# Patient Record
Sex: FEMALE | Race: BLACK OR AFRICAN AMERICAN | Employment: OTHER | URBAN - METROPOLITAN AREA
[De-identification: names, ages, dates, MRNs, and addresses within clinical notes are randomized per-mention and may not be internally consistent; named-entity substitution may affect disease eponyms.]

---

## 2017-02-13 ENCOUNTER — OFFICE VISIT (OUTPATIENT)
Dept: ORTHOPEDIC SURGERY | Age: 67
End: 2017-02-13

## 2017-02-13 VITALS
WEIGHT: 221 LBS | RESPIRATION RATE: 16 BRPM | HEART RATE: 61 BPM | DIASTOLIC BLOOD PRESSURE: 68 MMHG | BODY MASS INDEX: 31.64 KG/M2 | SYSTOLIC BLOOD PRESSURE: 122 MMHG | HEIGHT: 70 IN

## 2017-02-13 DIAGNOSIS — M77.11 LATERAL EPICONDYLITIS OF RIGHT ELBOW: Primary | ICD-10-CM

## 2017-02-13 DIAGNOSIS — M25.521 ELBOW PAIN, RIGHT: ICD-10-CM

## 2017-02-13 PROCEDURE — 99213 OFFICE O/P EST LOW 20 MIN: CPT | Performed by: NURSE PRACTITIONER

## 2017-02-13 PROCEDURE — 73080 X-RAY EXAM OF ELBOW: CPT | Performed by: NURSE PRACTITIONER

## 2017-02-13 PROCEDURE — MISCD86 TENNIS ELBOW STRAP-BREG: Performed by: NURSE PRACTITIONER

## 2017-02-13 RX ORDER — MECLIZINE HYDROCHLORIDE 25 MG/1
25 TABLET ORAL DAILY
COMMUNITY
Start: 2017-02-10 | End: 2018-07-27 | Stop reason: ALTCHOICE

## 2017-02-23 ENCOUNTER — TELEPHONE (OUTPATIENT)
Dept: ORTHOPEDIC SURGERY | Age: 67
End: 2017-02-23

## 2017-02-27 RX ORDER — MELOXICAM 7.5 MG/1
7.5 TABLET ORAL DAILY
Qty: 30 TABLET | Refills: 1 | Status: ON HOLD | OUTPATIENT
Start: 2017-02-27 | End: 2017-07-18 | Stop reason: HOSPADM

## 2017-05-11 ENCOUNTER — TELEPHONE (OUTPATIENT)
Dept: ORTHOPEDIC SURGERY | Age: 67
End: 2017-05-11

## 2017-05-18 ENCOUNTER — OFFICE VISIT (OUTPATIENT)
Dept: ORTHOPEDIC SURGERY | Age: 67
End: 2017-05-18

## 2017-05-18 VITALS — TEMPERATURE: 97 F | WEIGHT: 221 LBS | HEIGHT: 70 IN | RESPIRATION RATE: 16 BRPM | BODY MASS INDEX: 31.64 KG/M2

## 2017-05-18 DIAGNOSIS — M16.11 PRIMARY OSTEOARTHRITIS OF RIGHT HIP: ICD-10-CM

## 2017-05-18 DIAGNOSIS — M25.551 PAIN OF RIGHT HIP JOINT: Primary | ICD-10-CM

## 2017-05-18 PROCEDURE — 99214 OFFICE O/P EST MOD 30 MIN: CPT | Performed by: ORTHOPAEDIC SURGERY

## 2017-05-30 ENCOUNTER — TELEPHONE (OUTPATIENT)
Dept: ORTHOPEDIC SURGERY | Age: 67
End: 2017-05-30

## 2017-06-07 ENCOUNTER — TELEPHONE (OUTPATIENT)
Dept: ORTHOPEDIC SURGERY | Age: 67
End: 2017-06-07

## 2017-06-16 ENCOUNTER — TELEPHONE (OUTPATIENT)
Dept: ORTHOPEDIC SURGERY | Age: 67
End: 2017-06-16

## 2017-07-06 ENCOUNTER — TELEPHONE (OUTPATIENT)
Dept: ORTHOPEDIC SURGERY | Age: 67
End: 2017-07-06

## 2017-07-11 ENCOUNTER — HOSPITAL ENCOUNTER (OUTPATIENT)
Dept: PREADMISSION TESTING | Age: 67
Discharge: HOME | End: 2017-07-11
Attending: ORTHOPAEDIC SURGERY | Admitting: ORTHOPAEDIC SURGERY

## 2017-07-11 LAB
ABO/RH: NORMAL
ALBUMIN SERPL-MCNC: 4.3 G/DL (ref 3.4–5)
ANION GAP SERPL CALCULATED.3IONS-SCNC: 16 MMOL/L (ref 3–16)
ANTIBODY SCREEN: NORMAL
APTT: 26.1 SEC (ref 21–31.8)
BACTERIA: ABNORMAL /HPF
BASOPHILS ABSOLUTE: 0 K/UL (ref 0–0.2)
BASOPHILS RELATIVE PERCENT: 0.5 %
BILIRUBIN URINE: NEGATIVE
BLOOD, URINE: NEGATIVE
BUN BLDV-MCNC: 18 MG/DL (ref 7–20)
CALCIUM SERPL-MCNC: 9.6 MG/DL (ref 8.3–10.6)
CHLORIDE BLD-SCNC: 98 MMOL/L (ref 99–110)
CLARITY: CLEAR
CO2: 25 MMOL/L (ref 21–32)
COLOR: YELLOW
COMMENT UA: ABNORMAL
CREAT SERPL-MCNC: 0.9 MG/DL (ref 0.6–1.2)
EOSINOPHILS ABSOLUTE: 0.1 K/UL (ref 0–0.6)
EOSINOPHILS RELATIVE PERCENT: 1.1 %
EPITHELIAL CELLS, UA: ABNORMAL /HPF
ESTIMATED AVERAGE GLUCOSE: 191.5 MG/DL
GFR AFRICAN AMERICAN: >60
GFR NON-AFRICAN AMERICAN: >60
GLUCOSE BLD-MCNC: 64 MG/DL (ref 70–99)
GLUCOSE URINE: NEGATIVE MG/DL
HBA1C MFR BLD: 8.3 %
HCT VFR BLD CALC: 37.8 % (ref 36–48)
HEMOGLOBIN: 12.5 G/DL (ref 12–16)
INR BLD: 0.93 (ref 0.85–1.15)
KETONES, URINE: NEGATIVE MG/DL
LEUKOCYTE ESTERASE, URINE: ABNORMAL
LYMPHOCYTES ABSOLUTE: 2.1 K/UL (ref 1–5.1)
LYMPHOCYTES RELATIVE PERCENT: 27.5 %
MCH RBC QN AUTO: 29.3 PG (ref 26–34)
MCHC RBC AUTO-ENTMCNC: 33 G/DL (ref 31–36)
MCV RBC AUTO: 88.9 FL (ref 80–100)
MICROSCOPIC EXAMINATION: YES
MONOCYTES ABSOLUTE: 0.6 K/UL (ref 0–1.3)
MONOCYTES RELATIVE PERCENT: 7.2 %
NEUTROPHILS ABSOLUTE: 4.9 K/UL (ref 1.7–7.7)
NEUTROPHILS RELATIVE PERCENT: 63.7 %
NITRITE, URINE: NEGATIVE
PDW BLD-RTO: 13.1 % (ref 12.4–15.4)
PH UA: 6
PLATELET # BLD: 257 K/UL (ref 135–450)
PMV BLD AUTO: 7.9 FL (ref 5–10.5)
POTASSIUM SERPL-SCNC: 3.8 MMOL/L (ref 3.5–5.1)
PREALBUMIN: 24.3 MG/DL (ref 20–40)
PROTEIN UA: NEGATIVE MG/DL
PROTHROMBIN TIME: 10.5 SEC (ref 9.6–13)
RBC # BLD: 4.25 M/UL (ref 4–5.2)
RBC UA: ABNORMAL /HPF (ref 0–2)
SEDIMENTATION RATE, ERYTHROCYTE: 44 MM/HR (ref 0–30)
SODIUM BLD-SCNC: 139 MMOL/L (ref 136–145)
SPECIFIC GRAVITY UA: <1.005
URINE REFLEX TO CULTURE: YES
URINE TYPE: ABNORMAL
UROBILINOGEN, URINE: 0.2 E.U./DL
VITAMIN D 25-HYDROXY: 35.9 NG/ML
WBC # BLD: 7.7 K/UL (ref 4–11)
WBC UA: ABNORMAL /HPF (ref 0–5)

## 2017-07-12 ENCOUNTER — TELEPHONE (OUTPATIENT)
Dept: ORTHOPEDIC SURGERY | Age: 67
End: 2017-07-12

## 2017-07-12 LAB
EKG ATRIAL RATE: 64 BPM
EKG DIAGNOSIS: NORMAL
EKG P AXIS: 61 DEGREES
EKG P-R INTERVAL: 168 MS
EKG Q-T INTERVAL: 402 MS
EKG QRS DURATION: 84 MS
EKG QTC CALCULATION (BAZETT): 414 MS
EKG R AXIS: 39 DEGREES
EKG T AXIS: 42 DEGREES
EKG VENTRICULAR RATE: 64 BPM
MRSA SCREEN RT-PCR: NORMAL

## 2017-07-12 PROCEDURE — 93010 ELECTROCARDIOGRAM REPORT: CPT | Performed by: INTERNAL MEDICINE

## 2017-07-13 ENCOUNTER — OFFICE VISIT (OUTPATIENT)
Dept: ORTHOPEDIC SURGERY | Age: 67
End: 2017-07-13

## 2017-07-13 DIAGNOSIS — M16.11 PRIMARY OSTEOARTHRITIS OF RIGHT HIP: Primary | ICD-10-CM

## 2017-07-13 LAB
ORGANISM: ABNORMAL
URINE CULTURE, ROUTINE: ABNORMAL

## 2017-07-13 PROCEDURE — 99214 OFFICE O/P EST MOD 30 MIN: CPT | Performed by: ORTHOPAEDIC SURGERY

## 2017-07-13 RX ORDER — SULFAMETHOXAZOLE AND TRIMETHOPRIM 800; 160 MG/1; MG/1
1 TABLET ORAL 2 TIMES DAILY
Qty: 20 TABLET | Refills: 0 | Status: SHIPPED | OUTPATIENT
Start: 2017-07-13 | End: 2017-07-23

## 2017-07-14 ENCOUNTER — PAT TELEPHONE (OUTPATIENT)
Dept: PREADMISSION TESTING | Age: 67
End: 2017-07-14

## 2017-07-14 VITALS — WEIGHT: 224 LBS | HEIGHT: 70 IN | BODY MASS INDEX: 32.07 KG/M2

## 2017-07-14 RX ORDER — OXYCODONE HYDROCHLORIDE 5 MG/1
10 TABLET ORAL ONCE
Status: CANCELLED | OUTPATIENT
Start: 2017-07-18

## 2017-07-14 RX ORDER — CELECOXIB 200 MG/1
200 CAPSULE ORAL ONCE
Status: CANCELLED | OUTPATIENT
Start: 2017-07-18

## 2017-07-14 RX ORDER — ANASTROZOLE 1 MG/1
1 TABLET ORAL DAILY
COMMUNITY

## 2017-07-14 RX ORDER — SERTRALINE HYDROCHLORIDE 25 MG/1
25 TABLET, FILM COATED ORAL DAILY
COMMUNITY
Start: 2017-05-10 | End: 2018-07-27 | Stop reason: ALTCHOICE

## 2017-07-14 RX ORDER — MULTIVIT WITH MINERALS/LUTEIN
1000 TABLET ORAL DAILY
COMMUNITY

## 2017-07-17 ENCOUNTER — SURG/PROC ORDERS (OUTPATIENT)
Dept: ANESTHESIOLOGY | Age: 67
End: 2017-07-17

## 2017-07-17 ENCOUNTER — HOSPITAL ENCOUNTER (OUTPATIENT)
Dept: NON INVASIVE DIAGNOSTICS | Age: 67
Discharge: HOME | End: 2017-07-17
Attending: INTERNAL MEDICINE | Admitting: INTERNAL MEDICINE

## 2017-07-17 VITALS — WEIGHT: 224 LBS | HEIGHT: 70 IN | BODY MASS INDEX: 32.07 KG/M2

## 2017-07-17 DIAGNOSIS — R94.31 ABNORMAL ELECTROCARDIOGRAM: ICD-10-CM

## 2017-07-17 RX ORDER — ATROPINE SULFATE 0.1 MG/ML
1 INJECTION INTRAVENOUS ONCE
Status: DISCONTINUED | OUTPATIENT
Start: 2017-07-17 | End: 2017-07-17 | Stop reason: ALTCHOICE

## 2017-07-17 RX ORDER — SODIUM CHLORIDE 0.9 % (FLUSH) 0.9 %
10 SYRINGE (ML) INJECTION EVERY 12 HOURS SCHEDULED
Status: CANCELLED | OUTPATIENT
Start: 2017-07-17

## 2017-07-17 RX ORDER — SODIUM CHLORIDE 0.9 % (FLUSH) 0.9 %
10 SYRINGE (ML) INJECTION PRN
Status: CANCELLED | OUTPATIENT
Start: 2017-07-17

## 2017-07-17 RX ORDER — DOBUTAMINE HYDROCHLORIDE 200 MG/100ML
10 INJECTION INTRAVENOUS CONTINUOUS
Status: ACTIVE | OUTPATIENT
Start: 2017-07-17 | End: 2017-07-17

## 2017-07-17 RX ORDER — SODIUM CHLORIDE 9 MG/ML
INJECTION, SOLUTION INTRAVENOUS CONTINUOUS
Status: CANCELLED | OUTPATIENT
Start: 2017-07-17

## 2017-07-17 RX ADMIN — DOBUTAMINE HYDROCHLORIDE 10 MCG/KG/MIN: 200 INJECTION INTRAVENOUS at 14:42

## 2017-07-18 PROBLEM — M16.11 ARTHRITIS OF RIGHT HIP: Status: ACTIVE | Noted: 2017-07-18

## 2017-07-24 ENCOUNTER — TELEPHONE (OUTPATIENT)
Dept: ORTHOPEDIC SURGERY | Age: 67
End: 2017-07-24

## 2017-07-26 ENCOUNTER — TELEPHONE (OUTPATIENT)
Dept: ORTHOPEDIC SURGERY | Age: 67
End: 2017-07-26

## 2017-07-28 ENCOUNTER — TELEPHONE (OUTPATIENT)
Dept: ORTHOPEDIC SURGERY | Age: 67
End: 2017-07-28

## 2017-07-28 DIAGNOSIS — Z96.641 H/O TOTAL HIP ARTHROPLASTY, RIGHT: Primary | ICD-10-CM

## 2017-07-28 PROBLEM — D64.9 ANEMIA: Status: ACTIVE | Noted: 2017-07-28

## 2017-07-28 PROBLEM — I10 HTN (HYPERTENSION): Status: ACTIVE | Noted: 2017-07-28

## 2017-07-31 ENCOUNTER — TELEPHONE (OUTPATIENT)
Dept: ORTHOPEDIC SURGERY | Age: 67
End: 2017-07-31

## 2017-08-03 ENCOUNTER — OFFICE VISIT (OUTPATIENT)
Dept: ORTHOPEDIC SURGERY | Age: 67
End: 2017-08-03

## 2017-08-03 VITALS — HEIGHT: 70 IN | WEIGHT: 227 LBS | BODY MASS INDEX: 32.5 KG/M2 | RESPIRATION RATE: 16 BRPM | TEMPERATURE: 97.2 F

## 2017-08-03 DIAGNOSIS — M16.11 ARTHRITIS OF RIGHT HIP: Primary | ICD-10-CM

## 2017-08-03 PROCEDURE — 99024 POSTOP FOLLOW-UP VISIT: CPT | Performed by: ORTHOPAEDIC SURGERY

## 2017-08-10 ENCOUNTER — TELEPHONE (OUTPATIENT)
Dept: ORTHOPEDIC SURGERY | Age: 67
End: 2017-08-10

## 2017-08-31 ENCOUNTER — OFFICE VISIT (OUTPATIENT)
Dept: ORTHOPEDIC SURGERY | Age: 67
End: 2017-08-31

## 2017-08-31 VITALS — WEIGHT: 227 LBS | BODY MASS INDEX: 32.5 KG/M2 | RESPIRATION RATE: 16 BRPM | TEMPERATURE: 97.3 F | HEIGHT: 70 IN

## 2017-08-31 DIAGNOSIS — Z96.641 H/O TOTAL HIP ARTHROPLASTY, RIGHT: Primary | ICD-10-CM

## 2017-08-31 PROCEDURE — 99024 POSTOP FOLLOW-UP VISIT: CPT | Performed by: ORTHOPAEDIC SURGERY

## 2017-09-11 ENCOUNTER — OFFICE VISIT (OUTPATIENT)
Dept: CARDIOLOGY CLINIC | Age: 67
End: 2017-09-11

## 2017-09-11 VITALS
DIASTOLIC BLOOD PRESSURE: 72 MMHG | SYSTOLIC BLOOD PRESSURE: 112 MMHG | OXYGEN SATURATION: 97 % | HEIGHT: 70 IN | HEART RATE: 77 BPM | BODY MASS INDEX: 32.07 KG/M2 | WEIGHT: 224 LBS

## 2017-09-11 DIAGNOSIS — I25.10 CORONARY ARTERY DISEASE INVOLVING NATIVE CORONARY ARTERY OF NATIVE HEART WITHOUT ANGINA PECTORIS: Primary | Chronic | ICD-10-CM

## 2017-09-11 PROCEDURE — 99214 OFFICE O/P EST MOD 30 MIN: CPT | Performed by: INTERNAL MEDICINE

## 2017-09-11 PROCEDURE — 93000 ELECTROCARDIOGRAM COMPLETE: CPT | Performed by: INTERNAL MEDICINE

## 2017-10-26 ENCOUNTER — OFFICE VISIT (OUTPATIENT)
Dept: ORTHOPEDIC SURGERY | Age: 67
End: 2017-10-26

## 2017-10-26 VITALS — BODY MASS INDEX: 32.07 KG/M2 | TEMPERATURE: 97.2 F | WEIGHT: 224 LBS | HEIGHT: 70 IN

## 2017-10-26 DIAGNOSIS — Z96.641 H/O TOTAL HIP ARTHROPLASTY, RIGHT: Primary | ICD-10-CM

## 2017-10-26 PROCEDURE — 1036F TOBACCO NON-USER: CPT | Performed by: PHYSICIAN ASSISTANT

## 2017-10-26 PROCEDURE — G8417 CALC BMI ABV UP PARAM F/U: HCPCS | Performed by: PHYSICIAN ASSISTANT

## 2017-10-26 PROCEDURE — G8484 FLU IMMUNIZE NO ADMIN: HCPCS | Performed by: PHYSICIAN ASSISTANT

## 2017-10-26 PROCEDURE — G8598 ASA/ANTIPLAT THER USED: HCPCS | Performed by: PHYSICIAN ASSISTANT

## 2017-10-26 PROCEDURE — 1090F PRES/ABSN URINE INCON ASSESS: CPT | Performed by: PHYSICIAN ASSISTANT

## 2017-10-26 PROCEDURE — 3017F COLORECTAL CA SCREEN DOC REV: CPT | Performed by: PHYSICIAN ASSISTANT

## 2017-10-26 PROCEDURE — 1123F ACP DISCUSS/DSCN MKR DOCD: CPT | Performed by: PHYSICIAN ASSISTANT

## 2017-10-26 PROCEDURE — 3014F SCREEN MAMMO DOC REV: CPT | Performed by: PHYSICIAN ASSISTANT

## 2017-10-26 PROCEDURE — G8400 PT W/DXA NO RESULTS DOC: HCPCS | Performed by: PHYSICIAN ASSISTANT

## 2017-10-26 PROCEDURE — 4040F PNEUMOC VAC/ADMIN/RCVD: CPT | Performed by: PHYSICIAN ASSISTANT

## 2017-10-26 PROCEDURE — 99212 OFFICE O/P EST SF 10 MIN: CPT | Performed by: PHYSICIAN ASSISTANT

## 2017-10-26 PROCEDURE — G8427 DOCREV CUR MEDS BY ELIG CLIN: HCPCS | Performed by: PHYSICIAN ASSISTANT

## 2017-10-27 PROBLEM — Z96.641 H/O TOTAL HIP ARTHROPLASTY, RIGHT: Status: ACTIVE | Noted: 2017-10-27

## 2017-11-08 ENCOUNTER — TELEPHONE (OUTPATIENT)
Dept: ORTHOPEDIC SURGERY | Age: 67
End: 2017-11-08

## 2018-01-26 ENCOUNTER — TELEPHONE (OUTPATIENT)
Dept: ORTHOPEDIC SURGERY | Age: 68
End: 2018-01-26

## 2018-02-05 ENCOUNTER — OFFICE VISIT (OUTPATIENT)
Dept: ORTHOPEDIC SURGERY | Age: 68
End: 2018-02-05

## 2018-02-05 ENCOUNTER — TELEPHONE (OUTPATIENT)
Dept: ORTHOPEDIC SURGERY | Age: 68
End: 2018-02-05

## 2018-02-05 VITALS
HEART RATE: 74 BPM | SYSTOLIC BLOOD PRESSURE: 114 MMHG | BODY MASS INDEX: 33.21 KG/M2 | DIASTOLIC BLOOD PRESSURE: 59 MMHG | TEMPERATURE: 98.1 F | HEIGHT: 70 IN | WEIGHT: 232 LBS

## 2018-02-05 DIAGNOSIS — M17.12 ARTHRITIS OF LEFT KNEE: ICD-10-CM

## 2018-02-05 DIAGNOSIS — Z96.641 H/O TOTAL HIP ARTHROPLASTY, RIGHT: ICD-10-CM

## 2018-02-05 DIAGNOSIS — M17.11 PRIMARY OSTEOARTHRITIS OF RIGHT KNEE: Primary | ICD-10-CM

## 2018-02-16 NOTE — PROGRESS NOTES
Patient is a 57-year-old female presents today complaining of increased knee pain right much worse than left. She also is complaining of new loss of range of motion and swelling of the knee joint. Patient is status post right total hip arthroplasty performed 7/18/2017. Last injection that she received in her right knee was on 8/11/2016. The patient has no history of injury or surgery to the right knee. Patient does have diabetes and currently is not taking any oral narcotics. There is no history of tobacco use but the patient does have sarcoidosis. She's here for evaluation and treatment of increased right knee pain. Patient Active Problem List   Diagnosis    Sarcoidosis    Diabetes mellitus ([facility] [address]    Degenerative arthritis of knee    Seizure disorder ([facility] [address]    Diverticulosis    Headaches, cluster    Chest pain    Trigger finger    CAD (coronary artery disease)    Diastolic dysfunction, left ventricle    TIFFANY (obstructive sleep apnea)    Restrictive lung disease    Sinusitis    Ankle edema    Edema    Arthritis of right hip    HTN (hypertension)    Anemia    H/O total hip arthroplasty, right-7.18.2017     Prior to Visit Medications    Medication Sig Taking?  Authorizing Provider   naproxen (NAPROSYN) 375 MG tablet Take 1 tablet by mouth 2 times daily for 5 days  Spencer Brunner, PA-C   rivaroxaban (XARELTO) 10 MG TABS tablet Take 1 tablet by mouth daily  DARSHANA Schulte   insulin lispro (HUMALOG) 100 UNIT/ML injection vial Inject 18 Units into the skin 3 times daily (before meals) Will adjust based on BS  Historical Provider, MD   Ascorbic Acid (VITAMIN C) 1000 MG tablet Take by mouth  Historical Provider, MD   sertraline (ZOLOFT) 25 MG tablet Take 25 mg by mouth daily   Historical Provider, MD   anastrozole (ARIMIDEX) 1 MG tablet Take 1 mg by mouth daily  Historical Provider, MD   meclizine (ANTIVERT) 25 MG tablet Take 25 mg by mouth daily   Historical Provider, MD   metFORMIN (GLUCOPHAGE) 500 MG tablet Take 1,000 mg by mouth 2 times daily (with meals) Pt takes 2-4 tabs daily depending on BS  Historical Provider, MD   furosemide (LASIX) 40 MG tablet TAKE 1 TABLET BY MOUTH ONCE DAILY AS DIRECTED  Patient taking differently: daily   Gerardo Dobbs MD   lubiprostone (AMITIZA) 8 MCG CAPS capsule TAKE ONE (1) CAPSULE BY MOUTH TWO (2) TIMES DAILY WITH MEALS  Patient taking differently: Take 2 tabs in am and will repeat in evening if needed for IBS  Fabricio Watson MD   EASY TOUCH INSULIN SYRINGE 30G X 5/16\" 1 ML MISC   Historical Provider, MD   Blood Glucose Calibration (PRODIGY CONTROL SOLUTION) LOW SOLN   Historical Provider, MD   Blood Glucose Monitoring Suppl (PRODIGY AUTOCODE BLOOD GLUCOSE) W/DEVICE KIT   Historical Provider, MD   Blood Glucose Monitoring Suppl ROSEANNE 1 each by Does not apply route three times daily Ok to dispense whichever brand is covered by insurance  Fabricio Watson MD   Glucose Blood (BLOOD GLUCOSE TEST STRIPS) STRP Test three times daily. Ok to dispense whichever brand is covered by insurance. Fabricio Watson MD   Lancets MISC 1 each by Does not apply route 3 times daily Ok to dispense whichever brand is covered by insurance  Fabricio Watson MD   Blood Glucose Calibration (PRODIGY CONTROL SOLUTION) HIGH SOLN 1 each by In Vitro route three times daily Test before using machine. Ok to dispense which ever brand is covered by insurance. Fabricio Watson MD   Insulin Pen Needle (PEN NEEDLES 31GX5/16\") 31G X 8 MM MISC 1 each by Does not apply route daily. Gerardo Dobbs MD   insulin glargine (LANTUS) 100 UNIT/ML injection vial Inject 50 Units into the skin daily. Patient taking differently: Inject 52 Units into the skin daily Takes in AM  Gerardo Dobbs MD   rosuvastatin (CRESTOR) 10 MG tablet Take 1 tablet by mouth daily.   Gerardo Dobbs MD   albuterol (PROVENTIL) (2.5 MG/3ML) 0.083% nebulizer solution Take 2.5 mg by nebulization every 6 hours as needed for Shortness of Breath. Historical Provider, MD     Physical examination 64-year-old female oriented ×3 temperature is 98.1. Examination of her back shows good range of motion mild tenderness to palpation but no specific signs of instability or deep sepsis. Motor exam to the lower extremity shows quadriceps hamstrings hip abductors and abductors foot plantar dorsiflexors are intact 4-5 over 5 to the right lower extremity. Sensation and perfusion are intact to the right foot and ankle. Examination of the right knee shows an obvious effusion full extension to only 120° of flexion before she experiences pain. There is no signs of instability or deep sepsis noted in the right knee. Examination of the right hip shows a healed anterior wound. No signs of instability or deep sepsis noted in the right hip joint. X-rays obtained first AP pelvis and AP lateral of the right hip show status post uncemented right total hip arthroplasty. Stable overall orientation and alignment with good ingrowth noted of both femoral and acetabular components. Heterotopic ossification is noted anteriorly and anterior laterally. No signs of instability or deep sepsis are noted in the right hip joint. No other obvious fractures tumors or dislocations are noted on these x-rays. X-rays obtained AP lateral and patellofemoral view of the right knee shows advanced end-stage lateral bone-on-bone degenerative osteoarthritis. Complete loss of the lateral tibiofemoral space space is noted. Mild to moderate patellofemoral degenerative arthritis is also noted in both right and left knees with lateral subluxation noted in both knees. X-rays obtained AP lateral patellofemoral view of the left knee shows early degenerative osteoarthritis with medial narrowing an overall varus deformity of the knee joint.   Again patellofemoral degenerative joint disease is seen but no other obvious fractures tumors or dislocations are noted

## 2018-04-04 RX ORDER — CEPHALEXIN 500 MG/1
CAPSULE ORAL
Qty: 12 CAPSULE | Refills: 0 | Status: SHIPPED | OUTPATIENT
Start: 2018-04-04 | End: 2018-07-10 | Stop reason: SDUPTHER

## 2018-05-07 ENCOUNTER — OFFICE VISIT (OUTPATIENT)
Dept: ORTHOPEDIC SURGERY | Age: 68
End: 2018-05-07

## 2018-05-07 VITALS
HEART RATE: 74 BPM | BODY MASS INDEX: 33.21 KG/M2 | DIASTOLIC BLOOD PRESSURE: 60 MMHG | WEIGHT: 232 LBS | RESPIRATION RATE: 16 BRPM | SYSTOLIC BLOOD PRESSURE: 110 MMHG | HEIGHT: 70 IN | TEMPERATURE: 97.5 F

## 2018-05-07 DIAGNOSIS — Z96.641 H/O TOTAL HIP ARTHROPLASTY, RIGHT: ICD-10-CM

## 2018-05-07 DIAGNOSIS — M17.12 ARTHRITIS OF LEFT KNEE: Primary | ICD-10-CM

## 2018-05-07 PROCEDURE — 1090F PRES/ABSN URINE INCON ASSESS: CPT | Performed by: PHYSICIAN ASSISTANT

## 2018-05-07 PROCEDURE — G8417 CALC BMI ABV UP PARAM F/U: HCPCS | Performed by: PHYSICIAN ASSISTANT

## 2018-05-07 PROCEDURE — 20610 DRAIN/INJ JOINT/BURSA W/O US: CPT | Performed by: PHYSICIAN ASSISTANT

## 2018-05-07 PROCEDURE — 1036F TOBACCO NON-USER: CPT | Performed by: PHYSICIAN ASSISTANT

## 2018-05-07 PROCEDURE — 3017F COLORECTAL CA SCREEN DOC REV: CPT | Performed by: PHYSICIAN ASSISTANT

## 2018-05-07 PROCEDURE — G8599 NO ASA/ANTIPLAT THER USE RNG: HCPCS | Performed by: PHYSICIAN ASSISTANT

## 2018-05-07 PROCEDURE — 1123F ACP DISCUSS/DSCN MKR DOCD: CPT | Performed by: PHYSICIAN ASSISTANT

## 2018-05-07 PROCEDURE — 4040F PNEUMOC VAC/ADMIN/RCVD: CPT | Performed by: PHYSICIAN ASSISTANT

## 2018-05-07 PROCEDURE — 99212 OFFICE O/P EST SF 10 MIN: CPT | Performed by: PHYSICIAN ASSISTANT

## 2018-05-07 PROCEDURE — G8427 DOCREV CUR MEDS BY ELIG CLIN: HCPCS | Performed by: PHYSICIAN ASSISTANT

## 2018-05-07 PROCEDURE — G8400 PT W/DXA NO RESULTS DOC: HCPCS | Performed by: PHYSICIAN ASSISTANT

## 2018-05-10 PROBLEM — M17.12 ARTHRITIS OF LEFT KNEE: Status: ACTIVE | Noted: 2018-05-10

## 2018-05-14 ENCOUNTER — OFFICE VISIT (OUTPATIENT)
Dept: ORTHOPEDIC SURGERY | Age: 68
End: 2018-05-14

## 2018-05-14 ENCOUNTER — TELEPHONE (OUTPATIENT)
Dept: ORTHOPEDIC SURGERY | Age: 68
End: 2018-05-14

## 2018-05-14 VITALS
HEIGHT: 70 IN | SYSTOLIC BLOOD PRESSURE: 112 MMHG | DIASTOLIC BLOOD PRESSURE: 57 MMHG | BODY MASS INDEX: 33.21 KG/M2 | WEIGHT: 232 LBS | HEART RATE: 64 BPM | RESPIRATION RATE: 16 BRPM

## 2018-05-14 DIAGNOSIS — R20.0 HAND NUMBNESS: Primary | ICD-10-CM

## 2018-05-14 PROCEDURE — G8417 CALC BMI ABV UP PARAM F/U: HCPCS | Performed by: PHYSICIAN ASSISTANT

## 2018-05-14 PROCEDURE — G8400 PT W/DXA NO RESULTS DOC: HCPCS | Performed by: PHYSICIAN ASSISTANT

## 2018-05-14 PROCEDURE — G8599 NO ASA/ANTIPLAT THER USE RNG: HCPCS | Performed by: PHYSICIAN ASSISTANT

## 2018-05-14 PROCEDURE — 1036F TOBACCO NON-USER: CPT | Performed by: PHYSICIAN ASSISTANT

## 2018-05-14 PROCEDURE — 3017F COLORECTAL CA SCREEN DOC REV: CPT | Performed by: PHYSICIAN ASSISTANT

## 2018-05-14 PROCEDURE — 1090F PRES/ABSN URINE INCON ASSESS: CPT | Performed by: PHYSICIAN ASSISTANT

## 2018-05-14 PROCEDURE — 1123F ACP DISCUSS/DSCN MKR DOCD: CPT | Performed by: PHYSICIAN ASSISTANT

## 2018-05-14 PROCEDURE — 4040F PNEUMOC VAC/ADMIN/RCVD: CPT | Performed by: PHYSICIAN ASSISTANT

## 2018-05-14 PROCEDURE — L3908 WHO COCK-UP NONMOLDE PRE OTS: HCPCS | Performed by: PHYSICIAN ASSISTANT

## 2018-05-14 PROCEDURE — G8427 DOCREV CUR MEDS BY ELIG CLIN: HCPCS | Performed by: PHYSICIAN ASSISTANT

## 2018-05-14 PROCEDURE — 99214 OFFICE O/P EST MOD 30 MIN: CPT | Performed by: PHYSICIAN ASSISTANT

## 2018-06-15 ENCOUNTER — HOSPITAL ENCOUNTER (OUTPATIENT)
Dept: OTHER | Age: 68
Discharge: HOME | End: 2018-06-30
Attending: INTERNAL MEDICINE | Admitting: INTERNAL MEDICINE

## 2018-07-01 ENCOUNTER — HOSPITAL ENCOUNTER (OUTPATIENT)
Dept: OTHER | Age: 68
Discharge: HOME | End: 2018-07-31
Attending: INTERNAL MEDICINE | Admitting: INTERNAL MEDICINE

## 2018-07-09 ENCOUNTER — OFFICE VISIT (OUTPATIENT)
Dept: ORTHOPEDIC SURGERY | Age: 68
End: 2018-07-09

## 2018-07-09 VITALS
SYSTOLIC BLOOD PRESSURE: 133 MMHG | HEART RATE: 67 BPM | WEIGHT: 218.4 LBS | BODY MASS INDEX: 31.26 KG/M2 | HEIGHT: 70 IN | RESPIRATION RATE: 16 BRPM | TEMPERATURE: 97.7 F | DIASTOLIC BLOOD PRESSURE: 72 MMHG

## 2018-07-09 DIAGNOSIS — M17.11 ARTHRITIS OF RIGHT KNEE: ICD-10-CM

## 2018-07-09 DIAGNOSIS — Z96.641 H/O TOTAL HIP ARTHROPLASTY, RIGHT: Primary | ICD-10-CM

## 2018-07-09 DIAGNOSIS — M17.12 ARTHRITIS OF LEFT KNEE: ICD-10-CM

## 2018-07-09 PROCEDURE — G8400 PT W/DXA NO RESULTS DOC: HCPCS | Performed by: PHYSICIAN ASSISTANT

## 2018-07-09 PROCEDURE — 20610 DRAIN/INJ JOINT/BURSA W/O US: CPT | Performed by: PHYSICIAN ASSISTANT

## 2018-07-09 PROCEDURE — 99212 OFFICE O/P EST SF 10 MIN: CPT | Performed by: PHYSICIAN ASSISTANT

## 2018-07-09 PROCEDURE — G8427 DOCREV CUR MEDS BY ELIG CLIN: HCPCS | Performed by: PHYSICIAN ASSISTANT

## 2018-07-09 PROCEDURE — 4040F PNEUMOC VAC/ADMIN/RCVD: CPT | Performed by: PHYSICIAN ASSISTANT

## 2018-07-09 PROCEDURE — G8417 CALC BMI ABV UP PARAM F/U: HCPCS | Performed by: PHYSICIAN ASSISTANT

## 2018-07-09 PROCEDURE — 1123F ACP DISCUSS/DSCN MKR DOCD: CPT | Performed by: PHYSICIAN ASSISTANT

## 2018-07-09 PROCEDURE — 1090F PRES/ABSN URINE INCON ASSESS: CPT | Performed by: PHYSICIAN ASSISTANT

## 2018-07-09 PROCEDURE — 3017F COLORECTAL CA SCREEN DOC REV: CPT | Performed by: PHYSICIAN ASSISTANT

## 2018-07-09 PROCEDURE — 1101F PT FALLS ASSESS-DOCD LE1/YR: CPT | Performed by: PHYSICIAN ASSISTANT

## 2018-07-09 PROCEDURE — 1036F TOBACCO NON-USER: CPT | Performed by: PHYSICIAN ASSISTANT

## 2018-07-09 PROCEDURE — G8599 NO ASA/ANTIPLAT THER USE RNG: HCPCS | Performed by: PHYSICIAN ASSISTANT

## 2018-07-11 PROBLEM — M17.11 ARTHRITIS OF RIGHT KNEE: Status: ACTIVE | Noted: 2018-07-11

## 2018-07-11 RX ORDER — CEPHALEXIN 500 MG/1
CAPSULE ORAL
Qty: 12 CAPSULE | Refills: 0 | Status: SHIPPED | OUTPATIENT
Start: 2018-07-11 | End: 2019-01-01

## 2018-07-11 NOTE — PROGRESS NOTES
abdomen took 1 1/2 foot large intestine    TONSILLECTOMY AND ADENOIDECTOMY      UMBILICAL HERNIA REPAIR      WRIST GANGLION EXCISION      right wrist       Social History     Occupational History    retired       retired    disability      Social History Main Topics    Smoking status: Former Smoker     Packs/day: 0.70     Years: 2.00     Types: Cigarettes     Quit date: 5/18/1971    Smokeless tobacco: Never Used    Alcohol use Yes      Comment: rare    Drug use: No    Sexual activity: Yes     Partners: Male       Current Outpatient Prescriptions   Medication Sig Dispense Refill    cephALEXin (KEFLEX) 500 MG capsule Take 2 capsules by mouth one hour before and 2 capsules one hour after dental procedures.  This is for 3 visits 12 capsule 0    naproxen (NAPROSYN) 375 MG tablet Take 1 tablet by mouth 2 times daily for 5 days 10 tablet 0    rivaroxaban (XARELTO) 10 MG TABS tablet Take 1 tablet by mouth daily 2 tablet 0    insulin lispro (HUMALOG) 100 UNIT/ML injection vial Inject 18 Units into the skin 3 times daily (before meals) Will adjust based on BS      Ascorbic Acid (VITAMIN C) 1000 MG tablet Take by mouth      sertraline (ZOLOFT) 25 MG tablet Take 25 mg by mouth daily       anastrozole (ARIMIDEX) 1 MG tablet Take 1 mg by mouth daily      meclizine (ANTIVERT) 25 MG tablet Take 25 mg by mouth daily       metFORMIN (GLUCOPHAGE) 500 MG tablet Take 1,000 mg by mouth 2 times daily (with meals) Pt takes 2-4 tabs daily depending on BS      furosemide (LASIX) 40 MG tablet TAKE 1 TABLET BY MOUTH ONCE DAILY AS DIRECTED (Patient taking differently: daily ) 60 tablet 3    lubiprostone (AMITIZA) 8 MCG CAPS capsule TAKE ONE (1) CAPSULE BY MOUTH TWO (2) TIMES DAILY WITH MEALS (Patient taking differently: Take 2 tabs in am and will repeat in evening if needed for IBS) 60 capsule 3    EASY TOUCH INSULIN SYRINGE 30G X 5/16\" 1 ML MISC       Blood Glucose Calibration (PRODIGY CONTROL SOLUTION) LOW SOLN  Blood Glucose Monitoring Suppl (PRODIGY AUTOCODE BLOOD GLUCOSE) W/DEVICE KIT       Blood Glucose Monitoring Suppl ROSEANNE 1 each by Does not apply route three times daily Ok to dispense whichever brand is covered by insurance 1 Device 0    Glucose Blood (BLOOD GLUCOSE TEST STRIPS) STRP Test three times daily. Ok to dispense whichever brand is covered by insurance. 100 strip 3    Lancets MISC 1 each by Does not apply route 3 times daily Ok to dispense whichever brand is covered by insurance 100 each 3    Blood Glucose Calibration (PRODIGY CONTROL SOLUTION) HIGH SOLN 1 each by In Vitro route three times daily Test before using machine. Ok to dispense which ever brand is covered by insurance. 1 each 0    Insulin Pen Needle (PEN NEEDLES 31GX5/16\") 31G X 8 MM MISC 1 each by Does not apply route daily. 100 each 12    insulin glargine (LANTUS) 100 UNIT/ML injection vial Inject 50 Units into the skin daily. (Patient taking differently: Inject 52 Units into the skin daily Takes in AM) 1 vial 10    rosuvastatin (CRESTOR) 10 MG tablet Take 1 tablet by mouth daily. 30 tablet 3    albuterol (PROVENTIL) (2.5 MG/3ML) 0.083% nebulizer solution Take 2.5 mg by nebulization every 6 hours as needed for Shortness of Breath. No current facility-administered medications for this visit. Objective:   She is alert, oriented x 3, pleasant, well nourished, developed and in no acute distress. /72   Pulse 67   Temp 97.7 °F (36.5 °C) (Temporal)   Resp 16   Ht 5' 10\" (1.778 m)   Wt 218 lb 6.4 oz (99.1 kg)   LMP 05/18/1975   BMI 31.34 kg/m²        Examination of the bilateral knee shows: The alignment of the knee is mild varus. There is not erythema. There mild soft tissue swelling. There is mild effusion. ROM-  Extension 0          -   Flexion  125   There moderate pain associated with ROM testing. Medial joint line is tender to palpation. Lateral joint line is somewhat tender to palpation. Retro patellar crepitus is present. There is is not crepitus along the joint line with ROM testing. Varus Stress testing does produce pain,                                     does not show laxity. Valgus Stress testing does produce pain,                                       does not show laxity. Extensor Mechanism is  intact. Examination of the right hip shows: There is not deformity. There is not erythema. There is no pain with internal and external rotation. There is no pain with flexion and extension. There is no pain with active SLR. ROM diminished range of motion. Leg lengths: Equal  Trochanteric region is not tender to palpation. Sacral Iliac is not tender to palpation. There is no pain with weight bearing. X Rays: performed in the office today:   AP Pelvis, AP and Frog Leg Lateral  Right Hip: There is a right prosthetic total hip arthroplasty present. The alignment is satisfactory. There are no signs of failure, dislocation or loosening. Diagnosis:        ICD-10-CM ICD-9-CM    1. H/O total hip arthroplasty, right-7.18.2017 Z96.641 V43.64    2. Arthritis of left knee M17.12 716.96    3. Arthritis of right knee M17.11 716.96         Assessment/ Plan:      The natural history of the patient's diagnosis as well as the treatment options were discussed in full and questions were answered. Risks and benefits of the treatment options also reviewed in detail. Status postop right hip arthroplasty 1 year postop. Doing reasonably well. Continue home exercise program.  History of bilateral knee arthritis. Increased disability. Recommend repeat cortisone injection today. Risk and benefits of corticosteroid intra-articular injection was discussed today. All questions were answered to her satisfaction. She verbally consented to proceed with intra-articular injection today.         Cortisone Injection                                                       PROCEDURE NOTE:     Pre op Diagnosis:  bilateral knee pain     Post op Diagnosis: Same  With the patient's permission, her bilateral knee was prepped  in standard sterile fashion with  Alcohol and 2 cc of 0.25% Marcaine and 1 cc of Kenalog 40 mg was injected into the bilateral lateral compartment  without difficulty. The patient tolerated this well without difficulty. A band-aid was applied. The patient was advised to ice the knee for 15-20 minutes to relieve any injection site related pain. Weight loss, activity modification, home exercise therapy program, NSAID'S, dietary changes have been discussed as a means to help control the symptoms. Follow Up:   Call or return to clinic prn if these symptoms worsen or fail to improve as anticipated.

## 2018-07-19 ENCOUNTER — HOSPITAL ENCOUNTER (OUTPATIENT)
Dept: OTHER | Age: 68
Discharge: HOME | End: 2018-07-31
Attending: ORTHOPAEDIC SURGERY | Admitting: ORTHOPAEDIC SURGERY

## 2018-07-19 NOTE — PLAN OF CARE
The Lower Extremity Functional Scale    Patient: Abdirashid De La Torre  : 1950  MRN: [id number]  Date: 2018  Electronically Signed by: Jorge Singh     We are interested in knowing whether you are having any difficulty at all with the activities listed below because of your lower limb problem for which you are currently seeking attention. Please provide an answer for each activity.          Today would you have difficulty at all with:    Activities Extreme Difficulty or Unable to Perform Activity Quite a Bit of Difficulty Moderate Difficulty A Little Bit of Difficulty No Difficulty   1 Any of your usual work, housework, or school activities [x]0 []1 []2  []3 []4   2 Your usual hobbies, recreational, or sporting activities [x]0 []1 []2 []3 []4   3 Getting into or out of the bath [x]0 []1 []2 []3 []4   4 Walking between rooms []0 []1 [x]2 []3 []4   5 Putting on your shoes or socks [x]0 []1 []2 []3 []4   6 Squatting [x]0 []1 []2 []3 []4   7 Lifting an object, like a bag of groceries from the floor []0 [x]1 []2 []3 []4   8 Performing light activities around your home []0 [x]1 []2 []3 []4   9 Performing heavy activities around your home [x]0 []1 []2 []3 []4   10 Getting into or out of a car []0 [x]1 []2 []3 []4   11 Walking 2 blocks [x]0 []1 []2 []3 []4   12 Walking a mile [x]0 []1 []2 []3 []4   13 Going up or down 10 stairs (about 1 flight of stairs) [x]0 []1 []2 []3 []4   14 Standing for 1 hour [x]0 []1 []2 []3 []4   15 Sitting for 1 hour [x]0 []1 []2 []3 []4   16 Running on even ground  [x]0 []1 []2 []3 []4   17 Running on uneven ground  [x]0 []1 []2 []3 []4   18 Making sharp turns while running fast  [x]0 []1 []2 []3 []4   19 Hopping [x]0 []1 []2 []3 []4   20 Rolling over in bed [x]0 []1 []2 []3 []4    Column Totals:            Patient Score from Above: 5    (Patient Score / 80) X 100:  %                          Scoring Method for Lower Extremity Functional Scale    The Lower Extremity Functional
referral.      Physician Signature:________________________________Date:__________________  By signing above, therapists plan is approved by physician

## 2018-07-19 NOTE — PROGRESS NOTES
flexion L and R, rotation tolerated fair to well L and R   Special Tests: Dural tension positive at RLE for increased LE pain    Strength RLE  R Hip Flexion: 3-/5  R Hip ABduction: 3-/5  R Hip ADduction: 3-/5  R Knee Flexion: 4-/5  R Knee Extension: 4-/5  R Ankle Dorsiflexion: 4-/5  R Ankle Plantar flexion: 3-/5  Strength LLE  Comment: grossly 4/5   Strength Other  Other: abdominal strength lower 3-/5 upper        Sensation  Overall Sensation Status: WFL  Bed mobility  Rolling to Left: Modified independent  Rolling to Right: Modified independent  Supine to Sit: Independent  Sit to Supine: Independent  Scooting: Independent  Transfers  Sit to Stand: Independent  Stand to sit: Independent  Ambulation  Ambulation?: Yes  Ambulation 1  Device: Rolling Walker  Assistance: Independent  Quality of Gait: decreased stance phase , decreased step length , PF and DF, antalgic  Stairs/Curb  Stairs?: No  Balance  Posture: Fair  Sitting - Static: Good  Sitting - Dynamic: Good;-  Standing - Static: Good;-  Standing - Dynamic: Fair;+                         Assessment   Conditions Requiring Skilled Therapeutic Intervention  Body structures, Functions, Activity limitations: Decreased functional mobility ; Decreased ADL status; Decreased ROM; Decreased strength;Decreased balance  Assessment: PLOF pt has a home health aid who cleans , she bathes and dresses herself though it is becomming harder  Treatment Diagnosis: Gait and mobility dysfunction due to RLE and core weakness  Prognosis: Fair  Decision Making: Medium complexity  History: Rehab potential limited by h/o chronic pain and sedintary life style  REQUIRES PT FOLLOW UP: Yes         Plan   Plan  Times per week: continue PT 2-3 x weekly for 4-6 weeks  Current Treatment Recommendations: Strengthening, Balance Training, Gait Training, Home Exercise Program, Modalities, Manual Therapy - Soft Tissue Mobilization, Neuromuscular Re-education    G-Code  PT G-Codes  Functional Assessment Tool

## 2018-07-19 NOTE — FLOWSHEET NOTE
Hamstring Ankle DF Ankle PF   Eval                                      Exercises:  Exercise/Equipment Resistance/Repetitions Other comments   Nustep     Leg Press     incline     HR/TR               Mat ex     Bridges                         NS acitivity     seated Discussed importance and demonstrated NS seated 7/19                            HEP      PPT 10\" x 10 7/19   LAQ 15 x 7/19   Clam shells 15 x 2 7/19               Other Therapeutic Activities:    7/19/18 The LEFS test was discussed with and applied to the patient. The patient both verbalized level of function and understanding of the tests purpose. 7/19/18 Neutral Spine (NS) Instruction. The patient demonstrated good tolerance, technique  and verbalized understanding with and of NS instruction respectively. Home Exercise Program:   7/19/18 The patient demonstrated good tolerance to and understanding of the HEP. Written instructions have been issued.      Manual Treatments:      Modalities:  HP seated to LS x 10 min      Timed Code Treatment Minutes:  25    Total Treatment Minutes:  70    Treatment/Activity Tolerance:  [x] Patient tolerated treatment well [] Patient limited by fatigue  [] Patient limited by pain  [] Patient limited by other medical complications  [] Other:     Prognosis: [x] Good [x] Fair  [] Poor    Patient Requires Follow-up: [x] Yes  [] No    Plan:   [x] Continue per plan of care [] Alter current plan (see comments)  [] Plan of care initiated [] Hold pending MD visit [] Discharge     Plan for Next Session:  Review HEP, add to thera Ex , MFR to RLE    Electronically signed by:  Andres Trejo PT

## 2018-07-24 ENCOUNTER — HOSPITAL ENCOUNTER (OUTPATIENT)
Dept: PHYSICAL THERAPY | Age: 68
Discharge: HOME | End: 2018-07-25
Admitting: ORTHOPAEDIC SURGERY

## 2018-07-24 NOTE — FLOWSHEET NOTE
7/19, 7/24 pt able to describe NS and identify on PT                            HEP      PPT 10\" x 10 7/19, 7/24 good tech   LAQ 15 x 7/19, 7/24 cues for posture   Clam shells 15 x 2 7/19, 7/24 cues to correct tech. Other Therapeutic Activities:    7/19/18 The LEFS test was discussed with and applied to the patient. The patient both verbalized level of function and understanding of the tests purpose. 7/19/18 Neutral Spine (NS) Instruction. The patient demonstrated good tolerance, technique  and verbalized understanding with and of NS instruction respectively. Home Exercise Program:   7/19/18 The patient demonstrated good tolerance to and understanding of the HEP. Written instructions have been issued.      Manual Treatments:      Modalities:  HP seated to LS x 10 min      Timed Code Treatment Minutes:  30    Total Treatment Minutes:  45    Treatment/Activity Tolerance:  [x] Patient tolerated treatment well [] Patient limited by fatigue  [] Patient limited by pain  [] Patient limited by other medical complications  [] Other:     Prognosis: [x] Good [x] Fair  [] Poor    Patient Requires Follow-up: [x] Yes  [] No    Plan:   [x] Continue per plan of care [] Alter current plan (see comments)  [] Plan of care initiated [] Hold pending MD visit [] Discharge     Plan for Next Session:   add to thera Ex , MFR to RLE    Electronically signed by:  Kirstie Cobian, PT

## 2018-07-27 PROBLEM — E78.5 HYPERLIPIDEMIA: Status: ACTIVE | Noted: 2018-07-27

## 2018-07-28 PROBLEM — R20.0 HAND NUMBNESS: Status: RESOLVED | Noted: 2018-05-14 | Resolved: 2018-07-28

## 2018-07-28 PROBLEM — R42 VERTIGO: Status: ACTIVE | Noted: 2018-07-28

## 2018-07-30 ENCOUNTER — OFFICE VISIT (OUTPATIENT)
Dept: CARDIOLOGY CLINIC | Age: 68
End: 2018-07-30

## 2018-07-30 VITALS
HEART RATE: 70 BPM | WEIGHT: 215 LBS | HEIGHT: 70 IN | BODY MASS INDEX: 30.78 KG/M2 | DIASTOLIC BLOOD PRESSURE: 78 MMHG | SYSTOLIC BLOOD PRESSURE: 118 MMHG | OXYGEN SATURATION: 97 %

## 2018-07-30 DIAGNOSIS — I25.10 CORONARY ARTERY DISEASE INVOLVING NATIVE CORONARY ARTERY OF NATIVE HEART WITHOUT ANGINA PECTORIS: Chronic | ICD-10-CM

## 2018-07-30 DIAGNOSIS — R07.89 OTHER CHEST PAIN: ICD-10-CM

## 2018-07-30 DIAGNOSIS — D86.9 SARCOIDOSIS: ICD-10-CM

## 2018-07-30 DIAGNOSIS — R07.2 PRECORDIAL PAIN: Primary | ICD-10-CM

## 2018-07-30 LAB
C-REACTIVE PROTEIN: 3.1 MG/L (ref 0–5.1)
SEDIMENTATION RATE, ERYTHROCYTE: 36 MM/HR (ref 0–30)

## 2018-07-30 PROCEDURE — G8400 PT W/DXA NO RESULTS DOC: HCPCS | Performed by: INTERNAL MEDICINE

## 2018-07-30 PROCEDURE — 99214 OFFICE O/P EST MOD 30 MIN: CPT | Performed by: INTERNAL MEDICINE

## 2018-07-30 PROCEDURE — 1036F TOBACCO NON-USER: CPT | Performed by: INTERNAL MEDICINE

## 2018-07-30 PROCEDURE — 1090F PRES/ABSN URINE INCON ASSESS: CPT | Performed by: INTERNAL MEDICINE

## 2018-07-30 PROCEDURE — 4040F PNEUMOC VAC/ADMIN/RCVD: CPT | Performed by: INTERNAL MEDICINE

## 2018-07-30 PROCEDURE — 1123F ACP DISCUSS/DSCN MKR DOCD: CPT | Performed by: INTERNAL MEDICINE

## 2018-07-30 PROCEDURE — G8598 ASA/ANTIPLAT THER USED: HCPCS | Performed by: INTERNAL MEDICINE

## 2018-07-30 PROCEDURE — 93000 ELECTROCARDIOGRAM COMPLETE: CPT | Performed by: INTERNAL MEDICINE

## 2018-07-30 PROCEDURE — G8417 CALC BMI ABV UP PARAM F/U: HCPCS | Performed by: INTERNAL MEDICINE

## 2018-07-30 PROCEDURE — G8427 DOCREV CUR MEDS BY ELIG CLIN: HCPCS | Performed by: INTERNAL MEDICINE

## 2018-07-30 PROCEDURE — 3017F COLORECTAL CA SCREEN DOC REV: CPT | Performed by: INTERNAL MEDICINE

## 2018-07-30 PROCEDURE — 1101F PT FALLS ASSESS-DOCD LE1/YR: CPT | Performed by: INTERNAL MEDICINE

## 2018-07-30 NOTE — PROGRESS NOTES
Subjective:      Patient ID: Irish Murillo is a 77 y.o. female. [facility]              CARDIOLOGY FOLLOW UP     CC: \"Chest pain\"      Irish Murillo is a 75 y.o. female with a history of DM, HTN sarcoidosis comes in with chest pain. She had LHC which showed non obstructive CAD with LV diastolic dysfunction. She uses oxygen at night secondary to her history of Sarcoid. She has edema with LV diastolic dysfunction. She has mild shortness of breath with exertion. Nayeli Luna returns in over 2 years for evaluation. She reports that since we last saw her she was diagnosed with breast cancer, underwent bilateral mastectomy. She has also had a hip replacement. She has been in and out of the hospital for her Sarcoidosis and tx as necessary      Went to the ED this past Friday with constant CP and was kept over night. Does not bother her with ambulation but immediately with res, worsening with palpation. Lexiscan Myoview completed and was wnl, troponin neg x4, proBNP 403, CT head wnl. She has taken Aleve-did not help but did not take regularly. Hx of Sarcoidosis-Dr. Azul-Pulmonologist on 7/25/18 SOB, esophageal spasms, albuterol with relief. No changes made at this time.    Headaches-pounding, pulsating sensation back of her head, dizziness-not like her Vertigo in the past--workup completed      Past Medical History:   Diagnosis Date    Acid reflux     h/o    [facility] [address]     Degenerative disc disease, lumbar     Depression with anxiety     Diabetes     Diabetic neuropathy (HCC)     GERD (gastroesophageal reflux disease)     Hyperlipidemia LDL goal < 70     Keloid     pt staes keloid easily    Rheumatoid arthritis(714.0)     Sarcoidosis of lung ([facility] [address]     Dr. Diaz [facility]    Seizure disorder [facility])        Past Surgical History:   Procedure Laterality Date    APPENDECTOMY      BREAST SURGERY  02/2016    Bilateral mastectomy    CARPAL TUNNEL RELEASE Right     LAD    Lower extremity edema -chronic     Non Obstructive CAD and    LV diastolic Dysfunction    Sarcoidosis: Treatment as per Pulmonary      Hyperlipidemia: Continue risk factor modification     DM: Continue blood sugar control         Plan:       Continue current risk modifications    Reviewed all medication and continue    Continue Crestor     Will continue to monitor closely and instructed to call with worsening symptoms. On lasix and tolerating well per patient     On Xarelto-no abnormal bruising or bleeding     She will need to return in follow 6 months    Thank you for letting me participate in this patient's care and please do not hesitate to call me with any questions or concerns.    Ramya Fishman MD, MPH, RPVI          9

## 2018-08-01 ENCOUNTER — TELEPHONE (OUTPATIENT)
Dept: CARDIOLOGY CLINIC | Age: 68
End: 2018-08-01

## 2018-08-01 ENCOUNTER — HOSPITAL ENCOUNTER (OUTPATIENT)
Dept: OTHER | Age: 68
Discharge: HOME | End: 2018-08-31
Attending: ORTHOPAEDIC SURGERY | Admitting: ORTHOPAEDIC SURGERY

## 2018-08-01 ENCOUNTER — HOSPITAL ENCOUNTER (OUTPATIENT)
Dept: PHYSICAL THERAPY | Age: 68
Discharge: HOME | End: 2018-08-02
Admitting: ORTHOPAEDIC SURGERY

## 2018-08-01 ENCOUNTER — TELEPHONE (OUTPATIENT)
Dept: ORTHOPEDIC SURGERY | Age: 68
End: 2018-08-01

## 2018-08-01 DIAGNOSIS — Z96.641 H/O TOTAL HIP ARTHROPLASTY, RIGHT: Primary | ICD-10-CM

## 2018-08-01 NOTE — FLOWSHEET NOTE
Physical Therapy  Cancellation/No-show Note  Patient Name:  Jose Mendez  :  1950   Date:  2018  Cancelled visits to date: 1  No-shows to date: 0    For today's appointment patient:  [x]  Cancelled  []  Rescheduled appointment  []  No-show     Reason given by patient:  []  Patient ill  []  Conflicting appointment  []  No transportation    []  Conflict with work  []  No reason given  [x]  Other:     Comments:  18 Pt arrived in PT today but when asked how she was she described an ED visit that led to hospitalization over the weekend. She indicated she was under observation then released. \"They did not find anything wrong\". She was advised of hospital requirement to have a new MD script indicating she was OK to resume PT. She verbalized understanding.     Electronically signed by:  Kirstie Cobian PT

## 2018-08-01 NOTE — TELEPHONE ENCOUNTER
Patient was admitted to the hospital over the weekend. She is at therapy now and they need a New script if she is to resume therapy. Please order and therapy dept will access in EPIC.

## 2018-08-02 ENCOUNTER — HOSPITAL ENCOUNTER (OUTPATIENT)
Dept: OTHER | Age: 68
Discharge: HOME | End: 2018-08-02
Attending: PHYSICIAN ASSISTANT | Admitting: PHYSICIAN ASSISTANT

## 2018-08-02 DIAGNOSIS — R20.0 ANESTHESIA OF SKIN: ICD-10-CM

## 2018-08-02 PROCEDURE — 95911 NRV CNDJ TEST 9-10 STUDIES: CPT | Performed by: PSYCHIATRY & NEUROLOGY

## 2018-08-02 PROCEDURE — 95886 MUSC TEST DONE W/N TEST COMP: CPT | Performed by: PSYCHIATRY & NEUROLOGY

## 2018-08-02 RX ORDER — METHYLPREDNISOLONE 4 MG/1
TABLET ORAL
Qty: 1 KIT | Refills: 0 | Status: SHIPPED | OUTPATIENT
Start: 2018-08-02 | End: 2018-08-08

## 2018-08-02 NOTE — PROCEDURES
[facility]                      [facility], [address]                               ELECTROMYOGRAM REPORT    PATIENT NAME: Milton Berumen                  :        1950  MED REC NO:   [id number]                          ROOM:  ACCOUNT NO:   [de-identified]                          ADMIT DATE: 2018  PROVIDER:     Anika Gibbs MD    DATE OF EM2018    REASON FOR EMG:  Bilateral arm pain and numbness. SUMMARY:  Bilateral median motor and sensory nerve studies had prolonged  distal latencies. Bilateral ulnar motor nerve studies had severe slowing  of conduction velocities across the elbow. The right ulnar sensory had a  borderline distal latency. The left ulnar sensory was not recordable. The  right radial sensory was normal.  Needle EMG of several muscles in both  upper extremities showed decreased motor units in the first dorsal  interosseous muscles. EMG DIAGNOSES:  1.  Moderately severe bilateral ulnar nerve lesions at the elbow. Both  sides are equally affected. 2.  Moderately severe bilateral median nerve lesions at the wrist (carpal  tunnel syndrome). The left side is relatively more involved than the right  side.         Burton Valverde MD    D: 2018 9:46:15       T: 2018 9:47:49     [email or web address]  Job#: [id number]     Doc#: [id number]    CC:

## 2018-08-07 ENCOUNTER — TELEPHONE (OUTPATIENT)
Dept: ORTHOPEDIC SURGERY | Age: 68
End: 2018-08-07

## 2018-08-16 ENCOUNTER — TELEPHONE (OUTPATIENT)
Dept: ORTHOPEDIC SURGERY | Age: 68
End: 2018-08-16

## 2018-08-16 DIAGNOSIS — M17.12 ARTHRITIS OF LEFT KNEE: Primary | ICD-10-CM

## 2018-08-16 DIAGNOSIS — Z96.641 H/O TOTAL HIP ARTHROPLASTY, RIGHT: ICD-10-CM

## 2018-08-16 DIAGNOSIS — M17.11 ARTHRITIS OF RIGHT KNEE: ICD-10-CM

## 2018-08-21 ENCOUNTER — TELEPHONE (OUTPATIENT)
Dept: CARDIOLOGY CLINIC | Age: 68
End: 2018-08-21

## 2018-08-28 ENCOUNTER — OFFICE VISIT (OUTPATIENT)
Dept: SURGERY | Age: 68
End: 2018-08-28

## 2018-08-28 VITALS
WEIGHT: 214 LBS | HEART RATE: 71 BPM | SYSTOLIC BLOOD PRESSURE: 127 MMHG | BODY MASS INDEX: 30.71 KG/M2 | DIASTOLIC BLOOD PRESSURE: 70 MMHG

## 2018-08-28 DIAGNOSIS — K21.9 HIATAL HERNIA WITH GERD: Primary | ICD-10-CM

## 2018-08-28 DIAGNOSIS — K44.9 HIATAL HERNIA WITH GERD: Primary | ICD-10-CM

## 2018-08-28 PROCEDURE — G8427 DOCREV CUR MEDS BY ELIG CLIN: HCPCS | Performed by: SURGERY

## 2018-08-28 PROCEDURE — 1090F PRES/ABSN URINE INCON ASSESS: CPT | Performed by: SURGERY

## 2018-08-28 PROCEDURE — 1101F PT FALLS ASSESS-DOCD LE1/YR: CPT | Performed by: SURGERY

## 2018-08-28 PROCEDURE — 99213 OFFICE O/P EST LOW 20 MIN: CPT | Performed by: SURGERY

## 2018-08-28 PROCEDURE — G8417 CALC BMI ABV UP PARAM F/U: HCPCS | Performed by: SURGERY

## 2018-08-28 PROCEDURE — 3017F COLORECTAL CA SCREEN DOC REV: CPT | Performed by: SURGERY

## 2018-08-28 RX ORDER — ESOMEPRAZOLE MAGNESIUM 40 MG/1
40 CAPSULE, DELAYED RELEASE ORAL 2 TIMES DAILY
Qty: 60 CAPSULE | Refills: 3 | Status: SHIPPED | OUTPATIENT
Start: 2018-08-28 | End: 2020-01-01

## 2018-08-28 NOTE — LETTER
[facility]17 [facility]"" Vascular Surgery  [address] [facility] [address]  Phone: [phone]  Fax: [phone]    Scott Bishop MD    August 29, 2018     Marquise Harley MD  [address] [facility]. [address]    Patient: Irish Murillo  MR Number: [id number]  YOB: 1950  Date of Visit: 8/28/2018    Dear Dr. Preeti Espinoza:    Thank you for the request for consultation for Robina Baldwin to me for the evaluation of her hiatal hernia. Below are the relevant portions of my assessment and plan of care. The patient does have symptoms consistent with gastric reflux. As she is not on a regularly scheduled PPI yet, I recommended starting a PPI BID to help with her symptoms. I do not think her chest pain or abdominal pain is secondary to her hiatal hernia. I discussed with her that most likely her sarcoidosis was contributing to her chest pain, and that if indicated and we proceed with hiatal hernia repair, that the operation would not take away her pain. She is going to see a GI doctor next week, who will hopefully perform an EGD to evaluate for chronic changes of GERD. If she has esophageal changes and is still symptomatic with GERD despite taking a PPI BID, will consider laparoscopic hiatal hernia repair. She will need manometry to evaluate her esophageal contractility prior to an operation. Discussed weight loss could improve her gastric reflux symptoms. She would like to talk to the weight management center to discuss dietary changes. Consultation placed. Will have her follow up in 1 month after EGD and trial of PPI. If you have questions, please do not hesitate to call me. I look forward to following Nayeli Luna along with you.     Sincerely,      Scott Bishop MD

## 2018-08-28 NOTE — PROGRESS NOTES
[facility] and Vascular Surgery   Jaqueline Mcqueen MD    [address]  [place]  Phone: [phone]  Fax: [phone]    Femi Garcia   YOB: 1950    Date of Visit:  8/28/2018    No ref. provider found  Araceli Greene MD    HPI:     Hiatal hernia / GERD: Patient is 77 y.o. female with a history of sarcoidosis, DM, HTN seen at request of Araceli Greene MD.  Patient presents for evaluation of a hiatal hernia. Problems were first noted a few years ago. She has experienced epigastric pain that occurs intermittently, not always post prandial.  She also has reflux and a sour taste in her mouth after eating. Symptoms are worse with sitting compared to lying down. Current symptoms include upper abdominal discomfort. Patient denies belching and eructation. Symptoms are gradually worsening. She will intermittently take an acid blocker but does not take anything daily. She has a history of sarcoidosis and has seen cardiology and pulmonology, who have attributed her pain to the sarcoidosis. She has a history of colectomy and subsequent ventral hernia repair with mesh. She has had an EGD before, approximately 3 years ago per the patient.          Allergies   Allergen Reactions    Latex Rash    Dye [Iodides] Hives    Molds & Smuts     Pollen Extract     Esomeprazole Sodium Nausea And Vomiting    Ibuprofen Nausea And Vomiting    Lyrica [Pregabalin] Nausea Only and Nausea And Vomiting    Morphine Nausea And Vomiting     Outpatient Prescriptions Marked as Taking for the 8/28/18 encounter (Office Visit) with Yuniel Tate MD   Medication Sig Dispense Refill    vitamin B-12 (CYANOCOBALAMIN) 500 MCG tablet Take 500 mcg by mouth daily      vitamin D (CHOLECALCIFEROL) 1000 UNIT TABS tablet Take 1,000 Units by mouth daily      Multiple Vitamins-Minerals (THERAPEUTIC MULTIVITAMIN-MINERALS) tablet Take 1 tablet by mouth daily      cephALEXin (KEFLEX) 500 MG capsule Take 2 capsules by mouth one hour before and 2 capsules one hour after dental procedures. This is for 3 visits 12 capsule 0    insulin lispro (HUMALOG) 100 UNIT/ML injection vial Inject 18 Units into the skin 3 times daily (before meals) Will adjust based on BS      Ascorbic Acid (VITAMIN C) 1000 MG tablet Take by mouth      anastrozole (ARIMIDEX) 1 MG tablet Take 1 mg by mouth daily      metFORMIN (GLUCOPHAGE) 500 MG tablet Take 1,000 mg by mouth 2 times daily (with meals) Pt takes 2-4 tabs daily depending on BS      furosemide (LASIX) 40 MG tablet TAKE 1 TABLET BY MOUTH ONCE DAILY AS DIRECTED 60 tablet 3    lubiprostone (AMITIZA) 8 MCG CAPS capsule TAKE ONE (1) CAPSULE BY MOUTH TWO (2) TIMES DAILY WITH MEALS (Patient taking differently: Take 2 tabs in am and will repeat in evening if needed for IBS) 60 capsule 3    EASY TOUCH INSULIN SYRINGE 30G X 5/16\" 1 ML MISC       Blood Glucose Calibration (PRODIGY CONTROL SOLUTION) LOW SOLN       Blood Glucose Monitoring Suppl (PRODIGY AUTOCODE BLOOD GLUCOSE) W/DEVICE KIT       Blood Glucose Monitoring Suppl ROSEANNE 1 each by Does not apply route three times daily Ok to dispense whichever brand is covered by insurance 1 Device 0    Glucose Blood (BLOOD GLUCOSE TEST STRIPS) STRP Test three times daily. Ok to dispense whichever brand is covered by insurance. 100 strip 3    Lancets MISC 1 each by Does not apply route 3 times daily Ok to dispense whichever brand is covered by insurance 100 each 3    Blood Glucose Calibration (PRODIGY CONTROL SOLUTION) HIGH SOLN 1 each by In Vitro route three times daily Test before using machine. Ok to dispense which ever brand is covered by insurance. 1 each 0    Insulin Pen Needle (PEN NEEDLES 31GX5/16\") 31G X 8 MM MISC 1 each by Does not apply route daily.  100 each 12    insulin glargine (LANTUS) 100 UNIT/ML injection vial Inject 50 Units into the skin  Smoking status: Former Smoker     Packs/day: 0.70     Years: 2.00     Types: Cigarettes     Quit date: 5/18/1971    Smokeless tobacco: Never Used    Alcohol use No      Comment: rare    Drug use: No    Sexual activity: Yes     Partners: Male     Other Topics Concern    Not on file     Social History Narrative    No narrative on file          Vitals:    08/28/18 0950   BP: 127/70   Pulse: 71   Weight: 214 lb (97.1 kg)      Body mass index is 30.71 kg/m². Wt Readings from Last 3 Encounters:   08/28/18 214 lb (97.1 kg)   07/30/18 215 lb (97.5 kg)   07/28/18 220 lb 0.3 oz (99.8 kg)     BP Readings from Last 3 Encounters:   08/28/18 127/70   07/30/18 118/78   07/28/18 119/60          REVIEW OF SYSTEMS:   Pertinent positives are in HPI, otherwise all systems reviewed and negative    PHYSICAL EXAM:    CONSTITUTIONAL:  awake, alert, no apparent distress and mildly obese  ENT:  normocephalic, without obvious abnormality  NECK:  supple, symmetrical, trachea midline   LUNGS:  Resp easy and unlabored  CARDIOVASCULAR:  regular rate and rhythm  ABDOMEN:  Midline incision well healed, soft, non-distended, non-tender, voluntary guarding absent, and hernia absent  MUSCULOSKELETAL: No edema  NEUROLOGIC:  Mental Status Exam:  Level of Alertness:   awake  Orientation:  person, place, time        DATA:  No results for input(s): WBC, HGB, HCT, PLT, NA, K, CL, CO2, BUN, CREATININE, MG, PHOS, CALCIUM, PTT, INR, AST, ALT, BILITOT, BILIDIR, NITRU, COLORU, BACTERIA in the last 72 hours.     Invalid input(s): PT, WBCU, RBCU, LEUKOCYTESUA  CBC with Differential:    Lab Results   Component Value Date    WBC 5.6 07/28/2018    RBC 4.19 07/28/2018    HGB 12.2 07/28/2018    HCT 36.3 07/28/2018     07/28/2018    MCV 86.7 07/28/2018    MCH 29.0 07/28/2018    MCHC 33.5 07/28/2018    RDW 13.2 07/28/2018    SEGSPCT 53 03/30/2012    LYMPHOPCT 18.3 07/28/2018    MONOPCT 4.6 07/28/2018    EOSPCT 3 03/30/2012    BASOPCT 0.3 07/28/2018

## 2018-08-30 ENCOUNTER — OFFICE VISIT (OUTPATIENT)
Dept: ORTHOPEDIC SURGERY | Age: 68
End: 2018-08-30

## 2018-08-30 VITALS — WEIGHT: 214 LBS | BODY MASS INDEX: 30.64 KG/M2 | HEIGHT: 70 IN | TEMPERATURE: 97.5 F

## 2018-08-30 DIAGNOSIS — M25.552 LEFT HIP PAIN: ICD-10-CM

## 2018-08-30 DIAGNOSIS — M16.11 ARTHRITIS OF RIGHT HIP: Primary | ICD-10-CM

## 2018-08-30 DIAGNOSIS — M17.12 ARTHRITIS OF LEFT KNEE: ICD-10-CM

## 2018-08-30 DIAGNOSIS — Z96.641 H/O TOTAL HIP ARTHROPLASTY, RIGHT: ICD-10-CM

## 2018-08-30 DIAGNOSIS — M17.11 ARTHRITIS OF RIGHT KNEE: ICD-10-CM

## 2018-08-30 PROCEDURE — 20610 DRAIN/INJ JOINT/BURSA W/O US: CPT | Performed by: PHYSICIAN ASSISTANT

## 2018-08-30 PROCEDURE — 3017F COLORECTAL CA SCREEN DOC REV: CPT | Performed by: PHYSICIAN ASSISTANT

## 2018-08-30 PROCEDURE — G8400 PT W/DXA NO RESULTS DOC: HCPCS | Performed by: PHYSICIAN ASSISTANT

## 2018-08-30 PROCEDURE — G8598 ASA/ANTIPLAT THER USED: HCPCS | Performed by: PHYSICIAN ASSISTANT

## 2018-08-30 PROCEDURE — 1101F PT FALLS ASSESS-DOCD LE1/YR: CPT | Performed by: PHYSICIAN ASSISTANT

## 2018-08-30 PROCEDURE — G8417 CALC BMI ABV UP PARAM F/U: HCPCS | Performed by: PHYSICIAN ASSISTANT

## 2018-08-30 PROCEDURE — 4040F PNEUMOC VAC/ADMIN/RCVD: CPT | Performed by: PHYSICIAN ASSISTANT

## 2018-08-30 PROCEDURE — 1090F PRES/ABSN URINE INCON ASSESS: CPT | Performed by: PHYSICIAN ASSISTANT

## 2018-08-30 PROCEDURE — 99213 OFFICE O/P EST LOW 20 MIN: CPT | Performed by: PHYSICIAN ASSISTANT

## 2018-08-30 PROCEDURE — 1123F ACP DISCUSS/DSCN MKR DOCD: CPT | Performed by: PHYSICIAN ASSISTANT

## 2018-08-30 PROCEDURE — 1036F TOBACCO NON-USER: CPT | Performed by: PHYSICIAN ASSISTANT

## 2018-08-30 PROCEDURE — G8427 DOCREV CUR MEDS BY ELIG CLIN: HCPCS | Performed by: PHYSICIAN ASSISTANT

## 2018-08-31 ENCOUNTER — TELEPHONE (OUTPATIENT)
Dept: CARDIOLOGY CLINIC | Age: 68
End: 2018-08-31

## 2018-08-31 ENCOUNTER — OFFICE VISIT (OUTPATIENT)
Dept: ORTHOPEDIC SURGERY | Age: 68
End: 2018-08-31

## 2018-08-31 VITALS
SYSTOLIC BLOOD PRESSURE: 127 MMHG | HEART RATE: 70 BPM | HEIGHT: 70 IN | BODY MASS INDEX: 30.64 KG/M2 | WEIGHT: 214 LBS | DIASTOLIC BLOOD PRESSURE: 69 MMHG | RESPIRATION RATE: 16 BRPM

## 2018-08-31 DIAGNOSIS — G56.23 CUBITAL TUNNEL SYNDROME OF BOTH UPPER EXTREMITIES: ICD-10-CM

## 2018-08-31 DIAGNOSIS — G56.03 BILATERAL CARPAL TUNNEL SYNDROME: Primary | ICD-10-CM

## 2018-08-31 PROCEDURE — 1123F ACP DISCUSS/DSCN MKR DOCD: CPT | Performed by: ORTHOPAEDIC SURGERY

## 2018-08-31 PROCEDURE — G8427 DOCREV CUR MEDS BY ELIG CLIN: HCPCS | Performed by: ORTHOPAEDIC SURGERY

## 2018-08-31 PROCEDURE — 4040F PNEUMOC VAC/ADMIN/RCVD: CPT | Performed by: ORTHOPAEDIC SURGERY

## 2018-08-31 PROCEDURE — G8598 ASA/ANTIPLAT THER USED: HCPCS | Performed by: ORTHOPAEDIC SURGERY

## 2018-08-31 PROCEDURE — G8417 CALC BMI ABV UP PARAM F/U: HCPCS | Performed by: ORTHOPAEDIC SURGERY

## 2018-08-31 PROCEDURE — 1036F TOBACCO NON-USER: CPT | Performed by: ORTHOPAEDIC SURGERY

## 2018-08-31 PROCEDURE — G8400 PT W/DXA NO RESULTS DOC: HCPCS | Performed by: ORTHOPAEDIC SURGERY

## 2018-08-31 PROCEDURE — 99214 OFFICE O/P EST MOD 30 MIN: CPT | Performed by: ORTHOPAEDIC SURGERY

## 2018-08-31 PROCEDURE — 1101F PT FALLS ASSESS-DOCD LE1/YR: CPT | Performed by: ORTHOPAEDIC SURGERY

## 2018-08-31 PROCEDURE — 1090F PRES/ABSN URINE INCON ASSESS: CPT | Performed by: ORTHOPAEDIC SURGERY

## 2018-08-31 PROCEDURE — 3017F COLORECTAL CA SCREEN DOC REV: CPT | Performed by: ORTHOPAEDIC SURGERY

## 2018-08-31 NOTE — LETTER
[address] [place]  Phone: [phone]  Fax: [phone]    Helder Escobar MD        September 4, 2018     Patient: Gerson Stevenson   YOB: 1950           To Whom It May Concern: It is my medical opinion that Joseph Laboy may proceed with bilateral carpal and cubital tunnel procedures with Dr. Sweta Juardo. If you have any questions or concerns, please don't hesitate to call.     Sincerely,        Helder Escobar MD

## 2018-08-31 NOTE — TELEPHONE ENCOUNTER
Last ov with Dr. Rocco Ortega 7/30/18:      Chest pain atypical reproducible   No indication for ACS  Chest pain; FFR was 0.82 of LAD    Lower extremity edema    Non Obstructive CAD and    LV diastolic Dysfunction    Sarcoidosis: Treatment as per Pulmonary      Pt is wanting to have bilateral carpal and cubital tunnel procedures by Dr. Castro Lynn. Procedure is not scheduled yet. Please advise, thank you.

## 2018-08-31 NOTE — Clinical Note
Dear  Zari Evans MD,  Thank you very much for your referral or Ms. Amy Baker to me for evaluation and treatment of her Hand & Wrist condition. I appreciate your confidence in me and thank you for allowing me the opportunity to care for your patients. If I can be of any further assistance to you on this or any other patient, please do not hesitate to contact me. Sincerely,  Pamela Hendrix.  Gifty Pacheco MD

## 2018-09-01 ENCOUNTER — HOSPITAL ENCOUNTER (OUTPATIENT)
Dept: OTHER | Age: 68
Discharge: HOME | End: 2018-09-01
Attending: ORTHOPAEDIC SURGERY | Admitting: ORTHOPAEDIC SURGERY

## 2018-09-01 NOTE — PROGRESS NOTES
Ms. Romina Martinez returns today in follow-up of her previously treated  bilateral neurologic symptoms of the upper extremities . She was last seen in May, 2018 at which time she was treated with Conservative Measures and EMG & NCS was ordered. She experienced no relief of her initial symptoms. She  has noticed symptom recurrence over the last several months. She returns today with continued symptoms of bilateral upper extremities humaneness tingling, pain, requesting further treatment. The patient's , past medical history, medications, allergies,  family history, social history, and review of systems have been reviewed and are recorded in the chart. Physical Exam:  Vitals  BP: 127/69  Pulse: 70  Resp: 16  Height: 5' 10\" (177.8 cm)  Weight: 214 lb (97.1 kg)  Ms. Romina Martinez appears well, she is in no apparent distress, she demonstrates appropriate mood & affect. Skin: Skin color, texture, turgor normal. No rashes or lesions bilaterally  Digital range of motion is equal bilateral   Wrist range of motion is full bilaterally  Elbow range of motion is full bilaterally  There is no evidence of gross joint instability bilaterally. Sensation is subjectively numb and tingling in the Whole Hand bilaterally and objectively present in the same distribution bilaterally  Vascular examination reveals normal and good capillary refill bilaterally  Swelling is absent in the medial elbow, there is no tenderness at the medial epicondyle bilaterally  Examination for Cubital Tunnel Syndrome shows no tenderness to palpation at the medial epicondyle bilaterally. The Ulnar Nerve rests behind the medial epicondyle without subluxation upon elbow flexion bilaterally. Elbow flexion-compression test is mildly positive, and there is an active Tinnel's Sign over the Cubital Tunnel bilaterally.   The Ulnar Nerve innervated intrinsic musculature is not atrophied & weakened bilaterally    Review of Electrodiagnostic Testing:  Test performed on: 8/2/18    NERVE CONDUCTION STUDY:  RIGHT   Median Nerve: Sensory Latency: 4.1  Motor Latency: 4.2  Ulnar Nerve:  Conduction Velocity:  28    LEFT  Median Nerve: Sensory Latency: 4.5  Motor Latency: 5.2  Ulnar Nerve:  Conduction Velocity:  26    EMG:  RIGHT  Abnormal  1st Michelle    LEFT  Abnormal  1st Michelle          Impression:  Ms. Cornell Horton is showing evidence of persistent and severe bilateral Cubital Tunnel Syndrome with more mild carpal tunnel syndrome after previous treatment. She requests additional treatment at this time. Plan:    After discussion of the treatment options available for cubital tunnel syndrome and review of her past treatments, I have outlined for Ms. Cornell Horton the surgical treatment of Ulnar Nerve entrapment at the elbow and release of the Cubital Tunnel. I have discussed the details of the surgical procedure, the pre, kuldeep and postoperative concerns and the appropriate expectations after surgery. She was given the opportunity to ask questions, voiced an understanding of the procedure, and she did wish to proceed with bilateral, staged Ulnar Nerve Decompression at the Elbow. I had an extensive discussion with Ms. Cornell Horton and any family members present regarding the natural history, etiology, and long term consequences of this problem. I have outlined a treatment plan with them and, in my opinion, surgical intervention is indicated at this time. I have discussed with them the potential complications, limitations, expectations, alternatives, and risks of Cubital Tunnel Release(s). They have had full opportunity to ask their questions. I have answered them all to their satisfaction. I feel that the patient and any present family members do understand our discussion today and they have provided informed consent for bilateral, staged Ulnar Nerve Decompression at the Elbow.      After discussion of the treatment options available for continue her care at any time in the future.

## 2018-09-12 ENCOUNTER — TELEPHONE (OUTPATIENT)
Dept: BARIATRICS/WEIGHT MGMT | Age: 68
End: 2018-09-12

## 2018-09-17 ENCOUNTER — TELEPHONE (OUTPATIENT)
Dept: BARIATRICS/WEIGHT MGMT | Age: 68
End: 2018-09-17

## 2018-09-17 NOTE — TELEPHONE ENCOUNTER
Called and left patient a vm to let her know that she does not qualify for the surgical program due to her BMI being below 35. Latest BMI shows 30.71. Patient was also left information about the non-surgical program and given advice to call insurance to see if medical weight would be covered for her.

## 2018-11-07 PROBLEM — M51.26 DISPLACEMENT OF LUMBAR INTERVERTEBRAL DISC WITHOUT MYELOPATHY: Chronic | Status: ACTIVE | Noted: 2018-11-07

## 2018-11-07 PROBLEM — M51.37 DEGENERATION OF LUMBAR OR LUMBOSACRAL INTERVERTEBRAL DISC: Status: ACTIVE | Noted: 2018-11-07

## 2018-11-07 PROBLEM — M54.16 LUMBAR RADICULOPATHY: Chronic | Status: ACTIVE | Noted: 2018-11-07

## 2018-11-07 PROBLEM — M51.37 DEGENERATION OF LUMBAR OR LUMBOSACRAL INTERVERTEBRAL DISC: Chronic | Status: ACTIVE | Noted: 2018-11-07

## 2018-11-07 PROBLEM — M54.16 LUMBAR RADICULOPATHY: Status: ACTIVE | Noted: 2018-11-07

## 2018-11-07 PROBLEM — M51.26 DISPLACEMENT OF LUMBAR INTERVERTEBRAL DISC WITHOUT MYELOPATHY: Status: ACTIVE | Noted: 2018-11-07

## 2018-12-11 ENCOUNTER — TELEPHONE (OUTPATIENT)
Dept: ORTHOPEDIC SURGERY | Age: 68
End: 2018-12-11

## 2018-12-21 ENCOUNTER — HOSPITAL ENCOUNTER (OUTPATIENT)
Dept: MRI IMAGING | Age: 68
Discharge: HOME | End: 2018-12-21
Payer: MEDICARE

## 2018-12-21 DIAGNOSIS — M51.37 DEGENERATION OF LUMBAR OR LUMBOSACRAL INTERVERTEBRAL DISC: ICD-10-CM

## 2018-12-21 PROCEDURE — 72148 MRI LUMBAR SPINE W/O DYE: CPT

## 2019-01-01 ENCOUNTER — TELEPHONE (OUTPATIENT)
Dept: ORTHOPEDIC SURGERY | Age: 69
End: 2019-01-01

## 2019-01-01 ENCOUNTER — TELEPHONE (OUTPATIENT)
Dept: ORTHOPEDICS UNIT | Age: 69
End: 2019-01-01

## 2019-01-01 ENCOUNTER — HOSPITAL ENCOUNTER (OUTPATIENT)
Age: 69
Setting detail: OUTPATIENT SURGERY
Discharge: HOME | End: 2019-05-07
Attending: ORTHOPAEDIC SURGERY | Admitting: ORTHOPAEDIC SURGERY
Payer: MEDICARE

## 2019-01-01 ENCOUNTER — HOSPITAL ENCOUNTER (OUTPATIENT)
Dept: CT IMAGING | Age: 69
Discharge: HOME | End: 2019-11-04
Payer: MEDICARE

## 2019-01-01 ENCOUNTER — OFFICE VISIT (OUTPATIENT)
Dept: ORTHOPEDIC SURGERY | Age: 69
End: 2019-01-01
Payer: MEDICARE

## 2019-01-01 ENCOUNTER — OFFICE VISIT (OUTPATIENT)
Dept: CARDIOLOGY CLINIC | Age: 69
End: 2019-01-01
Payer: MEDICARE

## 2019-01-01 ENCOUNTER — OFFICE VISIT (OUTPATIENT)
Dept: ORTHOPEDIC SURGERY | Age: 69
End: 2019-01-01

## 2019-01-01 ENCOUNTER — ANESTHESIA (OUTPATIENT)
Dept: OPERATING ROOM | Age: 69
End: 2019-01-01
Payer: MEDICARE

## 2019-01-01 ENCOUNTER — PREP FOR PROCEDURE (OUTPATIENT)
Dept: ORTHOPEDIC SURGERY | Age: 69
End: 2019-01-01

## 2019-01-01 ENCOUNTER — ANESTHESIA EVENT (OUTPATIENT)
Dept: OPERATING ROOM | Age: 69
End: 2019-01-01
Payer: MEDICARE

## 2019-01-01 VITALS
BODY MASS INDEX: 31.06 KG/M2 | HEIGHT: 70 IN | OXYGEN SATURATION: 98 % | HEART RATE: 70 BPM | TEMPERATURE: 98 F | SYSTOLIC BLOOD PRESSURE: 135 MMHG | RESPIRATION RATE: 12 BRPM | WEIGHT: 216.93 LBS | DIASTOLIC BLOOD PRESSURE: 70 MMHG

## 2019-01-01 VITALS
BODY MASS INDEX: 32.14 KG/M2 | TEMPERATURE: 96.4 F | HEART RATE: 61 BPM | DIASTOLIC BLOOD PRESSURE: 62 MMHG | RESPIRATION RATE: 16 BRPM | HEIGHT: 69 IN | WEIGHT: 217 LBS | SYSTOLIC BLOOD PRESSURE: 117 MMHG

## 2019-01-01 VITALS
SYSTOLIC BLOOD PRESSURE: 123 MMHG | HEART RATE: 62 BPM | BODY MASS INDEX: 30.92 KG/M2 | WEIGHT: 216 LBS | DIASTOLIC BLOOD PRESSURE: 72 MMHG | HEIGHT: 70 IN | TEMPERATURE: 97.4 F

## 2019-01-01 VITALS
DIASTOLIC BLOOD PRESSURE: 56 MMHG | OXYGEN SATURATION: 98 % | SYSTOLIC BLOOD PRESSURE: 113 MMHG | RESPIRATION RATE: 9 BRPM

## 2019-01-01 VITALS — RESPIRATION RATE: 16 BRPM | BODY MASS INDEX: 30.92 KG/M2 | HEIGHT: 70 IN | WEIGHT: 216 LBS

## 2019-01-01 VITALS
HEIGHT: 69 IN | OXYGEN SATURATION: 98 % | BODY MASS INDEX: 32.14 KG/M2 | SYSTOLIC BLOOD PRESSURE: 108 MMHG | HEART RATE: 57 BPM | WEIGHT: 217 LBS | DIASTOLIC BLOOD PRESSURE: 74 MMHG

## 2019-01-01 VITALS — WEIGHT: 216 LBS | BODY MASS INDEX: 30.92 KG/M2 | HEIGHT: 70 IN

## 2019-01-01 VITALS
HEIGHT: 69 IN | WEIGHT: 217 LBS | SYSTOLIC BLOOD PRESSURE: 120 MMHG | DIASTOLIC BLOOD PRESSURE: 74 MMHG | BODY MASS INDEX: 32.14 KG/M2 | OXYGEN SATURATION: 99 % | HEART RATE: 68 BPM

## 2019-01-01 DIAGNOSIS — G56.03 BILATERAL CARPAL TUNNEL SYNDROME: ICD-10-CM

## 2019-01-01 DIAGNOSIS — M17.11 PRIMARY OSTEOARTHRITIS OF RIGHT KNEE: Primary | ICD-10-CM

## 2019-01-01 DIAGNOSIS — M65.331 TRIGGER MIDDLE FINGER OF RIGHT HAND: Primary | ICD-10-CM

## 2019-01-01 DIAGNOSIS — I25.10 CORONARY ARTERY DISEASE INVOLVING NATIVE CORONARY ARTERY OF NATIVE HEART WITHOUT ANGINA PECTORIS: Primary | Chronic | ICD-10-CM

## 2019-01-01 DIAGNOSIS — I10 ESSENTIAL HYPERTENSION: ICD-10-CM

## 2019-01-01 DIAGNOSIS — Z01.810 PREOPERATIVE CARDIOVASCULAR EXAMINATION: Primary | ICD-10-CM

## 2019-01-01 DIAGNOSIS — M17.11 ARTHRITIS OF RIGHT KNEE: Primary | ICD-10-CM

## 2019-01-01 DIAGNOSIS — Z96.641 H/O TOTAL HIP ARTHROPLASTY, RIGHT: ICD-10-CM

## 2019-01-01 DIAGNOSIS — M17.12 ARTHRITIS OF LEFT KNEE: Primary | ICD-10-CM

## 2019-01-01 DIAGNOSIS — G56.23 CUBITAL TUNNEL SYNDROME OF BOTH UPPER EXTREMITIES: ICD-10-CM

## 2019-01-01 DIAGNOSIS — Z01.811 PRE-OP CHEST EXAM: ICD-10-CM

## 2019-01-01 DIAGNOSIS — I25.10 CORONARY ARTERY DISEASE INVOLVING NATIVE CORONARY ARTERY OF NATIVE HEART WITHOUT ANGINA PECTORIS: Chronic | ICD-10-CM

## 2019-01-01 DIAGNOSIS — M17.11 ARTHRITIS OF RIGHT KNEE: ICD-10-CM

## 2019-01-01 LAB
GLUCOSE BLD-MCNC: 214 MG/DL (ref 70–99)
GLUCOSE BLD-MCNC: 230 MG/DL (ref 70–99)
PERFORMED ON: ABNORMAL
PERFORMED ON: ABNORMAL

## 2019-01-01 PROCEDURE — G8427 DOCREV CUR MEDS BY ELIG CLIN: HCPCS | Performed by: ORTHOPAEDIC SURGERY

## 2019-01-01 PROCEDURE — 1090F PRES/ABSN URINE INCON ASSESS: CPT | Performed by: INTERNAL MEDICINE

## 2019-01-01 PROCEDURE — 2500000003 HC RX 250 WO HCPCS: Performed by: NURSE ANESTHETIST, CERTIFIED REGISTERED

## 2019-01-01 PROCEDURE — 4040F PNEUMOC VAC/ADMIN/RCVD: CPT | Performed by: INTERNAL MEDICINE

## 2019-01-01 PROCEDURE — 93000 ELECTROCARDIOGRAM COMPLETE: CPT | Performed by: INTERNAL MEDICINE

## 2019-01-01 PROCEDURE — 99213 OFFICE O/P EST LOW 20 MIN: CPT | Performed by: ORTHOPAEDIC SURGERY

## 2019-01-01 PROCEDURE — G8417 CALC BMI ABV UP PARAM F/U: HCPCS | Performed by: INTERNAL MEDICINE

## 2019-01-01 PROCEDURE — 3017F COLORECTAL CA SCREEN DOC REV: CPT | Performed by: ORTHOPAEDIC SURGERY

## 2019-01-01 PROCEDURE — 99214 OFFICE O/P EST MOD 30 MIN: CPT | Performed by: INTERNAL MEDICINE

## 2019-01-01 PROCEDURE — 1036F TOBACCO NON-USER: CPT | Performed by: INTERNAL MEDICINE

## 2019-01-01 PROCEDURE — 4040F PNEUMOC VAC/ADMIN/RCVD: CPT | Performed by: ORTHOPAEDIC SURGERY

## 2019-01-01 PROCEDURE — G8484 FLU IMMUNIZE NO ADMIN: HCPCS | Performed by: INTERNAL MEDICINE

## 2019-01-01 PROCEDURE — G8417 CALC BMI ABV UP PARAM F/U: HCPCS | Performed by: ORTHOPAEDIC SURGERY

## 2019-01-01 PROCEDURE — 3017F COLORECTAL CA SCREEN DOC REV: CPT | Performed by: INTERNAL MEDICINE

## 2019-01-01 PROCEDURE — 3700000000 HC ANESTHESIA ATTENDED CARE: Performed by: ORTHOPAEDIC SURGERY

## 2019-01-01 PROCEDURE — G8598 ASA/ANTIPLAT THER USED: HCPCS | Performed by: INTERNAL MEDICINE

## 2019-01-01 PROCEDURE — G8599 NO ASA/ANTIPLAT THER USE RNG: HCPCS | Performed by: INTERNAL MEDICINE

## 2019-01-01 PROCEDURE — 2500000003 HC RX 250 WO HCPCS: Performed by: ANESTHESIOLOGY

## 2019-01-01 PROCEDURE — 1123F ACP DISCUSS/DSCN MKR DOCD: CPT | Performed by: ORTHOPAEDIC SURGERY

## 2019-01-01 PROCEDURE — G8400 PT W/DXA NO RESULTS DOC: HCPCS | Performed by: ORTHOPAEDIC SURGERY

## 2019-01-01 PROCEDURE — 7100000000 HC PACU RECOVERY - FIRST 15 MIN: Performed by: ORTHOPAEDIC SURGERY

## 2019-01-01 PROCEDURE — G8428 CUR MEDS NOT DOCUMENT: HCPCS | Performed by: INTERNAL MEDICINE

## 2019-01-01 PROCEDURE — G8427 DOCREV CUR MEDS BY ELIG CLIN: HCPCS | Performed by: INTERNAL MEDICINE

## 2019-01-01 PROCEDURE — 2580000003 HC RX 258: Performed by: ANESTHESIOLOGY

## 2019-01-01 PROCEDURE — 99213 OFFICE O/P EST LOW 20 MIN: CPT | Performed by: INTERNAL MEDICINE

## 2019-01-01 PROCEDURE — G8599 NO ASA/ANTIPLAT THER USE RNG: HCPCS | Performed by: ORTHOPAEDIC SURGERY

## 2019-01-01 PROCEDURE — 99024 POSTOP FOLLOW-UP VISIT: CPT | Performed by: ORTHOPAEDIC SURGERY

## 2019-01-01 PROCEDURE — 2580000003 HC RX 258: Performed by: NURSE ANESTHETIST, CERTIFIED REGISTERED

## 2019-01-01 PROCEDURE — 2500000003 HC RX 250 WO HCPCS: Performed by: ORTHOPAEDIC SURGERY

## 2019-01-01 PROCEDURE — G8598 ASA/ANTIPLAT THER USED: HCPCS | Performed by: ORTHOPAEDIC SURGERY

## 2019-01-01 PROCEDURE — 1090F PRES/ABSN URINE INCON ASSESS: CPT | Performed by: ORTHOPAEDIC SURGERY

## 2019-01-01 PROCEDURE — 7100000010 HC PHASE II RECOVERY - FIRST 15 MIN: Performed by: ORTHOPAEDIC SURGERY

## 2019-01-01 PROCEDURE — 1036F TOBACCO NON-USER: CPT | Performed by: ORTHOPAEDIC SURGERY

## 2019-01-01 PROCEDURE — 20610 DRAIN/INJ JOINT/BURSA W/O US: CPT | Performed by: ORTHOPAEDIC SURGERY

## 2019-01-01 PROCEDURE — 7100000001 HC PACU RECOVERY - ADDTL 15 MIN: Performed by: ORTHOPAEDIC SURGERY

## 2019-01-01 PROCEDURE — 1123F ACP DISCUSS/DSCN MKR DOCD: CPT | Performed by: INTERNAL MEDICINE

## 2019-01-01 PROCEDURE — 6360000002 HC RX W HCPCS: Performed by: NURSE ANESTHETIST, CERTIFIED REGISTERED

## 2019-01-01 PROCEDURE — G8400 PT W/DXA NO RESULTS DOC: HCPCS | Performed by: INTERNAL MEDICINE

## 2019-01-01 PROCEDURE — 3600000005 HC SURGERY LEVEL 5 BASE: Performed by: ORTHOPAEDIC SURGERY

## 2019-01-01 PROCEDURE — 3700000001 HC ADD 15 MINUTES (ANESTHESIA): Performed by: ORTHOPAEDIC SURGERY

## 2019-01-01 PROCEDURE — 3600000015 HC SURGERY LEVEL 5 ADDTL 15MIN: Performed by: ORTHOPAEDIC SURGERY

## 2019-01-01 PROCEDURE — 2709999900 HC NON-CHARGEABLE SUPPLY: Performed by: ORTHOPAEDIC SURGERY

## 2019-01-01 PROCEDURE — 7100000011 HC PHASE II RECOVERY - ADDTL 15 MIN: Performed by: ORTHOPAEDIC SURGERY

## 2019-01-01 PROCEDURE — 73700 CT LOWER EXTREMITY W/O DYE: CPT

## 2019-01-01 RX ORDER — IPRATROPIUM BROMIDE AND ALBUTEROL SULFATE 2.5; .5 MG/3ML; MG/3ML
1 SOLUTION RESPIRATORY (INHALATION) EVERY 4 HOURS PRN
COMMUNITY

## 2019-01-01 RX ORDER — SODIUM CHLORIDE 0.9 % (FLUSH) 0.9 %
10 SYRINGE (ML) INJECTION EVERY 12 HOURS SCHEDULED
Status: DISCONTINUED | OUTPATIENT
Start: 2019-01-01 | End: 2019-01-01 | Stop reason: HOSPADM

## 2019-01-01 RX ORDER — OXYCODONE HYDROCHLORIDE 5 MG/1
5 TABLET ORAL PRN
Status: DISCONTINUED | OUTPATIENT
Start: 2019-01-01 | End: 2019-01-01 | Stop reason: HOSPADM

## 2019-01-01 RX ORDER — FENTANYL CITRATE 50 UG/ML
50 INJECTION, SOLUTION INTRAMUSCULAR; INTRAVENOUS EVERY 5 MIN PRN
Status: DISCONTINUED | OUTPATIENT
Start: 2019-01-01 | End: 2019-01-01 | Stop reason: HOSPADM

## 2019-01-01 RX ORDER — OXYCODONE HYDROCHLORIDE 5 MG/1
10 TABLET ORAL PRN
Status: DISCONTINUED | OUTPATIENT
Start: 2019-01-01 | End: 2019-01-01 | Stop reason: HOSPADM

## 2019-01-01 RX ORDER — LIDOCAINE HYDROCHLORIDE 20 MG/ML
INJECTION, SOLUTION EPIDURAL; INFILTRATION; INTRACAUDAL; PERINEURAL PRN
Status: DISCONTINUED | OUTPATIENT
Start: 2019-01-01 | End: 2019-01-01 | Stop reason: SDUPTHER

## 2019-01-01 RX ORDER — MORPHINE SULFATE 2 MG/ML
1 INJECTION, SOLUTION INTRAMUSCULAR; INTRAVENOUS EVERY 5 MIN PRN
Status: DISCONTINUED | OUTPATIENT
Start: 2019-01-01 | End: 2019-01-01 | Stop reason: HOSPADM

## 2019-01-01 RX ORDER — ROSUVASTATIN CALCIUM 10 MG/1
10 TABLET, COATED ORAL DAILY
Qty: 30 TABLET | Refills: 3 | Status: SHIPPED | OUTPATIENT
Start: 2019-01-01

## 2019-01-01 RX ORDER — SODIUM CHLORIDE 0.9 % (FLUSH) 0.9 %
10 SYRINGE (ML) INJECTION PRN
Status: DISCONTINUED | OUTPATIENT
Start: 2019-01-01 | End: 2019-01-01 | Stop reason: HOSPADM

## 2019-01-01 RX ORDER — PROPOFOL 10 MG/ML
INJECTION, EMULSION INTRAVENOUS PRN
Status: DISCONTINUED | OUTPATIENT
Start: 2019-01-01 | End: 2019-01-01 | Stop reason: SDUPTHER

## 2019-01-01 RX ORDER — SODIUM CHLORIDE 9 MG/ML
INJECTION, SOLUTION INTRAVENOUS CONTINUOUS
Status: DISCONTINUED | OUTPATIENT
Start: 2019-01-01 | End: 2019-01-01 | Stop reason: HOSPADM

## 2019-01-01 RX ORDER — ONDANSETRON 2 MG/ML
4 INJECTION INTRAMUSCULAR; INTRAVENOUS
Status: DISCONTINUED | OUTPATIENT
Start: 2019-01-01 | End: 2019-01-01 | Stop reason: HOSPADM

## 2019-01-01 RX ORDER — FENTANYL CITRATE 50 UG/ML
INJECTION, SOLUTION INTRAMUSCULAR; INTRAVENOUS PRN
Status: DISCONTINUED | OUTPATIENT
Start: 2019-01-01 | End: 2019-01-01 | Stop reason: SDUPTHER

## 2019-01-01 RX ORDER — SERTRALINE HYDROCHLORIDE 25 MG/1
25 TABLET, FILM COATED ORAL DAILY
COMMUNITY

## 2019-01-01 RX ORDER — GLYCOPYRROLATE 0.2 MG/ML
INJECTION INTRAMUSCULAR; INTRAVENOUS PRN
Status: DISCONTINUED | OUTPATIENT
Start: 2019-01-01 | End: 2019-01-01 | Stop reason: SDUPTHER

## 2019-01-01 RX ORDER — BUPIVACAINE HYDROCHLORIDE 5 MG/ML
INJECTION, SOLUTION EPIDURAL; INTRACAUDAL
Status: COMPLETED | OUTPATIENT
Start: 2019-01-01 | End: 2019-01-01

## 2019-01-01 RX ORDER — MEPERIDINE HYDROCHLORIDE 25 MG/ML
12.5 INJECTION INTRAMUSCULAR; INTRAVENOUS; SUBCUTANEOUS EVERY 5 MIN PRN
Status: DISCONTINUED | OUTPATIENT
Start: 2019-01-01 | End: 2019-01-01 | Stop reason: HOSPADM

## 2019-01-01 RX ORDER — SODIUM CHLORIDE 9 MG/ML
INJECTION, SOLUTION INTRAVENOUS CONTINUOUS PRN
Status: DISCONTINUED | OUTPATIENT
Start: 2019-01-01 | End: 2019-01-01 | Stop reason: SDUPTHER

## 2019-01-01 RX ORDER — FENTANYL CITRATE 50 UG/ML
25 INJECTION, SOLUTION INTRAMUSCULAR; INTRAVENOUS EVERY 5 MIN PRN
Status: DISCONTINUED | OUTPATIENT
Start: 2019-01-01 | End: 2019-01-01 | Stop reason: HOSPADM

## 2019-01-01 RX ORDER — DEXAMETHASONE SODIUM PHOSPHATE 4 MG/ML
INJECTION, SOLUTION INTRA-ARTICULAR; INTRALESIONAL; INTRAMUSCULAR; INTRAVENOUS; SOFT TISSUE PRN
Status: DISCONTINUED | OUTPATIENT
Start: 2019-01-01 | End: 2019-01-01 | Stop reason: SDUPTHER

## 2019-01-01 RX ORDER — MORPHINE SULFATE 2 MG/ML
2 INJECTION, SOLUTION INTRAMUSCULAR; INTRAVENOUS EVERY 5 MIN PRN
Status: DISCONTINUED | OUTPATIENT
Start: 2019-01-01 | End: 2019-01-01 | Stop reason: HOSPADM

## 2019-01-01 RX ORDER — ONDANSETRON 2 MG/ML
INJECTION INTRAMUSCULAR; INTRAVENOUS PRN
Status: DISCONTINUED | OUTPATIENT
Start: 2019-01-01 | End: 2019-01-01 | Stop reason: SDUPTHER

## 2019-01-01 RX ADMIN — PROPOFOL 175 MG: 10 INJECTION, EMULSION INTRAVENOUS at 08:22

## 2019-01-01 RX ADMIN — DEXAMETHASONE SODIUM PHOSPHATE 6 MG: 4 INJECTION, SOLUTION INTRAMUSCULAR; INTRAVENOUS at 08:27

## 2019-01-01 RX ADMIN — FAMOTIDINE 20 MG: 10 INJECTION, SOLUTION INTRAVENOUS at 07:35

## 2019-01-01 RX ADMIN — GLYCOPYRROLATE 0.2 MG: 0.2 INJECTION, SOLUTION INTRAMUSCULAR; INTRAVENOUS at 08:35

## 2019-01-01 RX ADMIN — ONDANSETRON 4 MG: 2 INJECTION INTRAMUSCULAR; INTRAVENOUS at 08:27

## 2019-01-01 RX ADMIN — SODIUM CHLORIDE: 9 INJECTION, SOLUTION INTRAVENOUS at 08:20

## 2019-01-01 RX ADMIN — GLYCOPYRROLATE 0.1 MG: 0.2 INJECTION, SOLUTION INTRAMUSCULAR; INTRAVENOUS at 08:20

## 2019-01-01 RX ADMIN — LIDOCAINE HYDROCHLORIDE 4 ML: 20 INJECTION, SOLUTION EPIDURAL; INFILTRATION; INTRACAUDAL; PERINEURAL at 08:22

## 2019-01-01 RX ADMIN — FENTANYL CITRATE 50 MCG: 50 INJECTION INTRAMUSCULAR; INTRAVENOUS at 08:20

## 2019-01-01 RX ADMIN — FENTANYL CITRATE 50 MCG: 50 INJECTION INTRAMUSCULAR; INTRAVENOUS at 08:22

## 2019-01-01 RX ADMIN — SODIUM CHLORIDE: 9 INJECTION, SOLUTION INTRAVENOUS at 07:35

## 2019-01-01 ASSESSMENT — PULMONARY FUNCTION TESTS
PIF_VALUE: 3
PIF_VALUE: 12
PIF_VALUE: 0
PIF_VALUE: 10
PIF_VALUE: 4
PIF_VALUE: 1
PIF_VALUE: 11
PIF_VALUE: 14
PIF_VALUE: 2
PIF_VALUE: 3
PIF_VALUE: 3
PIF_VALUE: 5
PIF_VALUE: 3
PIF_VALUE: 10
PIF_VALUE: 14
PIF_VALUE: 4
PIF_VALUE: 5
PIF_VALUE: 11
PIF_VALUE: 10
PIF_VALUE: 12
PIF_VALUE: 14
PIF_VALUE: 1
PIF_VALUE: 4
PIF_VALUE: 4
PIF_VALUE: 3
PIF_VALUE: 0
PIF_VALUE: 13
PIF_VALUE: 0
PIF_VALUE: 10
PIF_VALUE: 2
PIF_VALUE: 14

## 2019-01-01 ASSESSMENT — LIFESTYLE VARIABLES: SMOKING_STATUS: 0

## 2019-01-01 ASSESSMENT — PAIN SCALES - GENERAL
PAINLEVEL_OUTOF10: 0

## 2019-01-01 ASSESSMENT — ENCOUNTER SYMPTOMS: SHORTNESS OF BREATH: 0

## 2019-01-01 ASSESSMENT — PAIN - FUNCTIONAL ASSESSMENT: PAIN_FUNCTIONAL_ASSESSMENT: 0-10

## 2019-01-10 ENCOUNTER — OFFICE VISIT (OUTPATIENT)
Dept: ORTHOPEDIC SURGERY | Age: 69
End: 2019-01-10
Payer: COMMERCIAL

## 2019-01-10 DIAGNOSIS — M17.11 PRIMARY OSTEOARTHRITIS OF RIGHT KNEE: Primary | ICD-10-CM

## 2019-01-10 PROCEDURE — G8484 FLU IMMUNIZE NO ADMIN: HCPCS | Performed by: PHYSICIAN ASSISTANT

## 2019-01-10 PROCEDURE — 1090F PRES/ABSN URINE INCON ASSESS: CPT | Performed by: PHYSICIAN ASSISTANT

## 2019-01-10 PROCEDURE — G8427 DOCREV CUR MEDS BY ELIG CLIN: HCPCS | Performed by: PHYSICIAN ASSISTANT

## 2019-01-10 PROCEDURE — 20610 DRAIN/INJ JOINT/BURSA W/O US: CPT | Performed by: PHYSICIAN ASSISTANT

## 2019-01-10 PROCEDURE — 1036F TOBACCO NON-USER: CPT | Performed by: PHYSICIAN ASSISTANT

## 2019-01-10 PROCEDURE — 99213 OFFICE O/P EST LOW 20 MIN: CPT | Performed by: PHYSICIAN ASSISTANT

## 2019-01-10 PROCEDURE — 1101F PT FALLS ASSESS-DOCD LE1/YR: CPT | Performed by: PHYSICIAN ASSISTANT

## 2019-01-10 PROCEDURE — 4040F PNEUMOC VAC/ADMIN/RCVD: CPT | Performed by: PHYSICIAN ASSISTANT

## 2019-01-10 PROCEDURE — G8417 CALC BMI ABV UP PARAM F/U: HCPCS | Performed by: PHYSICIAN ASSISTANT

## 2019-01-10 PROCEDURE — G8400 PT W/DXA NO RESULTS DOC: HCPCS | Performed by: PHYSICIAN ASSISTANT

## 2019-01-10 PROCEDURE — G8599 NO ASA/ANTIPLAT THER USE RNG: HCPCS | Performed by: PHYSICIAN ASSISTANT

## 2019-01-10 PROCEDURE — 1123F ACP DISCUSS/DSCN MKR DOCD: CPT | Performed by: PHYSICIAN ASSISTANT

## 2019-01-10 PROCEDURE — 3017F COLORECTAL CA SCREEN DOC REV: CPT | Performed by: PHYSICIAN ASSISTANT

## 2019-01-22 VITALS
SYSTOLIC BLOOD PRESSURE: 140 MMHG | RESPIRATION RATE: 16 BRPM | BODY MASS INDEX: 30.78 KG/M2 | HEART RATE: 70 BPM | HEIGHT: 70 IN | TEMPERATURE: 96.9 F | WEIGHT: 215 LBS | DIASTOLIC BLOOD PRESSURE: 83 MMHG

## 2019-02-13 ENCOUNTER — HOSPITAL ENCOUNTER (EMERGENCY)
Age: 69
Discharge: HOME | End: 2019-02-13
Payer: MEDICARE

## 2019-02-13 VITALS
HEART RATE: 68 BPM | BODY MASS INDEX: 31.48 KG/M2 | WEIGHT: 212.52 LBS | HEIGHT: 69 IN | OXYGEN SATURATION: 99 % | SYSTOLIC BLOOD PRESSURE: 148 MMHG | RESPIRATION RATE: 18 BRPM | TEMPERATURE: 98.2 F | DIASTOLIC BLOOD PRESSURE: 73 MMHG

## 2019-02-13 DIAGNOSIS — G56.03 BILATERAL CARPAL TUNNEL SYNDROME: Primary | ICD-10-CM

## 2019-02-13 PROCEDURE — 99283 EMERGENCY DEPT VISIT LOW MDM: CPT

## 2019-02-13 PROCEDURE — 6360000002 HC RX W HCPCS: Performed by: NURSE PRACTITIONER

## 2019-02-13 PROCEDURE — 96372 THER/PROPH/DIAG INJ SC/IM: CPT

## 2019-02-13 RX ORDER — KETOROLAC TROMETHAMINE 30 MG/ML
30 INJECTION, SOLUTION INTRAMUSCULAR; INTRAVENOUS ONCE
Status: COMPLETED | OUTPATIENT
Start: 2019-02-13 | End: 2019-02-13

## 2019-02-13 RX ADMIN — KETOROLAC TROMETHAMINE 30 MG: 30 INJECTION, SOLUTION INTRAMUSCULAR at 20:27

## 2019-02-13 ASSESSMENT — PAIN DESCRIPTION - PAIN TYPE
TYPE: ACUTE PAIN
TYPE: ACUTE PAIN

## 2019-02-13 ASSESSMENT — ENCOUNTER SYMPTOMS: COLOR CHANGE: 0

## 2019-02-13 ASSESSMENT — PAIN SCALES - GENERAL
PAINLEVEL_OUTOF10: 10
PAINLEVEL_OUTOF10: 10

## 2019-02-13 ASSESSMENT — PAIN DESCRIPTION - ORIENTATION
ORIENTATION: RIGHT
ORIENTATION: RIGHT

## 2019-02-13 ASSESSMENT — PAIN DESCRIPTION - LOCATION
LOCATION: HAND
LOCATION: HAND

## 2019-03-11 ENCOUNTER — TELEPHONE (OUTPATIENT)
Dept: CARDIOLOGY CLINIC | Age: 69
End: 2019-03-11

## 2019-03-11 ENCOUNTER — OFFICE VISIT (OUTPATIENT)
Dept: ORTHOPEDIC SURGERY | Age: 69
End: 2019-03-11
Payer: MEDICARE

## 2019-03-11 VITALS
HEIGHT: 69 IN | HEART RATE: 63 BPM | BODY MASS INDEX: 31.4 KG/M2 | SYSTOLIC BLOOD PRESSURE: 128 MMHG | WEIGHT: 212 LBS | RESPIRATION RATE: 16 BRPM | DIASTOLIC BLOOD PRESSURE: 67 MMHG

## 2019-03-11 DIAGNOSIS — M65.331 TRIGGER MIDDLE FINGER OF RIGHT HAND: Primary | ICD-10-CM

## 2019-03-11 PROCEDURE — 20550 NJX 1 TENDON SHEATH/LIGAMENT: CPT | Performed by: PHYSICIAN ASSISTANT

## 2019-03-11 PROCEDURE — 99214 OFFICE O/P EST MOD 30 MIN: CPT | Performed by: PHYSICIAN ASSISTANT

## 2019-04-01 ENCOUNTER — TELEPHONE (OUTPATIENT)
Dept: CARDIOLOGY CLINIC | Age: 69
End: 2019-04-01

## 2019-04-01 NOTE — TELEPHONE ENCOUNTER
Received a call from Pj Valdez and she was calling reporting reproducible left sided chest pain at her mastectomy incision site. She describes the pain as a \"sharp\" focal pain that will last 3-5 minutes and will resolve without any intervention. She will see Dr. Marta Nieto 4/9/19 and will need cardiac clearance. She denies any exertional symptoms. Instructed the pain is not typical cardiac pain. She states she does not feel she needs to go to the ED but instructed if pain worsens to proceed to the ED for further workup. She v/u.

## 2019-04-09 NOTE — PROGRESS NOTES
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    TONSILLECTOMY AND ADENOIDECTOMY      UMBILICAL HERNIA REPAIR      WRIST GANGLION EXCISION      right wrist       History     Social History    Marital Status:      Spouse Name: N/A     Number of Children: 2    Years of Education: N/A     Occupational History    retired       retired    disability      Social History Main Topics    Smoking status: Former Smoker -- 0.70 packs/day for 2 years     Types: Cigarettes     Quit date: 05/18/1971    Smokeless tobacco: Never Used    Alcohol Use: Yes      Comment: rare    Drug Use: No    Sexually Active: Yes -- Male partner(s)           Social History Narrative    None       Family History   Problem Relation Age of Onset    Heart Disease Mother     Diabetes Father     Prostate Cancer Father     Cirrhosis Mother     Cirrhosis Sister     Diabetes Sister        Allergies   Allergen Reactions    Lyrica (Pregabalin) Nausea Only       Lab Review:   Lab Results   Component Value Date     07/28/2018     Lab Results   Component Value Date    K 4.3 07/28/2018     Lab Results   Component Value Date    BUN 16 07/28/2018    CREATININE 0.9 07/28/2018     Lab Results   Component Value Date    LABA1C 8.7 07/27/2018       Lab Results   Component Value Date    TRIG 97 03/06/2015    HDL 58 03/06/2015    LDLCALC 111 03/06/2015    LABVLDL 19 03/06/2015       Review of Systems   Constitutional: Positive for fatigue. HENT: Negative. Eyes: Negative. Respiratory: positive for shortness of breath. Negative for wheezing. Cardiovascular: Negative for palpitations. Gastrointestinal: Negative. Endocrine: Negative. Genitourinary: Negative. Musculoskeletal: Negative. Positive for edema bilateral lower extremities   Skin: Negative. Neurological: Negative. Psychiatric/Behavioral: Negative.         Objective:     Vitals:    04/09/19 1221   BP: 108/74   Site: Right Upper Arm Position: Sitting   Pulse: 57   SpO2: 98%   Weight: 217 lb (98.4 kg)   Height: 5' 9\" (1.753 m)       Physical Exam   Constitutional: She is oriented to person, place, and time. She appears well-developed and well-nourished. HENT:   Head: Normocephalic and atraumatic. Right Ear: External ear normal.   Left Ear: External ear normal.   Nose: Nose normal.   Mouth/Throat: Oropharynx is clear and moist. No oropharyngeal exudate. Eyes: Conjunctivae and EOM are normal. Pupils are equal, round, and reactive to light. Right eye exhibits no discharge. Left eye exhibits no discharge. No scleral icterus. Neck: Normal range of motion. Neck supple. No JVD present. No tracheal deviation present. No thyromegaly present. Cardiovascular: Normal rate, regular rhythm, normal heart sounds, intact distal pulses and normal pulses. Exam reveals no gallop and no friction rub. No murmur heard. Pulmonary/Chest: Effort normal and breath sounds normal. No respiratory distress. She has no wheezes. She has no rales. She exhibits no tenderness. Abdominal: Soft. Bowel sounds are normal. She exhibits no distension and no mass. There is no tenderness. There is no rebound and no guarding. Musculoskeletal: Normal range of motion. She exhibits no edema and no tenderness. Neurological: She is alert and oriented to person, place, and time. She has normal reflexes. No cranial nerve deficit. Coordination normal.   Skin: Skin is warm and dry. Psychiatric: She has a normal mood and affect.  Her behavior is normal.     Current Outpatient Medications   Medication Sig Dispense Refill    esomeprazole (NEXIUM) 40 MG delayed release capsule Take 1 capsule by mouth 2 times daily 60 capsule 3    vitamin B-12 (CYANOCOBALAMIN) 500 MCG tablet Take 500 mcg by mouth daily      vitamin D (CHOLECALCIFEROL) 1000 UNIT TABS tablet Take 1,000 Units by mouth daily      Multiple Vitamins-Minerals (THERAPEUTIC MULTIVITAMIN-MINERALS) tablet Take 1 tablet by mouth daily      cephALEXin (KEFLEX) 500 MG capsule Take 2 capsules by mouth one hour before and 2 capsules one hour after dental procedures. This is for 3 visits 12 capsule 0    insulin lispro (HUMALOG) 100 UNIT/ML injection vial Inject 18 Units into the skin 3 times daily (before meals) Will adjust based on BS      Ascorbic Acid (VITAMIN C) 1000 MG tablet Take by mouth      anastrozole (ARIMIDEX) 1 MG tablet Take 1 mg by mouth daily      metFORMIN (GLUCOPHAGE) 500 MG tablet Take 1,000 mg by mouth 2 times daily (with meals) Pt takes 2-4 tabs daily depending on BS      furosemide (LASIX) 40 MG tablet TAKE 1 TABLET BY MOUTH ONCE DAILY AS DIRECTED 60 tablet 3    lubiprostone (AMITIZA) 8 MCG CAPS capsule TAKE ONE (1) CAPSULE BY MOUTH TWO (2) TIMES DAILY WITH MEALS (Patient taking differently: Take 2 tabs in am and will repeat in evening if needed for IBS) 60 capsule 3    EASY TOUCH INSULIN SYRINGE 30G X 5/16\" 1 ML MISC       Blood Glucose Calibration (PRODIGY CONTROL SOLUTION) LOW SOLN       Blood Glucose Monitoring Suppl (PRODIGY AUTOCODE BLOOD GLUCOSE) W/DEVICE KIT       Blood Glucose Monitoring Suppl ROSEANNE 1 each by Does not apply route three times daily Ok to dispense whichever brand is covered by insurance 1 Device 0    Glucose Blood (BLOOD GLUCOSE TEST STRIPS) STRP Test three times daily. Ok to dispense whichever brand is covered by insurance. 100 strip 3    Lancets MISC 1 each by Does not apply route 3 times daily Ok to dispense whichever brand is covered by insurance 100 each 3    Blood Glucose Calibration (PRODIGY CONTROL SOLUTION) HIGH SOLN 1 each by In Vitro route three times daily Test before using machine. Ok to dispense which ever brand is covered by insurance. 1 each 0    Insulin Pen Needle (PEN NEEDLES 31GX5/16\") 31G X 8 MM MISC 1 each by Does not apply route daily. 100 each 12    insulin glargine (LANTUS) 100 UNIT/ML injection vial Inject 50 Units into the skin daily.  (Patient taking differently: Inject 52 Units into the skin daily Takes in AM) 1 vial 10    rosuvastatin (CRESTOR) 10 MG tablet Take 1 tablet by mouth daily. 30 tablet 3    albuterol (PROVENTIL) (2.5 MG/3ML) 0.083% nebulizer solution Take 2.5 mg by nebulization every 6 hours as needed for Shortness of Breath. No current facility-administered medications for this visit. EKG Interpretation: 9/11/17 SR     Image Review:     ECHO:2/10/2015  Normal LV size and systolic function. Estimated ejection fraction is 65%. A bubble study was performed and fails to show evidence of shunting. Trivial Mitral and Tricuspid regurgitation. Pulmonary artery Systolic pressure is 30 mm Hg which is slightly above normal    Carotid Duplex:2/10/2015     Right Impression   The right internal carotid artery appears to have a 0-15% diameter reducing   stenosis based on velocity criteria. The right vertebral artery demonstrates normal antegrade flow. Left Impression   The left internal carotid artery appears to have a 0-15% diameter reducing   stenosis based on velocity criteria. The left vertebral artery demonstrates normal antegrade flow. CTA Pulmonary w contrast:3/19/2015  IMPRESSION:   1. No acute abnormality. Venous duplex:4/2/2015  Right Impression   No evidence of deep vein or superficial vein thrombosis involving the right   lower extremity. Left Impression   No evidence of deep vein or superficial vein thrombosis involving the left   lower extremity. Stress echo 7/17/17  Normal dobutamine stress ECHO. No ischemic ECG changes with pharmacologic stress.     Stress perfusion 7/27/18   1. Technically a satisfactory study.    2. Normal pharmacological stress portion of the study.    3. No evidence of Ischemia by Myocardial Perfusion Imaging.    4. Gated Study shows normal LV size and Systolic function; EF is 77%.           Assessment:       -Preoperative cardiac assessment  Low risk procedures     -Chest pain atypical   No indication for ACS  Previous C revealed nonobstructive CAD    Lower extremity edema -chronic     Non Obstructive CAD and    LV diastolic Dysfunction    Sarcoidosis: Treatment as per Pulmonary      Hyperlipidemia: Continue risk factor modification     DM: Continue blood sugar control         Plan:       Continue current risk modifications    Reviewed all medication and continue,     Continue Crestor. Will continue to monitor closely and instructed to call with worsening symptoms. On lasix and tolerating well per patient     She will need to return in follow 6 months    Thank you for letting me participate in this patient's care and please do not hesitate to call me with any questions or concerns. Willard Norris MD, MPH, [facility]    This note was scribed in the presence of Willard Norris MD by [employer], RN. Physician Attestation:  The scribes documentation has been prepared under my direction and personally reviewed by me in its entirety. I confirm that the note above accurately reflects all work, treatment, procedures, and medical decision making performed by me.

## 2019-05-03 NOTE — PROGRESS NOTES
Phone message left to call PAT dept at [phone]  for history review and surgery instructions on 5/3/19 @ [phone]

## 2019-05-06 NOTE — PROGRESS NOTES
body piercing's on the day of surgery. All jewelry must be removed. If you have dentures, they will be removed before going to operating room. For your convenience, we will provide you with a container. If you wear contact lenses or glasses, they will be removed, please bring a case for them. If you have a living will and a durable power of  for healthcare, please bring in a copy. As part of our patient safety program to minimize surgical site infections, we ask you to do the following:    · Please notify your surgeon if you develop any illness between         now and the  day of your surgery. · This includes a cough, cold, fever, sore throat, nausea,         or vomiting, and diarrhea, etc.  ·  Please notify your surgeon if you experience dizziness, shortness         of breath or blurred vision between now and the time of your surgery. Do not shave your operative site 96 hours prior to surgery. For face and neck surgery, men may use an electric razor 48 hours   prior to surgery. You may shower the night before surgery or the morning of   your surgery with an antibacterial soap. You will need to bring a photo ID and insurance card    [facility] has an onsite pharmacy, would you like to utilize our pharmacy     If you will be staying overnight and use a C-pap machine, please bring   your C-pap to hospital     Our goal is to provide you with excellent care, therefore, visitors will be limited to two(2) in the room at a time so that we may focus on providing this care for you. Please contact pre-admission testing if you have any further questions. [facility] phone number:  [phone] [facility] fax number:  [phone]  Please note these are generalized instructions for all surgical cases, you may be provided with more specific instructions according to your surgery.   [address]  time____________ Surgery time____________    Take the following medications with a sip of water:    Do not eat or drink anything after 12:00 midnight prior to your surgery. This includes water chewing gum, mints and ice chips. You may brush your teeth and gargle the morning of your surgery, but do not swallow the water     Please see your family doctor/pediatrician for a history and physical and/or concerning medications. Bring any test results/reports from your physicians office. If you are under the care of a heart doctor or specialist doctor, please be aware that you may be asked to them for clearance    You may be asked to stop blood thinners such as Coumadin, Plavix, Fragmin, Lovenox, etc., or any anti-inflammatories such as:  Aspirin, Ibuprofen, Advil, Naproxen prior to your surgery. We also ask that you stop any OTC medications such as fish oil, vitamin E, glucosamine, garlic, Multivitamins, COQ 10, etc.    We ask that you do not smoke 24 hours prior to surgery  We ask that you do not  drink any alcoholic beverages 24 hours prior to surgery     You must make arrangements for a responsible adult to take you home after your surgery. For your safety you will not be allowed to leave alone or drive yourself home. Your surgery will be cancelled if you do not have a ride home. Also for your safety, it is strongly suggested that someone stay with you the first 24 hours after your surgery. A parent or legal guardian must accompany a child scheduled for surgery and plan to stay at the hospital until the child is discharged. Please do not bring other children with you. For your comfort, please wear simple loose fitting clothing to the hospital.  Please do not bring valuables. Do not wear any make-up or nail polish on your fingers or toes      For your safety, please do not wear any jewelry or body piercing's on the day of surgery. All jewelry must be removed.       If you have dentures, they will be removed before going to operating room. For your convenience, we will provide you with a container. If you wear contact lenses or glasses, they will be removed, please bring a case for them. If you have a living will and a durable power of  for healthcare, please bring in a copy. As part of our patient safety program to minimize surgical site infections, we ask you to do the following:    · Please notify your surgeon if you develop any illness between         now and the  day of your surgery. · This includes a cough, cold, fever, sore throat, nausea,         or vomiting, and diarrhea, etc.  ·  Please notify your surgeon if you experience dizziness, shortness         of breath or blurred vision between now and the time of your surgery. Do not shave your operative site 96 hours prior to surgery. For face and neck surgery, men may use an electric razor 48 hours   prior to surgery. You may shower the night before surgery or the morning of   your surgery with an antibacterial soap. You will need to bring a photo ID and insurance card    [facility] has an onsite pharmacy, would you like to utilize our pharmacy     If you will be staying overnight and use a C-pap machine, please bring   your C-pap to hospital     Our goal is to provide you with excellent care, therefore, visitors will be limited to two(2) in the room at a time so that we may focus on providing this care for you. Please contact pre-admission testing if you have any further questions. [facility] phone number:  [phone] PAT fax number:  [phone]  Please note these are generalized instructions for all surgical cases, you may be provided with more specific instructions according to your surgery.

## 2019-05-06 NOTE — PROGRESS NOTES
This dictation was done with Dragon dictation and may contain mechanical errors related to translation. Blood pressure 117/62, pulse 61, temperature 96.4 °F (35.8 °C), resp. rate 16, height 5' 9\" (1.753 m), weight 217 lb (98.4 kg), last menstrual period 05/18/1975, not currently breastfeeding.       Kenalog:  NDC: [id number]  Lot Number: [id number]  Expiration Date: 4/20

## 2019-05-06 NOTE — PROGRESS NOTES
Pt states she will come here in a cab and then her brother knows to be here to  her after the procedure. She will give us her brother's  number when she checks in tomorrow for procedure. Pt states she has a service dog, that her brother Gilda Farley) will bring in after procedure. She was told to bring the papers in provided information that dog is a licensed service dog. I called Jenn to notify her about this. She confirmed pt must have necessary paper work, pt states she understands.

## 2019-05-07 NOTE — ANESTHESIA POSTPROCEDURE EVALUATION
Department of Anesthesiology  Postprocedure Note    Patient: Bonnie Benitez  MRN: [id number]  YOB: 1950  Date of evaluation: 5/7/2019  Time:  2:00 PM     Procedure Summary     Date:  05/07/19 Room / Location:  [facility] OR 03 / [facility] OR    Anesthesia Start:  0820 Anesthesia Stop:  6112    Procedure:  RIGHT ULNAR NERVE DECOMPRESSION AT ELBOW AND RIGHT CARPAL TUNNEL RELEASE, AND RIGHT MIDDLE FINGER TRIGGER FINGER RELEASE (Right ) Diagnosis:       Carpal tunnel syndrome, right      (RIGHT CUBITAL TUNNEL SYNDROME/ ILNAR NERVE ENTRAPMENT AT ELBOW, RIGHT CARPAL TUNNEL SYNDROME AND RIGHT MIDDLE FINGER TRIGGER FINGER)    Surgeon:  Anyi Kwon MD Responsible Provider:  Jose Carr MD    Anesthesia Type:  general ASA Status:  3          Anesthesia Type: general    Yusuf Phase I: Yusuf Score: 10    Yusuf Phase II: Yusuf Score: 10    Last vitals: Reviewed and per EMR flowsheets.        Anesthesia Post Evaluation    Patient location during evaluation: PACU  Patient participation: complete - patient participated  Level of consciousness: awake and alert  Pain score: 0  Airway patency: patent  Nausea & Vomiting: no nausea and no vomiting  Complications: no  Cardiovascular status: blood pressure returned to baseline  Respiratory status: acceptable  Hydration status: euvolemic

## 2019-05-07 NOTE — OP NOTE
Hand  OPERATIVE REPORT              . Patient:  John Cardona    YOB: 1950  Date of Service:  5/7/2019  Location:  [facility]      Preoperative Diagnoses:  Right  carpal tunnel syndrome & Right cubital tunnel syndrome & right Middle Finger stenosing tenosynovitis     Postoperative Diagnoses:  Same    Procedures:   Right  Carpal Tunnel Release & Right Ulnar Nerve decompression at the Cubital Tunnel & right Middle Finger trigger finger release     Surgeon:    Laura Reyna MD    Surgical Assistant:    AARON Agosto Assistant    Anesthesia:   General                                   Blood Loss:    Minimal         Complications:    None                          Tourniquet Time:   8 minutes      Indications: Ms. John Cardona is a 68y.o.  year old female with Right  carpal tunnel syndrome & Right cubital tunnel syndrome & right Middle Finger stenosing tenosynovitis . I have discussed preoperatively with her  the complications, limitations, expectations, alternatives and risk of the planned surgical care which she understood & all of her  questions were answered. Ms. John Cardona has provided written informed consent to proceed. After written consent was obtained and the proper operative sites were identified and marked, Ms. John Cardona was brought to the operating room and placed in the supine position on the operating room table with the Right arm extended upon a hand table. General anesthesia was induced & the Right upper extremity was prepped and draped in the usual sterile fashion. Procedure:   After Esmarch exsanguination, the pneuomo-tourniquet was inflated to 250 millimeters of Mercury about the arm. Attention was turned to the medial elbow. A curvilinear incision was fashioned over the posterior medial aspect of the elbow centered between the medial epicondyle and the tip of the olecranon.  Dissection was carried carefully through the subcutaneous tissue identifying and protecting the superficial neurovascular structures. The cubital tunnel was identified and cleared of overlying soft tissue. Careful incision allowed exposure of the ulnar nerve. The nerve was traced proximally and circumferential control of the nerve was obtained. It was dissected proximally to the level of the connection between the biceps and triceps muscles, approximately 3 cm proximal to the medial epicondyle. It was carefully mobilized from the medial intermuscular septum. It was traced distally, being completely freed along the course of the cubital tunnel, and was dissected distally to the level of the second motor branch as it split the heads of the FCU muscle. There was a tight fibrous band at the confluence of the heads of the FCU which was carefully divided. Digital palpation revealed that there were no further proximal or distal constriction about the nerve. The Ulnar Nerve was found to be stable in it's groove without tendency toward subluxation or snapping. Attention was turned to the palm. A 2 cm longitudinal incision was fashioned at the base of the palm, paralleling the longitudinal thenar crease. Dissection was carried carefully through the subcutaneous tissue identifying and protecting the neurovascular structures. The palmar fascia was incised longitudinally, exposing the transverse carpal ligament. The transverse carpal ligament was incised from its proximal to distal most extent, under direct visualization. The terminal 2 cm of antebrachial fascia was similarly incised under direct visualization. The contents of the carpal tunnel were inspected and found to be free of mass, lesion or other abnormality. Digital palpation revealed no further constriction about the median nerve. Attention was turned to the digits. A 1 centimeter oblique incision was fashioned over the base of the flexor tendon sheath of the Right Middle Finger. Dissection was carried carefully through the subcutaneous tissues, taking great care to identify and protect the neurovascular structures. The flexor tendon sheath was carefully identified and cleared of surrounding soft tissue. The A1 pulley was identified and incised longitudinally along its entire length under direct visualization. The flexor tendons were gently withdrawn from the sheath and inspected. They were found to be in good condition. The tendons were returned to their appropriate location and the finger was placed through a full range of motion. There was no evidence of residual stenosis or triggering. The incisions were all irrigated copiously with sterile saline for irrigation. The pneumo-tourniquet was deflated after a period of 8 minutes elevation & the fingers were immediately pink and well perfused. Hemostasis was easily obtained with direct pressure and electrocautery. The incisions were closed in layers with interrupted sutures. The wounds were dressed with Adaptic & dry sterile dressings after local anesthetic was instilled for postoperative analgesia. Ms. Morales Phillips was awakened from anesthesia having tolerated the procedure without apparent complication. She was returned to the recovery room in stable condition. At the conclusion of the procedure, all needle, instrument and sponge counts were correct. Deacon Hogan MD   5/7/2019, 8:54 AM
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
## 2019-05-07 NOTE — PROGRESS NOTES
Admit for hand surgery,, arrived with walker, steady on feet, patient very talkative through out morning. States has service dog at home.   Brother will pick her up and help her at home

## 2019-05-08 NOTE — TELEPHONE ENCOUNTER
Patient called states that she is coughing a lot post surgery. She states that she took cough medicine last night that helped. Coughing started back up this morning and she coughed up something pink.  She is wondering if this is normal?    Please call patient to discuss:  [phone]

## 2019-05-13 NOTE — Clinical Note
Dear  Deborah Corey MD,Thank you very much for your referral or Ms. Bonnie Benitez to me for evaluation and treatment of her Hand & Wrist condition. I appreciate your confidence in me and thank you for allowing me the opportunity to care for your patients. If I can be of any further assistance to you on this or any other patient, please do not hesitate to contact me. Sincerely,Vance Salguero MD

## 2019-05-13 NOTE — PROGRESS NOTES
Ms. Jaky Garber returns today in follow-up of her recent right Middle Finger trigger finger release, Ulnar Nerve Decompression (Cubital Tunnel Release) & Carpal Tunnel Release done approximately 1 week ago. She has done well noting moderate discomfort and no other reported complications. She notes pre-operative symptoms to be Improved at this time. Physical Exam:  Skin incision is healing well, no significant drainage, no significant erythema. Digital range of motion is limited by pain and stiffness with some accumulated edema. Wrist range of motion is limited by pain only mildy  Elbow range of motion remains somewhat limited due to discomfort. Sensation is improved from pre-op in the Median Innervated Digits and Ulnar Innervated Digits. Vascular examination reveals normal and good capillary refill. Swelling is mild. There is no clinical evidence of residual Median & Ulnar Nerve dysfuntion. No residual Middle Finger triggering    Impression:  Ms. Jaky Garber is doing fairly well after recent right Middle Finger trigger finger release,  Ulnar Nerve Decompression (Cubital Tunnel Release) &  Carpal Tunnel Release. Plan:  Ms. Jaky Garber is instructed in work on Active & Passive range of motion of the digits, wrist, & elbow. These modalities were specifically demonstrated to her today. We discussed the appropriateness of gradual resumption of use of the operated hand and the return to normal use as comfort allows. She is given instructions regarding management of the fresh surgical incision and progressive use of desensitization and tissue massage techniques. We discussed the appropriate expectations and timeline for symptom improvement. She is provided a written patient instruction sheet titled: Postoperative Instructions After Ulnar Nerve Decompression.     We discussed the option of pursuing formalized hand therapy and a prescription  was offered and declined by the

## 2019-05-15 NOTE — PROGRESS NOTES
Patient is 75-year-old female presents today for further evaluation treatment of right knee pain. She has known degenerative osteoarthritis of the knee and in August 2018 had x-rays which showed lateral bone-on-bone degenerative arthritis. She's been treated with cortisone injections anti-inflammatories and physical therapy. She now complains of pain rating it up to 7 out of 10 with ambulation and going up and down steps. She's had no previous history of injury or surgery to the right knee. Patient Active Problem List   Diagnosis    Sarcoidosis    Diabetes mellitus with hyperglycemia (HCC)    Degenerative arthritis of knee    Seizure disorder (HCC)    Diverticulosis    Headaches, cluster    Chest pain    Trigger finger    CAD (coronary artery disease)    Diastolic dysfunction, left ventricle    TIFFANY (obstructive sleep apnea)    Restrictive lung disease    Arthritis of right hip    HTN (hypertension)    Anemia    H/O total hip arthroplasty, right-7.18.2017    Arthritis of left knee    Arthritis of right knee    Hyperlipidemia    Vertigo    Ulnar neuropathy of both upper extremities    Anesthesia of skin    Degeneration of lumbar or lumbosacral intervertebral disc    Displacement of lumbar intervertebral disc without myelopathy    Lumbar radiculopathy     Prior to Visit Medications    Medication Sig Taking?  Authorizing Provider   sertraline (ZOLOFT) 100 MG tablet Take 100 mg by mouth daily  Historical Provider, MD   aspirin 81 MG tablet Take 81 mg by mouth daily  Historical Provider, MD   ipratropium-albuterol (DUONEB) 0.5-2.5 (3) MG/3ML SOLN nebulizer solution Inhale 1 vial into the lungs every 4 hours  Historical Provider, MD   rosuvastatin (CRESTOR) 10 MG tablet Take 1 tablet by mouth daily  Purnima Osorio MD   esomeprazole (NEXIUM) 40 MG delayed release capsule Take 1 capsule by mouth 2 times daily  Nitin More MD   vitamin B-12 (CYANOCOBALAMIN) 500 MCG tablet Take 500 mcg extension to 125° of flexion with no signs of instability noted. No new x-rays obtained today however x-rays in the past of shown degenerative osteoarthritis. Impression 76-year-old female with degenerative osteoarthritis of her right knee. The right knee is injected with 3 mL of Marcaine and 40 mg of Kenalog. Plan we had a 15 minute face-to-face discussion with this patient of which greater than 50% time was spent in consultation concerning further care and treatment of her painful right knee. She has degenerative osteoarthritis and possibly a rheumatoid component of her knee arthritic symptoms. she will eventually need to undergo knee arthroplasty to control her symptoms. We will continue to treat her conservatively at this time and see how the cortisone injections work. Follow-up in 3-4 months or p.r.n.

## 2019-05-16 NOTE — TELEPHONE ENCOUNTER
Pt is calling about her incision opening up and said the stitch is not going down. The elbow looks great but the hand is not closed.   She stated it is red and inflamed please

## 2019-05-16 NOTE — TELEPHONE ENCOUNTER
Lmom informing pt she will need to do soaks 2-3 times a day for a couple days.  Told no ointment or epsom salts

## 2019-05-16 NOTE — TELEPHONE ENCOUNTER
Patient states she tried the soaking and nothing is working. The incision is opening up    Please call to advise.

## 2019-05-21 NOTE — TELEPHONE ENCOUNTER
Spoke with patient. She is VERY unhappy that dissolvable stitches were used. She states that since she is a diabetic, they never should have been used for her. She states that she was told [facility] only uses dissolvable stitches. She would like it noted that she does not ever want dissolvable stitches used on her again. Patient is unhappy that when she called last week, she was told to perform the warm water soaks and nothing else was done. Advised patient that I will note in her chart that she requests no dissolvable stitches be used. She is scheduled for an incision/wound check on Friday. She states that she needs at least 48 hours to arrange transportation with her insurance company.

## 2019-05-21 NOTE — TELEPHONE ENCOUNTER
Patient called stating that her huston is not healing. Patient states,\"My hand is not healing, it is red and I have been doing my exercise as instructed. You can see my flesh! I am diabetic and I should have had regular stitches not dissolvable ones because those don't heal well for me. I have an appointment to see a womb specialist on June 7th. If he wants to speak with me about what I am doing he can call me directly. \"

## 2019-05-22 NOTE — TELEPHONE ENCOUNTER
Pt is calling back about her appt. She said she would be able to make it. Just wanted to let us know.   This is done

## 2019-05-31 NOTE — PROGRESS NOTES
Ms. Sandra Mathias returns today in follow-up of her recent right Middle Finger A1 Pulley (Trigger Finger) Release, Carpal Tunnel Release and Ulnar Nerve decompression at the Cubital Tunnel  done approximately 3 weeks ago. She has done well noting moderate discomfort and no other reported complications. She notes pre-operative symptoms to be Improved at this time. She feels that her hand is doing \"very bad\" at this time    Physical Exam:  Skin incision is healing well, no significant drainage, no dehiscence. Carpal Tunnel Release incision is granulating and reepithelializing well with less than 2mm width, There is no erythema, drainage, or sign of infection. All other incisions are fully healed and nontender. Digital range of motion is limited by pain, effort and stiffness in the Middle Finger, normal in all other digits. Wrist range of motion is equal bilateral.  Sensation is increased in the Median Innervated Digits. Vascular examination reveals normal and good capillary refill. Swelling is mild. Her preoperative triggering is Fully Resolved. Impression:  Ms. Sandra Mathias is doing fairly well after recent right Middle Finger Trigger Finger Release, Carpal Tunnel Release and Ulnar Nerve decompression at the Cubital Tunnel     Plan:  Ms. Sandra Mathias is instructed in work on Active & Passive range of motion of the digits, wrist, & elbow. These modalities were specifically demonstrated to her today. We discussed the appropriateness of gradual resumption of use of the operated hand and the return to normal use as comfort allows. She is given instructions regarding management of the fresh surgical incision and progressive use of desensitization and tissue massage techniques. We discussed the appropriate expectations and timeline for symptom improvement.     She is to continue her wound care modalities    I have asked Ms. Sandra Mathias to follow-up with me or contact me by telephone over the next 2-4 weeks if her symptoms have not fully resolved or if she has not regained full & painless return of function. She is also specifically instructed to return to the office or call for an appointment sooner if her symptoms are changing or worsening prior to that time.

## 2019-05-31 NOTE — PATIENT INSTRUCTIONS
Protocol for Managing Open Wounds  Dr. Pranav Mendoza. Tom            1. Please remove all dressings so that you may see the skin fully. 2. Prepare a clean sink full of warm water (bath temperature). Add one to two squirts of liquid antibacterial soap. Insert hand and soak for approximately five minutes, making sure to exercise your motion of fingers and wrists fully while soaking in warm water. 3. After finished soaking, pat wound dry. 4. Apply dressing as instructed in the office. A. Layer #1 - Adaptic gauze. B. Layer #2 - Light cotton gauze. Judeen Conn #3 - Tape or other covering. 5. I typically recommend soaking two to three times per day with a clean dressing change after each soak. Hand Range of Motion Instructions      Dr. Chrissy Berrios    Be cautions in resuming fulll activities and use of the hand for next 2 - 4 weeks. Perform the following exercises VIGOROUSLY at least four times a day. Exercises should be performed in the seated position with elbow on tabletop or other firm surface. If you cannot make these motions on your own, you may use other hand to assist in making these motions. Fully straighten fingers until hand is flat. Fully bend fingers until hand is in a full fist.   Bend wrist forward and backward (grasp hand around knuckles with other hand to do so). Rotate forearm so that your palm faces towards your face. Rotate forearm so that your palm faces away from your face (grasp hand around wrist with other hand to do so). Fully straighten elbow. Fully bend elbow. Continue light use of the hand progressing to more normal us as it feels comfortable to do so. In 2 - 4 weeks you may discontinue using the brace (if you were using one) and resume normal use of the hand and wrist if you have regained full and painless motion and function.   If you are unable to achieve  full and painless motion and function over 4 weeks, please call the office at [phone] to schedule a follow-up

## 2019-05-31 NOTE — Clinical Note
Dear  Austin Araya MD,Thank you very much for your referral or Ms. Leena Hardwick to me for evaluation and treatment of her Hand & Wrist condition. I appreciate your confidence in me and thank you for allowing me the opportunity to care for your patients. If I can be of any further assistance to you on this or any other patient, please do not hesitate to contact me. Sincerely,Vance Clay MD

## 2019-09-09 NOTE — PROGRESS NOTES
Patient is 64-year-old female we follow for bilateral degenerative osteoarthritis of her knees. She was last seen and underwent cortisone injections on 5/11/2019. Patient stated that this gave her some pretty significant relief but now is back complaining of right knee hurting more than left knee. This patient is status post right total hip arthroplasty performed over 2 years ago 7/18/2017. She now complains of loss of range of motion pain with ambulation and a mild deformity of both knees. She is here for further evaluation and follow-up of her knees. Patient Active Problem List   Diagnosis    Sarcoidosis    Diabetes mellitus with hyperglycemia (HCC)    Degenerative arthritis of knee    Seizure disorder (HCC)    Diverticulosis    Headaches, cluster    Chest pain    Trigger finger    CAD (coronary artery disease)    Diastolic dysfunction, left ventricle    TIFFANY (obstructive sleep apnea)    Restrictive lung disease    Arthritis of right hip    HTN (hypertension)    Anemia    H/O total hip arthroplasty, right-7.18.2017    Arthritis of left knee    Arthritis of right knee    Hyperlipidemia    Vertigo    Ulnar neuropathy of both upper extremities    Anesthesia of skin    Degeneration of lumbar or lumbosacral intervertebral disc    Displacement of lumbar intervertebral disc without myelopathy    Lumbar radiculopathy     Prior to Visit Medications    Medication Sig Taking?  Authorizing Provider   sertraline (ZOLOFT) 100 MG tablet Take 100 mg by mouth daily  Historical Provider, MD   aspirin 81 MG tablet Take 81 mg by mouth daily  Historical Provider, MD   ipratropium-albuterol (DUONEB) 0.5-2.5 (3) MG/3ML SOLN nebulizer solution Inhale 1 vial into the lungs every 4 hours  Historical Provider, MD   rosuvastatin (CRESTOR) 10 MG tablet Take 1 tablet by mouth daily  Godwin Hwang MD   esomeprazole (NEXIUM) 40 MG delayed release capsule Take 1 capsule by mouth 2 times daily  [employer],

## 2019-09-09 NOTE — LETTER
THIS SUPPLEMENTAL NOTICE SUPPLEMENTS AND DOES NOT REPLACE THE [facility] CONSENT, PATIENT RESPONSIBILITY AND NOTICE OF PRIVACY PRACTICE. Marlene Henry    1950        Patients Signature: ____________________________________________________         DATE: ____/____/___      Delta Timbo / Support Persons Signature (if applicable) _________________________     DATE: __/__/__    **Each person will be asked to sign this commitment before each Shared Medical Appointment. Thank You ! SURGERY SCHEDULING TIMES                                                                                                                                                      Marlene Henry                                                                                       1950                                                                           SURGERY DATE: ___/___/___                                                                      SURGERY TIME:  ___:___ AM/PM                                                                      ARRIVAL TIME:   ___:___  AM/PM                                                                                                                        **PLEASE REPORT TO THE                                                       INFORMATION DESK IN THE MAIN LOBBY                                                          OF THE HOSPITAL.         [facility]                           JET INFO     PT NAME:  Marlene Henry              Patient Phone:  [phone] (home)                                           1950    PCP:  PCP:  Mara Davenport MD                APPT DATE:  ___/___/___      DATE:  19        H&P __________

## 2020-01-01 ENCOUNTER — APPOINTMENT (OUTPATIENT)
Dept: CT IMAGING | Age: 70
End: 2020-01-01
Payer: MEDICARE

## 2020-01-01 ENCOUNTER — TELEPHONE (OUTPATIENT)
Dept: CARDIOLOGY CLINIC | Age: 70
End: 2020-01-01

## 2020-01-01 ENCOUNTER — APPOINTMENT (OUTPATIENT)
Dept: CT IMAGING | Age: 70
DRG: 870 | End: 2020-01-01
Payer: MEDICARE

## 2020-01-01 ENCOUNTER — APPOINTMENT (OUTPATIENT)
Dept: GENERAL RADIOLOGY | Age: 70
DRG: 870 | End: 2020-01-01
Payer: MEDICARE

## 2020-01-01 ENCOUNTER — APPOINTMENT (OUTPATIENT)
Dept: GENERAL RADIOLOGY | Age: 70
End: 2020-01-01
Payer: MEDICARE

## 2020-01-01 ENCOUNTER — HOSPITAL ENCOUNTER (EMERGENCY)
Age: 70
Discharge: HOME | End: 2020-03-24
Attending: EMERGENCY MEDICINE
Payer: MEDICARE

## 2020-01-01 ENCOUNTER — HOSPITAL ENCOUNTER (EMERGENCY)
Age: 70
Discharge: HOME | End: 2020-03-23
Attending: EMERGENCY MEDICINE
Payer: MEDICARE

## 2020-01-01 ENCOUNTER — HOSPITAL ENCOUNTER (INPATIENT)
Age: 70
LOS: 11 days | DRG: 870 | End: 2020-04-07
Attending: STUDENT IN AN ORGANIZED HEALTH CARE EDUCATION/TRAINING PROGRAM | Admitting: HOSPITALIST
Payer: MEDICARE

## 2020-01-01 ENCOUNTER — HOSPITAL ENCOUNTER (OUTPATIENT)
Dept: VASCULAR LAB | Age: 70
Discharge: HOME | End: 2020-03-13
Payer: MEDICARE

## 2020-01-01 ENCOUNTER — HOSPITAL ENCOUNTER (EMERGENCY)
Age: 70
Discharge: HOME | End: 2020-03-13
Attending: STUDENT IN AN ORGANIZED HEALTH CARE EDUCATION/TRAINING PROGRAM
Payer: MEDICARE

## 2020-01-01 VITALS
RESPIRATION RATE: 14 BRPM | WEIGHT: 198.41 LBS | SYSTOLIC BLOOD PRESSURE: 114 MMHG | HEART RATE: 78 BPM | TEMPERATURE: 99 F | BODY MASS INDEX: 29.3 KG/M2 | OXYGEN SATURATION: 98 % | DIASTOLIC BLOOD PRESSURE: 55 MMHG

## 2020-01-01 VITALS
HEIGHT: 69 IN | DIASTOLIC BLOOD PRESSURE: 70 MMHG | BODY MASS INDEX: 28.11 KG/M2 | SYSTOLIC BLOOD PRESSURE: 138 MMHG | TEMPERATURE: 98.8 F | RESPIRATION RATE: 17 BRPM | WEIGHT: 189.82 LBS | HEART RATE: 80 BPM | OXYGEN SATURATION: 100 %

## 2020-01-01 VITALS
HEART RATE: 74 BPM | RESPIRATION RATE: 15 BRPM | TEMPERATURE: 100.1 F | SYSTOLIC BLOOD PRESSURE: 111 MMHG | DIASTOLIC BLOOD PRESSURE: 55 MMHG | OXYGEN SATURATION: 97 %

## 2020-01-01 VITALS
SYSTOLIC BLOOD PRESSURE: 79 MMHG | HEART RATE: 76 BPM | RESPIRATION RATE: 34 BRPM | WEIGHT: 235.89 LBS | OXYGEN SATURATION: 69 % | BODY MASS INDEX: 33.77 KG/M2 | TEMPERATURE: 96.3 F | HEIGHT: 70 IN | DIASTOLIC BLOOD PRESSURE: 37 MMHG

## 2020-01-01 LAB
A/G RATIO: 0.5 (ref 1.1–2.2)
A/G RATIO: 0.5 (ref 1.1–2.2)
A/G RATIO: 0.9 (ref 1.1–2.2)
A/G RATIO: 0.9 (ref 1.1–2.2)
A/G RATIO: 1 (ref 1.1–2.2)
ABO/RH: NORMAL
ACANTHOCYTES: ABNORMAL
ACANTHOCYTES: ABNORMAL
ALBUMIN SERPL-MCNC: 1.1 G/DL (ref 3.4–5)
ALBUMIN SERPL-MCNC: 1.4 G/DL (ref 3.4–5)
ALBUMIN SERPL-MCNC: 1.4 G/DL (ref 3.4–5)
ALBUMIN SERPL-MCNC: 1.8 G/DL (ref 3.4–5)
ALBUMIN SERPL-MCNC: 2 G/DL (ref 3.4–5)
ALBUMIN SERPL-MCNC: 2.9 G/DL (ref 3.4–5)
ALBUMIN SERPL-MCNC: 3.1 G/DL (ref 3.4–5)
ALBUMIN SERPL-MCNC: 3.2 G/DL (ref 3.4–5)
ALBUMIN SERPL-MCNC: 3.4 G/DL (ref 3.4–5)
ALP BLD-CCNC: 116 U/L (ref 40–129)
ALP BLD-CCNC: 126 U/L (ref 40–129)
ALP BLD-CCNC: 155 U/L (ref 40–129)
ALP BLD-CCNC: 173 U/L (ref 40–129)
ALP BLD-CCNC: 68 U/L (ref 40–129)
ALP BLD-CCNC: 71 U/L (ref 40–129)
ALP BLD-CCNC: 82 U/L (ref 40–129)
ALP BLD-CCNC: 97 U/L (ref 40–129)
ALT SERPL-CCNC: 15 U/L (ref 10–40)
ALT SERPL-CCNC: 198 U/L (ref 10–40)
ALT SERPL-CCNC: 6 U/L (ref 10–40)
ALT SERPL-CCNC: 66 U/L (ref 10–40)
ALT SERPL-CCNC: 8 U/L (ref 10–40)
ALT SERPL-CCNC: 8 U/L (ref 10–40)
ALT SERPL-CCNC: 84 U/L (ref 10–40)
ALT SERPL-CCNC: 9 U/L (ref 10–40)
ANION GAP SERPL CALCULATED.3IONS-SCNC: 10 MMOL/L (ref 3–16)
ANION GAP SERPL CALCULATED.3IONS-SCNC: 11 MMOL/L (ref 3–16)
ANION GAP SERPL CALCULATED.3IONS-SCNC: 12 MMOL/L (ref 3–16)
ANION GAP SERPL CALCULATED.3IONS-SCNC: 12 MMOL/L (ref 3–16)
ANION GAP SERPL CALCULATED.3IONS-SCNC: 13 MMOL/L (ref 3–16)
ANION GAP SERPL CALCULATED.3IONS-SCNC: 14 MMOL/L (ref 3–16)
ANION GAP SERPL CALCULATED.3IONS-SCNC: 15 MMOL/L (ref 3–16)
ANION GAP SERPL CALCULATED.3IONS-SCNC: 15 MMOL/L (ref 3–16)
ANION GAP SERPL CALCULATED.3IONS-SCNC: 17 MMOL/L (ref 3–16)
ANION GAP SERPL CALCULATED.3IONS-SCNC: 19 MMOL/L (ref 3–16)
ANION GAP SERPL CALCULATED.3IONS-SCNC: 9 MMOL/L (ref 3–16)
ANION GAP SERPL CALCULATED.3IONS-SCNC: 9 MMOL/L (ref 3–16)
ANISOCYTOSIS: ABNORMAL
ANISOCYTOSIS: ABNORMAL
ANTIBODY SCREEN: NORMAL
AST SERPL-CCNC: 14 U/L (ref 15–37)
AST SERPL-CCNC: 21 U/L (ref 15–37)
AST SERPL-CCNC: 22 U/L (ref 15–37)
AST SERPL-CCNC: 22 U/L (ref 15–37)
AST SERPL-CCNC: 349 U/L (ref 15–37)
AST SERPL-CCNC: 39 U/L (ref 15–37)
AST SERPL-CCNC: 408 U/L (ref 15–37)
AST SERPL-CCNC: 972 U/L (ref 15–37)
BANDED NEUTROPHILS RELATIVE PERCENT: 15 % (ref 0–7)
BANDED NEUTROPHILS RELATIVE PERCENT: 3 % (ref 0–7)
BANDED NEUTROPHILS RELATIVE PERCENT: 9 % (ref 0–7)
BASE EXCESS ARTERIAL: -1.2 MMOL/L (ref -3–3)
BASE EXCESS ARTERIAL: -1.4 MMOL/L (ref -3–3)
BASE EXCESS ARTERIAL: -1.4 MMOL/L (ref -3–3)
BASE EXCESS ARTERIAL: -11.8 MMOL/L (ref -3–3)
BASE EXCESS ARTERIAL: -12.4 MMOL/L (ref -3–3)
BASE EXCESS ARTERIAL: -15.4 MMOL/L (ref -3–3)
BASE EXCESS ARTERIAL: -2.4 MMOL/L (ref -3–3)
BASE EXCESS ARTERIAL: -2.7 MMOL/L (ref -3–3)
BASE EXCESS ARTERIAL: -3.5 MMOL/L (ref -3–3)
BASE EXCESS ARTERIAL: -4.3 MMOL/L (ref -3–3)
BASE EXCESS ARTERIAL: -4.3 MMOL/L (ref -3–3)
BASE EXCESS ARTERIAL: -5.5 MMOL/L (ref -3–3)
BASE EXCESS ARTERIAL: -5.8 MMOL/L (ref -3–3)
BASE EXCESS ARTERIAL: -6.3 MMOL/L (ref -3–3)
BASE EXCESS ARTERIAL: -6.5 MMOL/L (ref -3–3)
BASE EXCESS ARTERIAL: -6.5 MMOL/L (ref -3–3)
BASE EXCESS ARTERIAL: -6.6 MMOL/L (ref -3–3)
BASE EXCESS ARTERIAL: -7 MMOL/L (ref -3–3)
BASE EXCESS ARTERIAL: -7.5 MMOL/L (ref -3–3)
BASE EXCESS ARTERIAL: -7.6 MMOL/L (ref -3–3)
BASE EXCESS ARTERIAL: -8.4 MMOL/L (ref -3–3)
BASOPHILS ABSOLUTE: 0 K/UL (ref 0–0.2)
BASOPHILS ABSOLUTE: 0.1 K/UL (ref 0–0.2)
BASOPHILS RELATIVE PERCENT: 0 %
BASOPHILS RELATIVE PERCENT: 0.1 %
BASOPHILS RELATIVE PERCENT: 0.2 %
BASOPHILS RELATIVE PERCENT: 0.3 %
BASOPHILS RELATIVE PERCENT: 0.4 %
BASOPHILS RELATIVE PERCENT: 0.5 %
BASOPHILS RELATIVE PERCENT: 0.6 %
BILIRUB SERPL-MCNC: 0.3 MG/DL (ref 0–1)
BILIRUB SERPL-MCNC: 0.4 MG/DL (ref 0–1)
BILIRUB SERPL-MCNC: 0.5 MG/DL (ref 0–1)
BILIRUB SERPL-MCNC: 0.7 MG/DL (ref 0–1)
BILIRUB SERPL-MCNC: 0.7 MG/DL (ref 0–1)
BILIRUB SERPL-MCNC: 0.9 MG/DL (ref 0–1)
BILIRUBIN DIRECT: 0.3 MG/DL (ref 0–0.3)
BILIRUBIN DIRECT: 0.7 MG/DL (ref 0–0.3)
BILIRUBIN DIRECT: <0.2 MG/DL (ref 0–0.3)
BILIRUBIN URINE: ABNORMAL
BILIRUBIN URINE: NEGATIVE
BILIRUBIN URINE: NEGATIVE
BILIRUBIN, INDIRECT: 0 MG/DL (ref 0–1)
BILIRUBIN, INDIRECT: 0.2 MG/DL (ref 0–1)
BILIRUBIN, INDIRECT: ABNORMAL MG/DL (ref 0–1)
BLOOD BANK DISPENSE STATUS: NORMAL
BLOOD BANK PRODUCT CODE: NORMAL
BLOOD CULTURE, ROUTINE: NORMAL
BLOOD CULTURE, ROUTINE: NORMAL
BLOOD, URINE: NEGATIVE
BPU ID: NORMAL
BUN BLDV-MCNC: 10 MG/DL (ref 7–20)
BUN BLDV-MCNC: 10 MG/DL (ref 7–20)
BUN BLDV-MCNC: 11 MG/DL (ref 7–20)
BUN BLDV-MCNC: 13 MG/DL (ref 7–20)
BUN BLDV-MCNC: 13 MG/DL (ref 7–20)
BUN BLDV-MCNC: 16 MG/DL (ref 7–20)
BUN BLDV-MCNC: 18 MG/DL (ref 7–20)
BUN BLDV-MCNC: 20 MG/DL (ref 7–20)
BUN BLDV-MCNC: 24 MG/DL (ref 7–20)
BUN BLDV-MCNC: 25 MG/DL (ref 7–20)
BUN BLDV-MCNC: 34 MG/DL (ref 7–20)
BUN BLDV-MCNC: 34 MG/DL (ref 7–20)
BUN BLDV-MCNC: 35 MG/DL (ref 7–20)
BUN BLDV-MCNC: 37 MG/DL (ref 7–20)
BUN BLDV-MCNC: 6 MG/DL (ref 7–20)
BUN BLDV-MCNC: 7 MG/DL (ref 7–20)
BUN BLDV-MCNC: 8 MG/DL (ref 7–20)
BUN BLDV-MCNC: 8 MG/DL (ref 7–20)
C-REACTIVE PROTEIN: 67.7 MG/L (ref 0–5.1)
CALCIUM IONIZED: 0.99 MMOL/L (ref 1.12–1.32)
CALCIUM IONIZED: 0.99 MMOL/L (ref 1.12–1.32)
CALCIUM IONIZED: 1 MMOL/L (ref 1.12–1.32)
CALCIUM IONIZED: 1.01 MMOL/L (ref 1.12–1.32)
CALCIUM IONIZED: 1.01 MMOL/L (ref 1.12–1.32)
CALCIUM IONIZED: 1.02 MMOL/L (ref 1.12–1.32)
CALCIUM IONIZED: 1.04 MMOL/L (ref 1.12–1.32)
CALCIUM IONIZED: 1.06 MMOL/L (ref 1.12–1.32)
CALCIUM IONIZED: 1.1 MMOL/L (ref 1.12–1.32)
CALCIUM IONIZED: 1.12 MMOL/L (ref 1.12–1.32)
CALCIUM SERPL-MCNC: 6.3 MG/DL (ref 8.3–10.6)
CALCIUM SERPL-MCNC: 6.3 MG/DL (ref 8.3–10.6)
CALCIUM SERPL-MCNC: 6.4 MG/DL (ref 8.3–10.6)
CALCIUM SERPL-MCNC: 6.6 MG/DL (ref 8.3–10.6)
CALCIUM SERPL-MCNC: 6.7 MG/DL (ref 8.3–10.6)
CALCIUM SERPL-MCNC: 7.2 MG/DL (ref 8.3–10.6)
CALCIUM SERPL-MCNC: 8 MG/DL (ref 8.3–10.6)
CALCIUM SERPL-MCNC: 8 MG/DL (ref 8.3–10.6)
CALCIUM SERPL-MCNC: 8.2 MG/DL (ref 8.3–10.6)
CALCIUM SERPL-MCNC: 8.2 MG/DL (ref 8.3–10.6)
CALCIUM SERPL-MCNC: 8.3 MG/DL (ref 8.3–10.6)
CALCIUM SERPL-MCNC: 8.3 MG/DL (ref 8.3–10.6)
CALCIUM SERPL-MCNC: 8.4 MG/DL (ref 8.3–10.6)
CALCIUM SERPL-MCNC: 8.5 MG/DL (ref 8.3–10.6)
CALCIUM SERPL-MCNC: 8.9 MG/DL (ref 8.3–10.6)
CALCIUM SERPL-MCNC: 9.5 MG/DL (ref 8.3–10.6)
CARBOXYHEMOGLOBIN ARTERIAL: 0.2 % (ref 0–1.5)
CARBOXYHEMOGLOBIN ARTERIAL: 0.3 % (ref 0–1.5)
CARBOXYHEMOGLOBIN ARTERIAL: 0.3 % (ref 0–1.5)
CARBOXYHEMOGLOBIN ARTERIAL: 0.4 % (ref 0–1.5)
CARBOXYHEMOGLOBIN ARTERIAL: 0.5 % (ref 0–1.5)
CARBOXYHEMOGLOBIN ARTERIAL: 0.5 % (ref 0–1.5)
CARBOXYHEMOGLOBIN ARTERIAL: 0.6 % (ref 0–1.5)
CARBOXYHEMOGLOBIN ARTERIAL: 0.7 % (ref 0–1.5)
CARBOXYHEMOGLOBIN ARTERIAL: 0.8 % (ref 0–1.5)
CARBOXYHEMOGLOBIN ARTERIAL: 0.9 % (ref 0–1.5)
CARBOXYHEMOGLOBIN ARTERIAL: 1 % (ref 0–1.5)
CHLORIDE BLD-SCNC: 100 MMOL/L (ref 99–110)
CHLORIDE BLD-SCNC: 101 MMOL/L (ref 99–110)
CHLORIDE BLD-SCNC: 101 MMOL/L (ref 99–110)
CHLORIDE BLD-SCNC: 102 MMOL/L (ref 99–110)
CHLORIDE BLD-SCNC: 105 MMOL/L (ref 99–110)
CHLORIDE BLD-SCNC: 110 MMOL/L (ref 99–110)
CHLORIDE BLD-SCNC: 92 MMOL/L (ref 99–110)
CHLORIDE BLD-SCNC: 92 MMOL/L (ref 99–110)
CHLORIDE BLD-SCNC: 95 MMOL/L (ref 99–110)
CHLORIDE BLD-SCNC: 95 MMOL/L (ref 99–110)
CHLORIDE BLD-SCNC: 97 MMOL/L (ref 99–110)
CHLORIDE BLD-SCNC: 98 MMOL/L (ref 99–110)
CHLORIDE BLD-SCNC: 99 MMOL/L (ref 99–110)
CHLORIDE BLD-SCNC: 99 MMOL/L (ref 99–110)
CLARITY: ABNORMAL
CO2: 16 MMOL/L (ref 21–32)
CO2: 17 MMOL/L (ref 21–32)
CO2: 17 MMOL/L (ref 21–32)
CO2: 19 MMOL/L (ref 21–32)
CO2: 19 MMOL/L (ref 21–32)
CO2: 20 MMOL/L (ref 21–32)
CO2: 21 MMOL/L (ref 21–32)
CO2: 22 MMOL/L (ref 21–32)
CO2: 22 MMOL/L (ref 21–32)
CO2: 23 MMOL/L (ref 21–32)
CO2: 24 MMOL/L (ref 21–32)
CO2: 24 MMOL/L (ref 21–32)
COARSE CASTS, UA: ABNORMAL /LPF (ref 0–2)
COLOR: ABNORMAL
COLOR: YELLOW
COLOR: YELLOW
COMMENT UA: ABNORMAL
CREAT SERPL-MCNC: 0.6 MG/DL (ref 0.6–1.2)
CREAT SERPL-MCNC: 0.7 MG/DL (ref 0.6–1.2)
CREAT SERPL-MCNC: 0.8 MG/DL (ref 0.6–1.2)
CREAT SERPL-MCNC: 0.9 MG/DL (ref 0.6–1.2)
CREAT SERPL-MCNC: 0.9 MG/DL (ref 0.6–1.2)
CREAT SERPL-MCNC: 1.1 MG/DL (ref 0.6–1.2)
CREAT SERPL-MCNC: 1.2 MG/DL (ref 0.6–1.2)
CREAT SERPL-MCNC: 1.3 MG/DL (ref 0.6–1.2)
CREAT SERPL-MCNC: 1.4 MG/DL (ref 0.6–1.2)
CREAT SERPL-MCNC: 1.5 MG/DL (ref 0.6–1.2)
CREAT SERPL-MCNC: 1.8 MG/DL (ref 0.6–1.2)
CREAT SERPL-MCNC: 2.3 MG/DL (ref 0.6–1.2)
CREAT SERPL-MCNC: 2.5 MG/DL (ref 0.6–1.2)
CREAT SERPL-MCNC: 2.8 MG/DL (ref 0.6–1.2)
CREAT SERPL-MCNC: 3.2 MG/DL (ref 0.6–1.2)
CREAT SERPL-MCNC: 3.8 MG/DL (ref 0.6–1.2)
CREAT SERPL-MCNC: 3.8 MG/DL (ref 0.6–1.2)
CREAT SERPL-MCNC: 4 MG/DL (ref 0.6–1.2)
CULTURE, BLOOD 2: NORMAL
CULTURE, BLOOD 2: NORMAL
CULTURE, RESPIRATORY: NORMAL
DESCRIPTION BLOOD BANK: NORMAL
EKG ATRIAL RATE: 78 BPM
EKG ATRIAL RATE: 78 BPM
EKG ATRIAL RATE: 84 BPM
EKG DIAGNOSIS: NORMAL
EKG P AXIS: 46 DEGREES
EKG P AXIS: 47 DEGREES
EKG P AXIS: 47 DEGREES
EKG P-R INTERVAL: 144 MS
EKG P-R INTERVAL: 158 MS
EKG P-R INTERVAL: 160 MS
EKG Q-T INTERVAL: 368 MS
EKG Q-T INTERVAL: 382 MS
EKG Q-T INTERVAL: 418 MS
EKG QRS DURATION: 72 MS
EKG QRS DURATION: 78 MS
EKG QRS DURATION: 80 MS
EKG QTC CALCULATION (BAZETT): 434 MS
EKG QTC CALCULATION (BAZETT): 435 MS
EKG QTC CALCULATION (BAZETT): 476 MS
EKG R AXIS: 15 DEGREES
EKG R AXIS: 17 DEGREES
EKG R AXIS: 20 DEGREES
EKG T AXIS: 30 DEGREES
EKG T AXIS: 39 DEGREES
EKG T AXIS: 49 DEGREES
EKG VENTRICULAR RATE: 78 BPM
EKG VENTRICULAR RATE: 78 BPM
EKG VENTRICULAR RATE: 84 BPM
EOSINOPHILS ABSOLUTE: 0 K/UL (ref 0–0.6)
EOSINOPHILS ABSOLUTE: 0.1 K/UL (ref 0–0.6)
EOSINOPHILS ABSOLUTE: 0.3 K/UL (ref 0–0.6)
EOSINOPHILS RELATIVE PERCENT: 0 %
EOSINOPHILS RELATIVE PERCENT: 0.2 %
EOSINOPHILS RELATIVE PERCENT: 0.4 %
EOSINOPHILS RELATIVE PERCENT: 0.5 %
EOSINOPHILS RELATIVE PERCENT: 0.6 %
EOSINOPHILS RELATIVE PERCENT: 0.7 %
EOSINOPHILS RELATIVE PERCENT: 0.9 %
EOSINOPHILS RELATIVE PERCENT: 1 %
EOSINOPHILS RELATIVE PERCENT: 1.3 %
EOSINOPHILS RELATIVE PERCENT: 3 %
EPITHELIAL CELLS, UA: 2 /HPF (ref 0–5)
EPITHELIAL CELLS, UA: 21 /HPF (ref 0–5)
EPITHELIAL CELLS, UA: 6 /HPF (ref 0–5)
ESTIMATED AVERAGE GLUCOSE: 168.6 MG/DL
FERRITIN: 1080 NG/ML (ref 15–150)
FERRITIN: 5149 NG/ML (ref 15–150)
FERRITIN: 6951 NG/ML (ref 15–150)
FERRITIN: ABNORMAL NG/ML (ref 15–150)
FERRITIN: ABNORMAL NG/ML (ref 15–150)
FINE CASTS, UA: ABNORMAL /LPF (ref 0–2)
FINE CASTS, UA: ABNORMAL /LPF (ref 0–2)
GFR AFRICAN AMERICAN: 13
GFR AFRICAN AMERICAN: 14
GFR AFRICAN AMERICAN: 14
GFR AFRICAN AMERICAN: 17
GFR AFRICAN AMERICAN: 20
GFR AFRICAN AMERICAN: 23
GFR AFRICAN AMERICAN: 25
GFR AFRICAN AMERICAN: 33
GFR AFRICAN AMERICAN: 41
GFR AFRICAN AMERICAN: 45
GFR AFRICAN AMERICAN: 49
GFR AFRICAN AMERICAN: 53
GFR AFRICAN AMERICAN: 59
GFR AFRICAN AMERICAN: >60
GFR NON-AFRICAN AMERICAN: 11
GFR NON-AFRICAN AMERICAN: 12
GFR NON-AFRICAN AMERICAN: 12
GFR NON-AFRICAN AMERICAN: 14
GFR NON-AFRICAN AMERICAN: 17
GFR NON-AFRICAN AMERICAN: 19
GFR NON-AFRICAN AMERICAN: 21
GFR NON-AFRICAN AMERICAN: 28
GFR NON-AFRICAN AMERICAN: 34
GFR NON-AFRICAN AMERICAN: 37
GFR NON-AFRICAN AMERICAN: 40
GFR NON-AFRICAN AMERICAN: 44
GFR NON-AFRICAN AMERICAN: 49
GFR NON-AFRICAN AMERICAN: >60
GLOBULIN: 2.1 G/DL
GLOBULIN: 2.8 G/DL
GLOBULIN: 3.3 G/DL
GLOBULIN: 3.3 G/DL
GLOBULIN: 3.8 G/DL
GLUCOSE BLD-MCNC: 100 MG/DL (ref 70–99)
GLUCOSE BLD-MCNC: 102 MG/DL (ref 70–99)
GLUCOSE BLD-MCNC: 103 MG/DL (ref 70–99)
GLUCOSE BLD-MCNC: 104 MG/DL (ref 70–99)
GLUCOSE BLD-MCNC: 107 MG/DL (ref 70–99)
GLUCOSE BLD-MCNC: 107 MG/DL (ref 70–99)
GLUCOSE BLD-MCNC: 108 MG/DL (ref 70–99)
GLUCOSE BLD-MCNC: 109 MG/DL (ref 70–99)
GLUCOSE BLD-MCNC: 110 MG/DL (ref 70–99)
GLUCOSE BLD-MCNC: 111 MG/DL (ref 70–99)
GLUCOSE BLD-MCNC: 114 MG/DL (ref 70–99)
GLUCOSE BLD-MCNC: 118 MG/DL (ref 70–99)
GLUCOSE BLD-MCNC: 119 MG/DL (ref 70–99)
GLUCOSE BLD-MCNC: 119 MG/DL (ref 70–99)
GLUCOSE BLD-MCNC: 121 MG/DL (ref 70–99)
GLUCOSE BLD-MCNC: 125 MG/DL (ref 70–99)
GLUCOSE BLD-MCNC: 126 MG/DL (ref 70–99)
GLUCOSE BLD-MCNC: 128 MG/DL (ref 70–99)
GLUCOSE BLD-MCNC: 132 MG/DL (ref 70–99)
GLUCOSE BLD-MCNC: 133 MG/DL (ref 70–99)
GLUCOSE BLD-MCNC: 142 MG/DL (ref 70–99)
GLUCOSE BLD-MCNC: 145 MG/DL (ref 70–99)
GLUCOSE BLD-MCNC: 146 MG/DL (ref 70–99)
GLUCOSE BLD-MCNC: 146 MG/DL (ref 70–99)
GLUCOSE BLD-MCNC: 148 MG/DL (ref 70–99)
GLUCOSE BLD-MCNC: 149 MG/DL (ref 70–99)
GLUCOSE BLD-MCNC: 159 MG/DL (ref 70–99)
GLUCOSE BLD-MCNC: 163 MG/DL (ref 70–99)
GLUCOSE BLD-MCNC: 163 MG/DL (ref 70–99)
GLUCOSE BLD-MCNC: 168 MG/DL (ref 70–99)
GLUCOSE BLD-MCNC: 170 MG/DL (ref 70–99)
GLUCOSE BLD-MCNC: 179 MG/DL (ref 70–99)
GLUCOSE BLD-MCNC: 182 MG/DL (ref 70–99)
GLUCOSE BLD-MCNC: 193 MG/DL (ref 70–99)
GLUCOSE BLD-MCNC: 194 MG/DL (ref 70–99)
GLUCOSE BLD-MCNC: 199 MG/DL (ref 70–99)
GLUCOSE BLD-MCNC: 205 MG/DL (ref 70–99)
GLUCOSE BLD-MCNC: 206 MG/DL (ref 70–99)
GLUCOSE BLD-MCNC: 206 MG/DL (ref 70–99)
GLUCOSE BLD-MCNC: 208 MG/DL (ref 70–99)
GLUCOSE BLD-MCNC: 209 MG/DL (ref 70–99)
GLUCOSE BLD-MCNC: 214 MG/DL (ref 70–99)
GLUCOSE BLD-MCNC: 223 MG/DL (ref 70–99)
GLUCOSE BLD-MCNC: 228 MG/DL (ref 70–99)
GLUCOSE BLD-MCNC: 233 MG/DL (ref 70–99)
GLUCOSE BLD-MCNC: 237 MG/DL (ref 70–99)
GLUCOSE BLD-MCNC: 253 MG/DL (ref 70–99)
GLUCOSE BLD-MCNC: 257 MG/DL (ref 70–99)
GLUCOSE BLD-MCNC: 259 MG/DL (ref 70–99)
GLUCOSE BLD-MCNC: 284 MG/DL (ref 70–99)
GLUCOSE BLD-MCNC: 296 MG/DL (ref 70–99)
GLUCOSE BLD-MCNC: 298 MG/DL (ref 70–99)
GLUCOSE BLD-MCNC: 301 MG/DL (ref 70–99)
GLUCOSE BLD-MCNC: 309 MG/DL (ref 70–99)
GLUCOSE BLD-MCNC: 316 MG/DL (ref 70–99)
GLUCOSE BLD-MCNC: 318 MG/DL (ref 70–99)
GLUCOSE BLD-MCNC: 328 MG/DL (ref 70–99)
GLUCOSE BLD-MCNC: 350 MG/DL (ref 70–99)
GLUCOSE BLD-MCNC: 353 MG/DL (ref 70–99)
GLUCOSE BLD-MCNC: 358 MG/DL (ref 70–99)
GLUCOSE BLD-MCNC: 359 MG/DL (ref 70–99)
GLUCOSE BLD-MCNC: 66 MG/DL (ref 70–99)
GLUCOSE BLD-MCNC: 67 MG/DL (ref 70–99)
GLUCOSE BLD-MCNC: 67 MG/DL (ref 70–99)
GLUCOSE BLD-MCNC: 71 MG/DL (ref 70–99)
GLUCOSE BLD-MCNC: 71 MG/DL (ref 70–99)
GLUCOSE BLD-MCNC: 72 MG/DL (ref 70–99)
GLUCOSE BLD-MCNC: 73 MG/DL (ref 70–99)
GLUCOSE BLD-MCNC: 77 MG/DL (ref 70–99)
GLUCOSE BLD-MCNC: 77 MG/DL (ref 70–99)
GLUCOSE BLD-MCNC: 81 MG/DL (ref 70–99)
GLUCOSE BLD-MCNC: 84 MG/DL (ref 70–99)
GLUCOSE BLD-MCNC: 87 MG/DL (ref 70–99)
GLUCOSE BLD-MCNC: 88 MG/DL (ref 70–99)
GLUCOSE BLD-MCNC: 90 MG/DL (ref 70–99)
GLUCOSE BLD-MCNC: 91 MG/DL (ref 70–99)
GLUCOSE BLD-MCNC: 92 MG/DL (ref 70–99)
GLUCOSE BLD-MCNC: 94 MG/DL (ref 70–99)
GLUCOSE BLD-MCNC: 95 MG/DL (ref 70–99)
GLUCOSE BLD-MCNC: 99 MG/DL (ref 70–99)
GLUCOSE URINE: 500 MG/DL
GLUCOSE URINE: NEGATIVE MG/DL
GLUCOSE URINE: NEGATIVE MG/DL
GRAM STAIN RESULT: NORMAL
HAV IGM SER IA-ACNC: NORMAL
HBA1C MFR BLD: 7.5 %
HCO3 ARTERIAL: 15.1 MMOL/L (ref 21–29)
HCO3 ARTERIAL: 17.6 MMOL/L (ref 21–29)
HCO3 ARTERIAL: 19.5 MMOL/L (ref 21–29)
HCO3 ARTERIAL: 19.6 MMOL/L (ref 21–29)
HCO3 ARTERIAL: 22.1 MMOL/L (ref 21–29)
HCO3 ARTERIAL: 22.4 MMOL/L (ref 21–29)
HCO3 ARTERIAL: 22.7 MMOL/L (ref 21–29)
HCO3 ARTERIAL: 22.8 MMOL/L (ref 21–29)
HCO3 ARTERIAL: 23.1 MMOL/L (ref 21–29)
HCO3 ARTERIAL: 23.2 MMOL/L (ref 21–29)
HCO3 ARTERIAL: 23.4 MMOL/L (ref 21–29)
HCO3 ARTERIAL: 23.4 MMOL/L (ref 21–29)
HCO3 ARTERIAL: 23.6 MMOL/L (ref 21–29)
HCO3 ARTERIAL: 23.6 MMOL/L (ref 21–29)
HCO3 ARTERIAL: 23.8 MMOL/L (ref 21–29)
HCO3 ARTERIAL: 23.9 MMOL/L (ref 21–29)
HCO3 ARTERIAL: 24.8 MMOL/L (ref 21–29)
HCO3 ARTERIAL: 25.5 MMOL/L (ref 21–29)
HCO3 ARTERIAL: 25.7 MMOL/L (ref 21–29)
HCT VFR BLD CALC: 17.3 % (ref 36–48)
HCT VFR BLD CALC: 19 % (ref 36–48)
HCT VFR BLD CALC: 20.3 % (ref 36–48)
HCT VFR BLD CALC: 24.7 % (ref 36–48)
HCT VFR BLD CALC: 24.8 % (ref 36–48)
HCT VFR BLD CALC: 26.2 % (ref 36–48)
HCT VFR BLD CALC: 27.1 % (ref 36–48)
HCT VFR BLD CALC: 28.2 % (ref 36–48)
HCT VFR BLD CALC: 29.6 % (ref 36–48)
HCT VFR BLD CALC: 29.8 % (ref 36–48)
HCT VFR BLD CALC: 31.2 % (ref 36–48)
HCT VFR BLD CALC: 31.6 % (ref 36–48)
HCT VFR BLD CALC: 32.6 % (ref 36–48)
HEMOGLOBIN, ART, EXTENDED: 10.1 G/DL (ref 12–16)
HEMOGLOBIN, ART, EXTENDED: 5.8 G/DL (ref 12–16)
HEMOGLOBIN, ART, EXTENDED: 6.5 G/DL (ref 12–16)
HEMOGLOBIN, ART, EXTENDED: 7 G/DL (ref 12–16)
HEMOGLOBIN, ART, EXTENDED: 7.2 G/DL (ref 12–16)
HEMOGLOBIN, ART, EXTENDED: 7.3 G/DL (ref 12–16)
HEMOGLOBIN, ART, EXTENDED: 7.5 G/DL (ref 12–16)
HEMOGLOBIN, ART, EXTENDED: 7.5 G/DL (ref 12–16)
HEMOGLOBIN, ART, EXTENDED: 7.6 G/DL (ref 12–16)
HEMOGLOBIN, ART, EXTENDED: 7.6 G/DL (ref 12–16)
HEMOGLOBIN, ART, EXTENDED: 7.7 G/DL (ref 12–16)
HEMOGLOBIN, ART, EXTENDED: 7.8 G/DL (ref 12–16)
HEMOGLOBIN, ART, EXTENDED: 7.8 G/DL (ref 12–16)
HEMOGLOBIN, ART, EXTENDED: 8.1 G/DL (ref 12–16)
HEMOGLOBIN, ART, EXTENDED: 8.4 G/DL (ref 12–16)
HEMOGLOBIN, ART, EXTENDED: 8.7 G/DL (ref 12–16)
HEMOGLOBIN, ART, EXTENDED: 9 G/DL (ref 12–16)
HEMOGLOBIN, ART, EXTENDED: 9.2 G/DL (ref 12–16)
HEMOGLOBIN, ART, EXTENDED: 9.3 G/DL (ref 12–16)
HEMOGLOBIN, ART, EXTENDED: 9.4 G/DL (ref 12–16)
HEMOGLOBIN, ART, EXTENDED: 9.7 G/DL (ref 12–16)
HEMOGLOBIN: 10 G/DL (ref 12–16)
HEMOGLOBIN: 10.1 G/DL (ref 12–16)
HEMOGLOBIN: 10.3 G/DL (ref 12–16)
HEMOGLOBIN: 10.6 G/DL (ref 12–16)
HEMOGLOBIN: 5.2 G/DL (ref 12–16)
HEMOGLOBIN: 6.1 G/DL (ref 12–16)
HEMOGLOBIN: 6.6 G/DL (ref 12–16)
HEMOGLOBIN: 7.8 G/DL (ref 12–16)
HEMOGLOBIN: 8.1 G/DL (ref 12–16)
HEMOGLOBIN: 8.7 G/DL (ref 12–16)
HEMOGLOBIN: 8.8 G/DL (ref 12–16)
HEMOGLOBIN: 9.3 G/DL (ref 12–16)
HEMOGLOBIN: 9.4 G/DL (ref 12–16)
HEPATITIS B CORE IGM ANTIBODY: NORMAL
HEPATITIS B SURFACE ANTIGEN INTERPRETATION: NORMAL
HEPATITIS C ANTIBODY INTERPRETATION: NORMAL
HIV AG/AB: NORMAL
HIV ANTIGEN: NORMAL
HIV-1 ANTIBODY: NORMAL
HIV-2 AB: NORMAL
HYALINE CASTS: 12 /LPF (ref 0–8)
HYALINE CASTS: ABNORMAL /LPF (ref 0–2)
HYPOCHROMIA: ABNORMAL
KETONES, URINE: ABNORMAL MG/DL
KETONES, URINE: ABNORMAL MG/DL
KETONES, URINE: NEGATIVE MG/DL
L. PNEUMOPHILA SEROGP 1 UR AG: NORMAL
LACTATE DEHYDROGENASE: 663 U/L (ref 100–190)
LACTATE DEHYDROGENASE: 721 U/L (ref 100–190)
LACTATE DEHYDROGENASE: 804 U/L (ref 100–190)
LACTIC ACID: 1.3 MMOL/L (ref 0.4–2)
LACTIC ACID: 1.4 MMOL/L (ref 0.4–2)
LACTIC ACID: 1.7 MMOL/L (ref 0.4–2)
LACTIC ACID: 1.8 MMOL/L (ref 0.4–2)
LACTIC ACID: 11.7 MMOL/L (ref 0.4–2)
LACTIC ACID: 2.5 MMOL/L (ref 0.4–2)
LACTIC ACID: 2.6 MMOL/L (ref 0.4–2)
LACTIC ACID: 2.8 MMOL/L (ref 0.4–2)
LEUKOCYTE ESTERASE, URINE: NEGATIVE
LYMPHOCYTES ABSOLUTE: 0.3 K/UL (ref 1–5.1)
LYMPHOCYTES ABSOLUTE: 0.3 K/UL (ref 1–5.1)
LYMPHOCYTES ABSOLUTE: 0.4 K/UL (ref 1–5.1)
LYMPHOCYTES ABSOLUTE: 0.5 K/UL (ref 1–5.1)
LYMPHOCYTES ABSOLUTE: 0.5 K/UL (ref 1–5.1)
LYMPHOCYTES ABSOLUTE: 0.6 K/UL (ref 1–5.1)
LYMPHOCYTES ABSOLUTE: 1.1 K/UL (ref 1–5.1)
LYMPHOCYTES ABSOLUTE: 1.3 K/UL (ref 1–5.1)
LYMPHOCYTES RELATIVE PERCENT: 10.6 %
LYMPHOCYTES RELATIVE PERCENT: 11.1 %
LYMPHOCYTES RELATIVE PERCENT: 14.7 %
LYMPHOCYTES RELATIVE PERCENT: 2 %
LYMPHOCYTES RELATIVE PERCENT: 2 %
LYMPHOCYTES RELATIVE PERCENT: 20.8 %
LYMPHOCYTES RELATIVE PERCENT: 21.6 %
LYMPHOCYTES RELATIVE PERCENT: 3.7 %
LYMPHOCYTES RELATIVE PERCENT: 4.8 %
LYMPHOCYTES RELATIVE PERCENT: 5.4 %
LYMPHOCYTES RELATIVE PERCENT: 6 %
LYMPHOCYTES RELATIVE PERCENT: 7.9 %
LYMPHOCYTES RELATIVE PERCENT: 8.9 %
MAGNESIUM: 1.9 MG/DL (ref 1.8–2.4)
MAGNESIUM: 1.9 MG/DL (ref 1.8–2.4)
MAGNESIUM: 2.2 MG/DL (ref 1.8–2.4)
MAGNESIUM: 2.3 MG/DL (ref 1.8–2.4)
MAGNESIUM: 2.4 MG/DL (ref 1.8–2.4)
MAGNESIUM: 2.5 MG/DL (ref 1.8–2.4)
MAGNESIUM: 2.9 MG/DL (ref 1.8–2.4)
MCH RBC QN AUTO: 27.2 PG (ref 26–34)
MCH RBC QN AUTO: 27.4 PG (ref 26–34)
MCH RBC QN AUTO: 27.6 PG (ref 26–34)
MCH RBC QN AUTO: 27.7 PG (ref 26–34)
MCH RBC QN AUTO: 27.7 PG (ref 26–34)
MCH RBC QN AUTO: 27.8 PG (ref 26–34)
MCH RBC QN AUTO: 27.9 PG (ref 26–34)
MCH RBC QN AUTO: 28.1 PG (ref 26–34)
MCH RBC QN AUTO: 28.9 PG (ref 26–34)
MCHC RBC AUTO-ENTMCNC: 30.3 G/DL (ref 31–36)
MCHC RBC AUTO-ENTMCNC: 31.4 G/DL (ref 31–36)
MCHC RBC AUTO-ENTMCNC: 31.6 G/DL (ref 31–36)
MCHC RBC AUTO-ENTMCNC: 32.2 G/DL (ref 31–36)
MCHC RBC AUTO-ENTMCNC: 32.5 G/DL (ref 31–36)
MCHC RBC AUTO-ENTMCNC: 32.6 G/DL (ref 31–36)
MCHC RBC AUTO-ENTMCNC: 32.8 G/DL (ref 31–36)
MCHC RBC AUTO-ENTMCNC: 33 G/DL (ref 31–36)
MCHC RBC AUTO-ENTMCNC: 33 G/DL (ref 31–36)
MCHC RBC AUTO-ENTMCNC: 33.8 G/DL (ref 31–36)
MCV RBC AUTO: 83.8 FL (ref 80–100)
MCV RBC AUTO: 84.1 FL (ref 80–100)
MCV RBC AUTO: 84.1 FL (ref 80–100)
MCV RBC AUTO: 84.4 FL (ref 80–100)
MCV RBC AUTO: 84.5 FL (ref 80–100)
MCV RBC AUTO: 85.2 FL (ref 80–100)
MCV RBC AUTO: 85.4 FL (ref 80–100)
MCV RBC AUTO: 85.4 FL (ref 80–100)
MCV RBC AUTO: 85.7 FL (ref 80–100)
MCV RBC AUTO: 86.5 FL (ref 80–100)
MCV RBC AUTO: 86.7 FL (ref 80–100)
MCV RBC AUTO: 88.4 FL (ref 80–100)
MCV RBC AUTO: 92.1 FL (ref 80–100)
METAMYELOCYTES RELATIVE PERCENT: 1 %
METAMYELOCYTES RELATIVE PERCENT: 1 %
METHEMOGLOBIN ARTERIAL: 0.3 %
METHEMOGLOBIN ARTERIAL: 0.4 %
METHEMOGLOBIN ARTERIAL: 0.7 %
METHEMOGLOBIN ARTERIAL: 0.7 %
METHEMOGLOBIN ARTERIAL: 0.8 %
METHEMOGLOBIN ARTERIAL: 0.9 %
METHEMOGLOBIN ARTERIAL: 1 %
METHEMOGLOBIN ARTERIAL: 1.1 %
METHEMOGLOBIN ARTERIAL: 1.1 %
METHEMOGLOBIN ARTERIAL: 1.2 %
METHEMOGLOBIN ARTERIAL: 1.3 %
MICROCYTES: ABNORMAL
MICROSCOPIC EXAMINATION: YES
MISCELLANEOUS LAB TEST ORDER: ABNORMAL
MONOCYTES ABSOLUTE: 0.1 K/UL (ref 0–1.3)
MONOCYTES ABSOLUTE: 0.2 K/UL (ref 0–1.3)
MONOCYTES ABSOLUTE: 0.2 K/UL (ref 0–1.3)
MONOCYTES ABSOLUTE: 0.3 K/UL (ref 0–1.3)
MONOCYTES ABSOLUTE: 0.5 K/UL (ref 0–1.3)
MONOCYTES ABSOLUTE: 0.6 K/UL (ref 0–1.3)
MONOCYTES ABSOLUTE: 0.6 K/UL (ref 0–1.3)
MONOCYTES ABSOLUTE: 0.7 K/UL (ref 0–1.3)
MONOCYTES ABSOLUTE: 0.8 K/UL (ref 0–1.3)
MONOCYTES RELATIVE PERCENT: 10.6 %
MONOCYTES RELATIVE PERCENT: 13.8 %
MONOCYTES RELATIVE PERCENT: 2 %
MONOCYTES RELATIVE PERCENT: 2 %
MONOCYTES RELATIVE PERCENT: 4.1 %
MONOCYTES RELATIVE PERCENT: 4.2 %
MONOCYTES RELATIVE PERCENT: 4.6 %
MONOCYTES RELATIVE PERCENT: 4.7 %
MONOCYTES RELATIVE PERCENT: 5 %
MONOCYTES RELATIVE PERCENT: 5.3 %
MONOCYTES RELATIVE PERCENT: 5.8 %
MONOCYTES RELATIVE PERCENT: 6.3 %
MONOCYTES RELATIVE PERCENT: 6.6 %
MRSA SCREEN RT-PCR: NORMAL
MYELOCYTE PERCENT: 1 %
MYELOCYTE PERCENT: 1 %
NEUTROPHILS ABSOLUTE: 10.7 K/UL (ref 1.7–7.7)
NEUTROPHILS ABSOLUTE: 12.6 K/UL (ref 1.7–7.7)
NEUTROPHILS ABSOLUTE: 15.9 K/UL (ref 1.7–7.7)
NEUTROPHILS ABSOLUTE: 2.8 K/UL (ref 1.7–7.7)
NEUTROPHILS ABSOLUTE: 3.1 K/UL (ref 1.7–7.7)
NEUTROPHILS ABSOLUTE: 3.3 K/UL (ref 1.7–7.7)
NEUTROPHILS ABSOLUTE: 3.9 K/UL (ref 1.7–7.7)
NEUTROPHILS ABSOLUTE: 4 K/UL (ref 1.7–7.7)
NEUTROPHILS ABSOLUTE: 4.7 K/UL (ref 1.7–7.7)
NEUTROPHILS ABSOLUTE: 6.1 K/UL (ref 1.7–7.7)
NEUTROPHILS ABSOLUTE: 6.8 K/UL (ref 1.7–7.7)
NEUTROPHILS ABSOLUTE: 8.1 K/UL (ref 1.7–7.7)
NEUTROPHILS ABSOLUTE: 8.6 K/UL (ref 1.7–7.7)
NEUTROPHILS RELATIVE PERCENT: 65 %
NEUTROPHILS RELATIVE PERCENT: 65.9 %
NEUTROPHILS RELATIVE PERCENT: 70 %
NEUTROPHILS RELATIVE PERCENT: 80.7 %
NEUTROPHILS RELATIVE PERCENT: 82.5 %
NEUTROPHILS RELATIVE PERCENT: 82.9 %
NEUTROPHILS RELATIVE PERCENT: 85.8 %
NEUTROPHILS RELATIVE PERCENT: 86 %
NEUTROPHILS RELATIVE PERCENT: 86.2 %
NEUTROPHILS RELATIVE PERCENT: 87.4 %
NEUTROPHILS RELATIVE PERCENT: 88.8 %
NEUTROPHILS RELATIVE PERCENT: 90.2 %
NEUTROPHILS RELATIVE PERCENT: 91 %
NITRITE, URINE: NEGATIVE
NUCLEATED RED BLOOD CELLS: 1 /100 WBC
NUCLEATED RED BLOOD CELLS: 1 /100 WBC
NUCLEATED RED BLOOD CELLS: 16 /100 WBC
NUCLEATED RED BLOOD CELLS: 16 /100 WBC
O2 CONTENT ARTERIAL: 10 ML/DL
O2 CONTENT ARTERIAL: 11 ML/DL
O2 CONTENT ARTERIAL: 12 ML/DL
O2 CONTENT ARTERIAL: 12 ML/DL
O2 CONTENT ARTERIAL: 13 ML/DL
O2 CONTENT ARTERIAL: 7 ML/DL
O2 CONTENT ARTERIAL: 9 ML/DL
O2 SAT, ARTERIAL: 83.8 %
O2 SAT, ARTERIAL: 87.7 %
O2 SAT, ARTERIAL: 90 %
O2 SAT, ARTERIAL: 90.2 %
O2 SAT, ARTERIAL: 90.4 %
O2 SAT, ARTERIAL: 94 %
O2 SAT, ARTERIAL: 96 %
O2 SAT, ARTERIAL: 96.6 %
O2 SAT, ARTERIAL: 96.7 %
O2 SAT, ARTERIAL: 96.8 %
O2 SAT, ARTERIAL: 96.9 %
O2 SAT, ARTERIAL: 97.1 %
O2 SAT, ARTERIAL: 97.1 %
O2 SAT, ARTERIAL: 97.6 %
O2 SAT, ARTERIAL: 97.8 %
O2 SAT, ARTERIAL: 98.5 %
O2 SAT, ARTERIAL: 98.8 %
O2 SAT, ARTERIAL: 99 %
O2 SAT, ARTERIAL: 99 %
O2 SAT, ARTERIAL: 99.2 %
O2 SAT, ARTERIAL: 99.8 %
O2 THERAPY: ABNORMAL
OVALOCYTES: ABNORMAL
OVALOCYTES: ABNORMAL
PCO2 ARTERIAL: 30.7 MMHG (ref 35–45)
PCO2 ARTERIAL: 32.8 MMHG (ref 35–45)
PCO2 ARTERIAL: 36.7 MMHG (ref 35–45)
PCO2 ARTERIAL: 39.6 MMHG (ref 35–45)
PCO2 ARTERIAL: 47.3 MMHG (ref 35–45)
PCO2 ARTERIAL: 66 MMHG (ref 35–45)
PCO2 ARTERIAL: 66.4 MMHG (ref 35–45)
PCO2 ARTERIAL: 66.9 MMHG (ref 35–45)
PCO2 ARTERIAL: 67.5 MMHG (ref 35–45)
PCO2 ARTERIAL: 67.8 MMHG (ref 35–45)
PCO2 ARTERIAL: 71.4 MMHG (ref 35–45)
PCO2 ARTERIAL: 71.8 MMHG (ref 35–45)
PCO2 ARTERIAL: 72.7 MMHG (ref 35–45)
PCO2 ARTERIAL: 72.7 MMHG (ref 35–45)
PCO2 ARTERIAL: 77.4 MMHG (ref 35–45)
PCO2 ARTERIAL: 78.8 MMHG (ref 35–45)
PCO2 ARTERIAL: 78.8 MMHG (ref 35–45)
PCO2 ARTERIAL: 80 MMHG (ref 35–45)
PCO2 ARTERIAL: 81.3 MMHG (ref 35–45)
PCO2 ARTERIAL: 86.6 MMHG (ref 35–45)
PCO2 ARTERIAL: 89.8 MMHG (ref 35–45)
PDW BLD-RTO: 13.2 % (ref 12.4–15.4)
PDW BLD-RTO: 14.2 % (ref 12.4–15.4)
PDW BLD-RTO: 14.3 % (ref 12.4–15.4)
PDW BLD-RTO: 14.4 % (ref 12.4–15.4)
PDW BLD-RTO: 14.8 % (ref 12.4–15.4)
PDW BLD-RTO: 15.3 % (ref 12.4–15.4)
PDW BLD-RTO: 15.3 % (ref 12.4–15.4)
PDW BLD-RTO: 15.5 % (ref 12.4–15.4)
PDW BLD-RTO: 15.6 % (ref 12.4–15.4)
PDW BLD-RTO: 16.3 % (ref 12.4–15.4)
PERFORMED ON: ABNORMAL
PERFORMED ON: NORMAL
PH ARTERIAL: 6.96 (ref 7.35–7.45)
PH ARTERIAL: 7.01 (ref 7.35–7.45)
PH ARTERIAL: 7.02 (ref 7.35–7.45)
PH ARTERIAL: 7.03 (ref 7.35–7.45)
PH ARTERIAL: 7.04 (ref 7.35–7.45)
PH ARTERIAL: 7.07 (ref 7.35–7.45)
PH ARTERIAL: 7.08 (ref 7.35–7.45)
PH ARTERIAL: 7.09 (ref 7.35–7.45)
PH ARTERIAL: 7.11 (ref 7.35–7.45)
PH ARTERIAL: 7.13 (ref 7.35–7.45)
PH ARTERIAL: 7.14 (ref 7.35–7.45)
PH ARTERIAL: 7.15 (ref 7.35–7.45)
PH ARTERIAL: 7.16 (ref 7.35–7.45)
PH ARTERIAL: 7.19 (ref 7.35–7.45)
PH ARTERIAL: 7.31 (ref 7.35–7.45)
PH ARTERIAL: 7.38 (ref 7.35–7.45)
PH ARTERIAL: 7.41 (ref 7.35–7.45)
PH ARTERIAL: 7.41 (ref 7.35–7.45)
PH ARTERIAL: 7.44 (ref 7.35–7.45)
PH UA: 5.5 (ref 5–8)
PH UA: 6 (ref 5–8)
PH UA: 6 (ref 5–8)
PH VENOUS: 6.94 (ref 7.35–7.45)
PH VENOUS: 6.94 (ref 7.35–7.45)
PH VENOUS: 7.01 (ref 7.35–7.45)
PH VENOUS: 7.01 (ref 7.35–7.45)
PH VENOUS: 7.04 (ref 7.35–7.45)
PH VENOUS: 7.05 (ref 7.35–7.45)
PH VENOUS: 7.06 (ref 7.35–7.45)
PH VENOUS: 7.08 (ref 7.35–7.45)
PH VENOUS: 7.13 (ref 7.35–7.45)
PH VENOUS: 7.14 (ref 7.35–7.45)
PH VENOUS: 7.14 (ref 7.35–7.45)
PH VENOUS: 7.17 (ref 7.35–7.45)
PHOSPHORUS: 3 MG/DL (ref 2.5–4.9)
PHOSPHORUS: 3.4 MG/DL (ref 2.5–4.9)
PHOSPHORUS: 3.6 MG/DL (ref 2.5–4.9)
PHOSPHORUS: 4 MG/DL (ref 2.5–4.9)
PHOSPHORUS: 4.1 MG/DL (ref 2.5–4.9)
PHOSPHORUS: 4.1 MG/DL (ref 2.5–4.9)
PHOSPHORUS: 5.6 MG/DL (ref 2.5–4.9)
PHOSPHORUS: 6.9 MG/DL (ref 2.5–4.9)
PHOSPHORUS: 8.6 MG/DL (ref 2.5–4.9)
PLATELET # BLD: 125 K/UL (ref 135–450)
PLATELET # BLD: 200 K/UL (ref 135–450)
PLATELET # BLD: 200 K/UL (ref 135–450)
PLATELET # BLD: 209 K/UL (ref 135–450)
PLATELET # BLD: 250 K/UL (ref 135–450)
PLATELET # BLD: 251 K/UL (ref 135–450)
PLATELET # BLD: 252 K/UL (ref 135–450)
PLATELET # BLD: 253 K/UL (ref 135–450)
PLATELET # BLD: 268 K/UL (ref 135–450)
PLATELET # BLD: 276 K/UL (ref 135–450)
PLATELET # BLD: 29 K/UL (ref 135–450)
PLATELET # BLD: 387 K/UL (ref 135–450)
PLATELET # BLD: 83 K/UL (ref 135–450)
PLATELET SLIDE REVIEW: ABNORMAL
PMV BLD AUTO: 6.9 FL (ref 5–10.5)
PMV BLD AUTO: 7 FL (ref 5–10.5)
PMV BLD AUTO: 7.1 FL (ref 5–10.5)
PMV BLD AUTO: 7.4 FL (ref 5–10.5)
PMV BLD AUTO: 7.6 FL (ref 5–10.5)
PMV BLD AUTO: 7.7 FL (ref 5–10.5)
PMV BLD AUTO: 8 FL (ref 5–10.5)
PMV BLD AUTO: 8 FL (ref 5–10.5)
PO2 ARTERIAL: 102 MMHG (ref 75–108)
PO2 ARTERIAL: 103 MMHG (ref 75–108)
PO2 ARTERIAL: 105 MMHG (ref 75–108)
PO2 ARTERIAL: 106 MMHG (ref 75–108)
PO2 ARTERIAL: 107 MMHG (ref 75–108)
PO2 ARTERIAL: 110 MMHG (ref 75–108)
PO2 ARTERIAL: 120 MMHG (ref 75–108)
PO2 ARTERIAL: 122 MMHG (ref 75–108)
PO2 ARTERIAL: 154 MMHG (ref 75–108)
PO2 ARTERIAL: 180 MMHG (ref 75–108)
PO2 ARTERIAL: 189 MMHG (ref 75–108)
PO2 ARTERIAL: 49.5 MMHG (ref 75–108)
PO2 ARTERIAL: 61.4 MMHG (ref 75–108)
PO2 ARTERIAL: 63.3 MMHG (ref 75–108)
PO2 ARTERIAL: 68.5 MMHG (ref 75–108)
PO2 ARTERIAL: 78.8 MMHG (ref 75–108)
PO2 ARTERIAL: 79.5 MMHG (ref 75–108)
PO2 ARTERIAL: 82.3 MMHG (ref 75–108)
PO2 ARTERIAL: 89.2 MMHG (ref 75–108)
PO2 ARTERIAL: 91.4 MMHG (ref 75–108)
PO2 ARTERIAL: 94 MMHG (ref 75–108)
POIKILOCYTES: ABNORMAL
POTASSIUM REFLEX MAGNESIUM: 4 MMOL/L (ref 3.5–5.1)
POTASSIUM REFLEX MAGNESIUM: 4 MMOL/L (ref 3.5–5.1)
POTASSIUM REFLEX MAGNESIUM: 4.2 MMOL/L (ref 3.5–5.1)
POTASSIUM REFLEX MAGNESIUM: 4.3 MMOL/L (ref 3.5–5.1)
POTASSIUM REFLEX MAGNESIUM: 4.4 MMOL/L (ref 3.5–5.1)
POTASSIUM REFLEX MAGNESIUM: 4.6 MMOL/L (ref 3.5–5.1)
POTASSIUM REFLEX MAGNESIUM: 4.6 MMOL/L (ref 3.5–5.1)
POTASSIUM REFLEX MAGNESIUM: 4.8 MMOL/L (ref 3.5–5.1)
POTASSIUM REFLEX MAGNESIUM: 5 MMOL/L (ref 3.5–5.1)
POTASSIUM REFLEX MAGNESIUM: 5.9 MMOL/L (ref 3.5–5.1)
POTASSIUM SERPL-SCNC: 3.6 MMOL/L (ref 3.5–5.1)
POTASSIUM SERPL-SCNC: 3.9 MMOL/L (ref 3.5–5.1)
POTASSIUM SERPL-SCNC: 4.2 MMOL/L (ref 3.5–5.1)
POTASSIUM SERPL-SCNC: 4.4 MMOL/L (ref 3.5–5.1)
POTASSIUM SERPL-SCNC: 4.5 MMOL/L (ref 3.5–5.1)
POTASSIUM SERPL-SCNC: 4.6 MMOL/L (ref 3.5–5.1)
POTASSIUM SERPL-SCNC: 4.7 MMOL/L (ref 3.5–5.1)
POTASSIUM SERPL-SCNC: 4.8 MMOL/L (ref 3.5–5.1)
POTASSIUM SERPL-SCNC: 4.8 MMOL/L (ref 3.5–5.1)
POTASSIUM SERPL-SCNC: 4.9 MMOL/L (ref 3.5–5.1)
POTASSIUM SERPL-SCNC: 5 MMOL/L (ref 3.5–5.1)
POTASSIUM SERPL-SCNC: 6.4 MMOL/L (ref 3.5–5.1)
PROCALCITONIN: 0.5 NG/ML (ref 0–0.15)
PROCALCITONIN: 0.95 NG/ML (ref 0–0.15)
PROCALCITONIN: 1.13 NG/ML (ref 0–0.15)
PROCALCITONIN: 1.97 NG/ML (ref 0–0.15)
PROCALCITONIN: 2.07 NG/ML (ref 0–0.15)
PROCALCITONIN: 2.85 NG/ML (ref 0–0.15)
PROCALCITONIN: 3.3 NG/ML (ref 0–0.15)
PROCALCITONIN: 6.91 NG/ML (ref 0–0.15)
PROCALCITONIN: 9.9 NG/ML (ref 0–0.15)
PROTEIN UA: 100 MG/DL
PROTEIN UA: 30 MG/DL
PROTEIN UA: 30 MG/DL
RAPID INFLUENZA  B AGN: NEGATIVE
RAPID INFLUENZA  B AGN: NEGATIVE
RAPID INFLUENZA A AGN: NEGATIVE
RAPID INFLUENZA A AGN: NEGATIVE
RBC # BLD: 1.88 M/UL (ref 4–5.2)
RBC # BLD: 2.23 M/UL (ref 4–5.2)
RBC # BLD: 2.35 M/UL (ref 4–5.2)
RBC # BLD: 2.86 M/UL (ref 4–5.2)
RBC # BLD: 2.94 M/UL (ref 4–5.2)
RBC # BLD: 3.12 M/UL (ref 4–5.2)
RBC # BLD: 3.21 M/UL (ref 4–5.2)
RBC # BLD: 3.37 M/UL (ref 4–5.2)
RBC # BLD: 3.37 M/UL (ref 4–5.2)
RBC # BLD: 3.46 M/UL (ref 4–5.2)
RBC # BLD: 3.7 M/UL (ref 4–5.2)
RBC # BLD: 3.7 M/UL (ref 4–5.2)
RBC # BLD: 3.8 M/UL (ref 4–5.2)
RBC UA: 2 /HPF (ref 0–4)
RBC UA: 2 /HPF (ref 0–4)
RBC UA: 6 /HPF (ref 0–4)
REPORT: ABNORMAL
S PYO AG THROAT QL: NEGATIVE
S PYO THROAT QL CULT: NORMAL
SARS-COV-2: DETECTED
SEDIMENTATION RATE, ERYTHROCYTE: 15 MM/HR (ref 0–30)
SEDIMENTATION RATE, ERYTHROCYTE: 48 MM/HR (ref 0–30)
SEDIMENTATION RATE, ERYTHROCYTE: 49 MM/HR (ref 0–30)
SLIDE REVIEW: ABNORMAL
SODIUM BLD-SCNC: 126 MMOL/L (ref 136–145)
SODIUM BLD-SCNC: 128 MMOL/L (ref 136–145)
SODIUM BLD-SCNC: 129 MMOL/L (ref 136–145)
SODIUM BLD-SCNC: 132 MMOL/L (ref 136–145)
SODIUM BLD-SCNC: 132 MMOL/L (ref 136–145)
SODIUM BLD-SCNC: 133 MMOL/L (ref 136–145)
SODIUM BLD-SCNC: 133 MMOL/L (ref 136–145)
SODIUM BLD-SCNC: 134 MMOL/L (ref 136–145)
SODIUM BLD-SCNC: 135 MMOL/L (ref 136–145)
SODIUM BLD-SCNC: 135 MMOL/L (ref 136–145)
SODIUM BLD-SCNC: 137 MMOL/L (ref 136–145)
SODIUM BLD-SCNC: 138 MMOL/L (ref 136–145)
SPECIFIC GRAVITY UA: 1.01 (ref 1–1.03)
SPECIFIC GRAVITY UA: 1.02 (ref 1–1.03)
SPECIFIC GRAVITY UA: >1.03 (ref 1–1.03)
STREP PNEUMONIAE ANTIGEN, URINE: NORMAL
TCO2 ARTERIAL: 17.1 MMOL/L
TCO2 ARTERIAL: 19.7 MMOL/L
TCO2 ARTERIAL: 20.6 MMOL/L
TCO2 ARTERIAL: 21.9 MMOL/L
TCO2 ARTERIAL: 23.4 MMOL/L
TCO2 ARTERIAL: 24.3 MMOL/L
TCO2 ARTERIAL: 24.4 MMOL/L
TCO2 ARTERIAL: 24.8 MMOL/L
TCO2 ARTERIAL: 24.8 MMOL/L
TCO2 ARTERIAL: 25 MMOL/L
TCO2 ARTERIAL: 25.2 MMOL/L
TCO2 ARTERIAL: 25.3 MMOL/L
TCO2 ARTERIAL: 25.6 MMOL/L
TCO2 ARTERIAL: 25.8 MMOL/L
TCO2 ARTERIAL: 25.8 MMOL/L
TCO2 ARTERIAL: 25.9 MMOL/L
TCO2 ARTERIAL: 26.3 MMOL/L
TCO2 ARTERIAL: 26.4 MMOL/L
TCO2 ARTERIAL: 27.1 MMOL/L
TCO2 ARTERIAL: 27.7 MMOL/L
TCO2 ARTERIAL: 27.8 MMOL/L
THIS TEST SENT TO: ABNORMAL
TOTAL PROTEIN: 3.2 G/DL (ref 6.4–8.2)
TOTAL PROTEIN: 4.2 G/DL (ref 6.4–8.2)
TOTAL PROTEIN: 4.2 G/DL (ref 6.4–8.2)
TOTAL PROTEIN: 5.9 G/DL (ref 6.4–8.2)
TOTAL PROTEIN: 6.1 G/DL (ref 6.4–8.2)
TOTAL PROTEIN: 6.4 G/DL (ref 6.4–8.2)
TOTAL PROTEIN: 6.5 G/DL (ref 6.4–8.2)
TOTAL PROTEIN: 7.2 G/DL (ref 6.4–8.2)
TOXIC GRANULATION: PRESENT
TRIGL SERPL-MCNC: 105 MG/DL (ref 0–150)
TRIGL SERPL-MCNC: 115 MG/DL (ref 0–150)
TRIGL SERPL-MCNC: 133 MG/DL (ref 0–150)
TRIGL SERPL-MCNC: 157 MG/DL (ref 0–150)
TRIGL SERPL-MCNC: 43 MG/DL (ref 0–150)
TRIGL SERPL-MCNC: 51 MG/DL (ref 0–150)
TROPONIN: <0.01 NG/ML
URINE CULTURE, ROUTINE: NORMAL
URINE CULTURE, ROUTINE: NORMAL
URINE REFLEX TO CULTURE: ABNORMAL
URINE REFLEX TO CULTURE: YES
URINE REFLEX TO CULTURE: YES
URINE TYPE: ABNORMAL
UROBILINOGEN, URINE: 0.2 E.U./DL
UROBILINOGEN, URINE: 1 E.U./DL
UROBILINOGEN, URINE: 1 E.U./DL
VACUOLATED NEUTROPHILS: PRESENT
VANCOMYCIN RANDOM: 4.6 UG/ML
WBC # BLD: 12.1 K/UL (ref 4–11)
WBC # BLD: 13.1 K/UL (ref 4–11)
WBC # BLD: 16.6 K/UL (ref 4–11)
WBC # BLD: 3.5 K/UL (ref 4–11)
WBC # BLD: 3.7 K/UL (ref 4–11)
WBC # BLD: 4.6 K/UL (ref 4–11)
WBC # BLD: 5.1 K/UL (ref 4–11)
WBC # BLD: 5.6 K/UL (ref 4–11)
WBC # BLD: 5.9 K/UL (ref 4–11)
WBC # BLD: 7 K/UL (ref 4–11)
WBC # BLD: 7.5 K/UL (ref 4–11)
WBC # BLD: 9.4 K/UL (ref 4–11)
WBC # BLD: 9.8 K/UL (ref 4–11)
WBC UA: 18 /HPF (ref 0–5)
WBC UA: 19 /HPF (ref 0–5)
WBC UA: 3 /HPF (ref 0–5)

## 2020-01-01 PROCEDURE — 2000000000 HC ICU R&B

## 2020-01-01 PROCEDURE — 83735 ASSAY OF MAGNESIUM: CPT

## 2020-01-01 PROCEDURE — 2700000000 HC OXYGEN THERAPY PER DAY

## 2020-01-01 PROCEDURE — 84484 ASSAY OF TROPONIN QUANT: CPT

## 2020-01-01 PROCEDURE — 2500000003 HC RX 250 WO HCPCS: Performed by: FAMILY MEDICINE

## 2020-01-01 PROCEDURE — 86850 RBC ANTIBODY SCREEN: CPT

## 2020-01-01 PROCEDURE — 80202 ASSAY OF VANCOMYCIN: CPT

## 2020-01-01 PROCEDURE — 83605 ASSAY OF LACTIC ACID: CPT

## 2020-01-01 PROCEDURE — 71045 X-RAY EXAM CHEST 1 VIEW: CPT

## 2020-01-01 PROCEDURE — 82803 BLOOD GASES ANY COMBINATION: CPT

## 2020-01-01 PROCEDURE — 85025 COMPLETE CBC W/AUTO DIFF WBC: CPT

## 2020-01-01 PROCEDURE — 6370000000 HC RX 637 (ALT 250 FOR IP): Performed by: INTERNAL MEDICINE

## 2020-01-01 PROCEDURE — 99291 CRITICAL CARE FIRST HOUR: CPT | Performed by: INTERNAL MEDICINE

## 2020-01-01 PROCEDURE — 80048 BASIC METABOLIC PNL TOTAL CA: CPT

## 2020-01-01 PROCEDURE — 93005 ELECTROCARDIOGRAM TRACING: CPT | Performed by: STUDENT IN AN ORGANIZED HEALTH CARE EDUCATION/TRAINING PROGRAM

## 2020-01-01 PROCEDURE — 2580000003 HC RX 258: Performed by: NURSE PRACTITIONER

## 2020-01-01 PROCEDURE — 6360000002 HC RX W HCPCS: Performed by: NURSE PRACTITIONER

## 2020-01-01 PROCEDURE — 96374 THER/PROPH/DIAG INJ IV PUSH: CPT

## 2020-01-01 PROCEDURE — 87804 INFLUENZA ASSAY W/OPTIC: CPT

## 2020-01-01 PROCEDURE — 6360000002 HC RX W HCPCS: Performed by: INTERNAL MEDICINE

## 2020-01-01 PROCEDURE — 70450 CT HEAD/BRAIN W/O DYE: CPT

## 2020-01-01 PROCEDURE — 73560 X-RAY EXAM OF KNEE 1 OR 2: CPT

## 2020-01-01 PROCEDURE — 2060000000 HC ICU INTERMEDIATE R&B

## 2020-01-01 PROCEDURE — 93010 ELECTROCARDIOGRAM REPORT: CPT | Performed by: INTERNAL MEDICINE

## 2020-01-01 PROCEDURE — 87880 STREP A ASSAY W/OPTIC: CPT

## 2020-01-01 PROCEDURE — 90945 DIALYSIS ONE EVALUATION: CPT

## 2020-01-01 PROCEDURE — 6370000000 HC RX 637 (ALT 250 FOR IP): Performed by: NURSE PRACTITIONER

## 2020-01-01 PROCEDURE — 36556 INSERT NON-TUNNEL CV CATH: CPT | Performed by: NURSE PRACTITIONER

## 2020-01-01 PROCEDURE — APPNB15 APP NON BILLABLE TIME 0-15 MINS: Performed by: NURSE PRACTITIONER

## 2020-01-01 PROCEDURE — 2580000003 HC RX 258: Performed by: STUDENT IN AN ORGANIZED HEALTH CARE EDUCATION/TRAINING PROGRAM

## 2020-01-01 PROCEDURE — 82330 ASSAY OF CALCIUM: CPT

## 2020-01-01 PROCEDURE — 99233 SBSQ HOSP IP/OBS HIGH 50: CPT | Performed by: INTERNAL MEDICINE

## 2020-01-01 PROCEDURE — 84100 ASSAY OF PHOSPHORUS: CPT

## 2020-01-01 PROCEDURE — 96367 TX/PROPH/DG ADDL SEQ IV INF: CPT

## 2020-01-01 PROCEDURE — 6370000000 HC RX 637 (ALT 250 FOR IP): Performed by: HOSPITALIST

## 2020-01-01 PROCEDURE — 36415 COLL VENOUS BLD VENIPUNCTURE: CPT

## 2020-01-01 PROCEDURE — 80074 ACUTE HEPATITIS PANEL: CPT

## 2020-01-01 PROCEDURE — 2580000003 HC RX 258: Performed by: INTERNAL MEDICINE

## 2020-01-01 PROCEDURE — 94750 HC PULMONARY COMPLIANCE STUDY: CPT

## 2020-01-01 PROCEDURE — 94761 N-INVAS EAR/PLS OXIMETRY MLT: CPT

## 2020-01-01 PROCEDURE — 6370000000 HC RX 637 (ALT 250 FOR IP): Performed by: PHYSICIAN ASSISTANT

## 2020-01-01 PROCEDURE — 6370000000 HC RX 637 (ALT 250 FOR IP): Performed by: FAMILY MEDICINE

## 2020-01-01 PROCEDURE — 93005 ELECTROCARDIOGRAM TRACING: CPT | Performed by: EMERGENCY MEDICINE

## 2020-01-01 PROCEDURE — 6360000002 HC RX W HCPCS: Performed by: FAMILY MEDICINE

## 2020-01-01 PROCEDURE — 87081 CULTURE SCREEN ONLY: CPT

## 2020-01-01 PROCEDURE — 37799 UNLISTED PX VASCULAR SURGERY: CPT

## 2020-01-01 PROCEDURE — 6360000002 HC RX W HCPCS

## 2020-01-01 PROCEDURE — 99223 1ST HOSP IP/OBS HIGH 75: CPT | Performed by: INTERNAL MEDICINE

## 2020-01-01 PROCEDURE — 2580000003 HC RX 258: Performed by: HOSPITALIST

## 2020-01-01 PROCEDURE — 85652 RBC SED RATE AUTOMATED: CPT

## 2020-01-01 PROCEDURE — 6360000002 HC RX W HCPCS: Performed by: HOSPITALIST

## 2020-01-01 PROCEDURE — 80076 HEPATIC FUNCTION PANEL: CPT

## 2020-01-01 PROCEDURE — 94003 VENT MGMT INPAT SUBQ DAY: CPT

## 2020-01-01 PROCEDURE — 80053 COMPREHEN METABOLIC PANEL: CPT

## 2020-01-01 PROCEDURE — 93925 LOWER EXTREMITY STUDY: CPT

## 2020-01-01 PROCEDURE — 2500000003 HC RX 250 WO HCPCS: Performed by: INTERNAL MEDICINE

## 2020-01-01 PROCEDURE — 81001 URINALYSIS AUTO W/SCOPE: CPT

## 2020-01-01 PROCEDURE — 96375 TX/PRO/DX INJ NEW DRUG ADDON: CPT

## 2020-01-01 PROCEDURE — 0BH17EZ INSERTION OF ENDOTRACHEAL AIRWAY INTO TRACHEA, VIA NATURAL OR ARTIFICIAL OPENING: ICD-10-PCS | Performed by: INTERNAL MEDICINE

## 2020-01-01 PROCEDURE — 87205 SMEAR GRAM STAIN: CPT

## 2020-01-01 PROCEDURE — 86701 HIV-1ANTIBODY: CPT

## 2020-01-01 PROCEDURE — 84145 PROCALCITONIN (PCT): CPT

## 2020-01-01 PROCEDURE — 99283 EMERGENCY DEPT VISIT LOW MDM: CPT

## 2020-01-01 PROCEDURE — 83520 IMMUNOASSAY QUANT NOS NONAB: CPT

## 2020-01-01 PROCEDURE — 87641 MR-STAPH DNA AMP PROBE: CPT

## 2020-01-01 PROCEDURE — 6360000002 HC RX W HCPCS: Performed by: STUDENT IN AN ORGANIZED HEALTH CARE EDUCATION/TRAINING PROGRAM

## 2020-01-01 PROCEDURE — U0002 COVID-19 LAB TEST NON-CDC: HCPCS

## 2020-01-01 PROCEDURE — 87040 BLOOD CULTURE FOR BACTERIA: CPT

## 2020-01-01 PROCEDURE — 84478 ASSAY OF TRIGLYCERIDES: CPT

## 2020-01-01 PROCEDURE — 36556 INSERT NON-TUNNEL CV CATH: CPT

## 2020-01-01 PROCEDURE — 87449 NOS EACH ORGANISM AG IA: CPT

## 2020-01-01 PROCEDURE — 82728 ASSAY OF FERRITIN: CPT

## 2020-01-01 PROCEDURE — 80069 RENAL FUNCTION PANEL: CPT

## 2020-01-01 PROCEDURE — 2500000003 HC RX 250 WO HCPCS: Performed by: NURSE PRACTITIONER

## 2020-01-01 PROCEDURE — 70498 CT ANGIOGRAPHY NECK: CPT

## 2020-01-01 PROCEDURE — 36592 COLLECT BLOOD FROM PICC: CPT

## 2020-01-01 PROCEDURE — 71250 CT THORAX DX C-: CPT

## 2020-01-01 PROCEDURE — 51702 INSERT TEMP BLADDER CATH: CPT

## 2020-01-01 PROCEDURE — 2580000003 HC RX 258

## 2020-01-01 PROCEDURE — 87086 URINE CULTURE/COLONY COUNT: CPT

## 2020-01-01 PROCEDURE — 36620 INSERTION CATHETER ARTERY: CPT

## 2020-01-01 PROCEDURE — 6360000002 HC RX W HCPCS: Performed by: PHYSICIAN ASSISTANT

## 2020-01-01 PROCEDURE — 2580000003 HC RX 258: Performed by: PHYSICIAN ASSISTANT

## 2020-01-01 PROCEDURE — 36556 INSERT NON-TUNNEL CV CATH: CPT | Performed by: INTERNAL MEDICINE

## 2020-01-01 PROCEDURE — 36600 WITHDRAWAL OF ARTERIAL BLOOD: CPT

## 2020-01-01 PROCEDURE — 86140 C-REACTIVE PROTEIN: CPT

## 2020-01-01 PROCEDURE — 83615 LACTATE (LD) (LDH) ENZYME: CPT

## 2020-01-01 PROCEDURE — 99285 EMERGENCY DEPT VISIT HI MDM: CPT

## 2020-01-01 PROCEDURE — 76937 US GUIDE VASCULAR ACCESS: CPT | Performed by: INTERNAL MEDICINE

## 2020-01-01 PROCEDURE — 02HV33Z INSERTION OF INFUSION DEVICE INTO SUPERIOR VENA CAVA, PERCUTANEOUS APPROACH: ICD-10-PCS | Performed by: INTERNAL MEDICINE

## 2020-01-01 PROCEDURE — 87390 HIV-1 AG IA: CPT

## 2020-01-01 PROCEDURE — 83036 HEMOGLOBIN GLYCOSYLATED A1C: CPT

## 2020-01-01 PROCEDURE — 2500000003 HC RX 250 WO HCPCS

## 2020-01-01 PROCEDURE — 71260 CT THORAX DX C+: CPT

## 2020-01-01 PROCEDURE — 86702 HIV-2 ANTIBODY: CPT

## 2020-01-01 PROCEDURE — 86901 BLOOD TYPING SEROLOGIC RH(D): CPT

## 2020-01-01 PROCEDURE — 94760 N-INVAS EAR/PLS OXIMETRY 1: CPT

## 2020-01-01 PROCEDURE — 36430 TRANSFUSION BLD/BLD COMPNT: CPT

## 2020-01-01 PROCEDURE — P9016 RBC LEUKOCYTES REDUCED: HCPCS

## 2020-01-01 PROCEDURE — 36620 INSERTION CATHETER ARTERY: CPT | Performed by: NURSE PRACTITIONER

## 2020-01-01 PROCEDURE — 94640 AIRWAY INHALATION TREATMENT: CPT

## 2020-01-01 PROCEDURE — 5A1955Z RESPIRATORY VENTILATION, GREATER THAN 96 CONSECUTIVE HOURS: ICD-10-PCS | Performed by: INTERNAL MEDICINE

## 2020-01-01 PROCEDURE — 5A1D90Z PERFORMANCE OF URINARY FILTRATION, CONTINUOUS, GREATER THAN 18 HOURS PER DAY: ICD-10-PCS | Performed by: INTERNAL MEDICINE

## 2020-01-01 PROCEDURE — 31500 INSERT EMERGENCY AIRWAY: CPT | Performed by: NURSE PRACTITIONER

## 2020-01-01 PROCEDURE — 94002 VENT MGMT INPAT INIT DAY: CPT

## 2020-01-01 PROCEDURE — 93005 ELECTROCARDIOGRAM TRACING: CPT | Performed by: NURSE PRACTITIONER

## 2020-01-01 PROCEDURE — 96365 THER/PROPH/DIAG IV INF INIT: CPT

## 2020-01-01 PROCEDURE — 86900 BLOOD TYPING SEROLOGIC ABO: CPT

## 2020-01-01 PROCEDURE — 87070 CULTURE OTHR SPECIMN AEROBIC: CPT

## 2020-01-01 PROCEDURE — 86923 COMPATIBILITY TEST ELECTRIC: CPT

## 2020-01-01 PROCEDURE — 6360000004 HC RX CONTRAST MEDICATION: Performed by: NURSE PRACTITIONER

## 2020-01-01 RX ORDER — IBUPROFEN 400 MG/1
800 TABLET ORAL ONCE
Status: COMPLETED | OUTPATIENT
Start: 2020-01-01 | End: 2020-01-01

## 2020-01-01 RX ORDER — CALCIUM GLUCONATE 20 MG/ML
1 INJECTION, SOLUTION INTRAVENOUS
Status: COMPLETED | OUTPATIENT
Start: 2020-01-01 | End: 2020-01-01

## 2020-01-01 RX ORDER — CHOLECALCIFEROL (VITAMIN D3) 50 MCG
2000 TABLET ORAL DAILY
COMMUNITY
Start: 2020-01-01

## 2020-01-01 RX ORDER — SODIUM CHLORIDE, SODIUM LACTATE, POTASSIUM CHLORIDE, CALCIUM CHLORIDE 600; 310; 30; 20 MG/100ML; MG/100ML; MG/100ML; MG/100ML
1000 INJECTION, SOLUTION INTRAVENOUS ONCE
Status: COMPLETED | OUTPATIENT
Start: 2020-01-01 | End: 2020-01-01

## 2020-01-01 RX ORDER — ACETAMINOPHEN 500 MG
1000 TABLET ORAL ONCE
Status: COMPLETED | OUTPATIENT
Start: 2020-01-01 | End: 2020-01-01

## 2020-01-01 RX ORDER — EZETIMIBE 10 MG/1
10 TABLET ORAL DAILY
COMMUNITY
Start: 2020-01-01

## 2020-01-01 RX ORDER — TRAMADOL HYDROCHLORIDE 50 MG/1
50 TABLET ORAL 4 TIMES DAILY PRN
COMMUNITY
Start: 2020-01-01

## 2020-01-01 RX ORDER — POLYETHYLENE GLYCOL 3350 17 G/17G
17 POWDER, FOR SOLUTION ORAL DAILY
Status: DISCONTINUED | OUTPATIENT
Start: 2020-01-01 | End: 2020-01-01 | Stop reason: HOSPADM

## 2020-01-01 RX ORDER — LACTOBACILLUS RHAMNOSUS GG 10B CELL
1 CAPSULE ORAL 2 TIMES DAILY WITH MEALS
Status: DISCONTINUED | OUTPATIENT
Start: 2020-01-01 | End: 2020-01-01

## 2020-01-01 RX ORDER — 0.9 % SODIUM CHLORIDE 0.9 %
1000 INTRAVENOUS SOLUTION INTRAVENOUS ONCE
Status: COMPLETED | OUTPATIENT
Start: 2020-01-01 | End: 2020-01-01

## 2020-01-01 RX ORDER — SERTRALINE HYDROCHLORIDE 25 MG/1
25 TABLET, FILM COATED ORAL DAILY
Status: DISCONTINUED | OUTPATIENT
Start: 2020-01-01 | End: 2020-01-01 | Stop reason: HOSPADM

## 2020-01-01 RX ORDER — MECLIZINE HYDROCHLORIDE 25 MG/1
25 TABLET ORAL 3 TIMES DAILY PRN
COMMUNITY

## 2020-01-01 RX ORDER — PROMETHAZINE HYDROCHLORIDE 25 MG/1
12.5 TABLET ORAL EVERY 6 HOURS PRN
Status: DISCONTINUED | OUTPATIENT
Start: 2020-01-01 | End: 2020-01-01 | Stop reason: HOSPADM

## 2020-01-01 RX ORDER — ALBUTEROL SULFATE 90 UG/1
2 AEROSOL, METERED RESPIRATORY (INHALATION) EVERY 4 HOURS PRN
Status: DISCONTINUED | OUTPATIENT
Start: 2020-01-01 | End: 2020-01-01

## 2020-01-01 RX ORDER — FUROSEMIDE 10 MG/ML
100 INJECTION INTRAMUSCULAR; INTRAVENOUS ONCE
Status: COMPLETED | OUTPATIENT
Start: 2020-01-01 | End: 2020-01-01

## 2020-01-01 RX ORDER — DOCUSATE SODIUM 100 MG
100 CAPSULE ORAL 2 TIMES DAILY PRN
COMMUNITY
Start: 2020-01-01

## 2020-01-01 RX ORDER — SODIUM CHLORIDE 9 MG/ML
INJECTION, SOLUTION INTRAVENOUS CONTINUOUS
Status: DISCONTINUED | OUTPATIENT
Start: 2020-01-01 | End: 2020-01-01

## 2020-01-01 RX ORDER — CALCIUM GLUCONATE 20 MG/ML
1 INJECTION, SOLUTION INTRAVENOUS PRN
Status: DISCONTINUED | OUTPATIENT
Start: 2020-01-01 | End: 2020-01-01 | Stop reason: HOSPADM

## 2020-01-01 RX ORDER — FUROSEMIDE 10 MG/ML
20 INJECTION INTRAMUSCULAR; INTRAVENOUS 2 TIMES DAILY
Status: DISCONTINUED | OUTPATIENT
Start: 2020-01-01 | End: 2020-01-01

## 2020-01-01 RX ORDER — NICOTINE POLACRILEX 4 MG
15 LOZENGE BUCCAL PRN
Status: DISCONTINUED | OUTPATIENT
Start: 2020-01-01 | End: 2020-01-01 | Stop reason: HOSPADM

## 2020-01-01 RX ORDER — ONDANSETRON 2 MG/ML
4 INJECTION INTRAMUSCULAR; INTRAVENOUS ONCE
Status: COMPLETED | OUTPATIENT
Start: 2020-01-01 | End: 2020-01-01

## 2020-01-01 RX ORDER — POTASSIUM CHLORIDE 29.8 MG/ML
20 INJECTION INTRAVENOUS ONCE
Status: COMPLETED | OUTPATIENT
Start: 2020-01-01 | End: 2020-01-01

## 2020-01-01 RX ORDER — DEXTROSE MONOHYDRATE 25 G/50ML
25 INJECTION, SOLUTION INTRAVENOUS ONCE
Status: COMPLETED | OUTPATIENT
Start: 2020-01-01 | End: 2020-01-01

## 2020-01-01 RX ORDER — ACETAMINOPHEN 325 MG/1
650 TABLET ORAL ONCE
Status: DISCONTINUED | OUTPATIENT
Start: 2020-01-01 | End: 2020-01-01

## 2020-01-01 RX ORDER — PROPOFOL 10 MG/ML
10 INJECTION, EMULSION INTRAVENOUS CONTINUOUS
Status: DISCONTINUED | OUTPATIENT
Start: 2020-01-01 | End: 2020-01-01

## 2020-01-01 RX ORDER — PROPOFOL 10 MG/ML
10 INJECTION, EMULSION INTRAVENOUS CONTINUOUS
Status: DISCONTINUED | OUTPATIENT
Start: 2020-01-01 | End: 2020-01-01 | Stop reason: HOSPADM

## 2020-01-01 RX ORDER — FENTANYL CITRATE 50 UG/ML
25 INJECTION, SOLUTION INTRAMUSCULAR; INTRAVENOUS
Status: DISCONTINUED | OUTPATIENT
Start: 2020-01-01 | End: 2020-01-01 | Stop reason: HOSPADM

## 2020-01-01 RX ORDER — MAGNESIUM SULFATE IN WATER 40 MG/ML
2 INJECTION, SOLUTION INTRAVENOUS ONCE
Status: COMPLETED | OUTPATIENT
Start: 2020-01-01 | End: 2020-01-01

## 2020-01-01 RX ORDER — DEXTROSE MONOHYDRATE 25 G/50ML
25 INJECTION, SOLUTION INTRAVENOUS PRN
Status: DISCONTINUED | OUTPATIENT
Start: 2020-01-01 | End: 2020-01-01 | Stop reason: HOSPADM

## 2020-01-01 RX ORDER — METHYLPREDNISOLONE SODIUM SUCCINATE 125 MG/2ML
125 INJECTION, POWDER, LYOPHILIZED, FOR SOLUTION INTRAMUSCULAR; INTRAVENOUS ONCE
Status: COMPLETED | OUTPATIENT
Start: 2020-01-01 | End: 2020-01-01

## 2020-01-01 RX ORDER — DEXTROSE MONOHYDRATE 50 MG/ML
100 INJECTION, SOLUTION INTRAVENOUS PRN
Status: DISCONTINUED | OUTPATIENT
Start: 2020-01-01 | End: 2020-01-01 | Stop reason: HOSPADM

## 2020-01-01 RX ORDER — PROPOFOL 10 MG/ML
INJECTION, EMULSION INTRAVENOUS
Status: DISCONTINUED
Start: 2020-01-01 | End: 2020-01-01

## 2020-01-01 RX ORDER — 0.9 % SODIUM CHLORIDE 0.9 %
1000 INTRAVENOUS SOLUTION INTRAVENOUS ONCE
Status: DISCONTINUED | OUTPATIENT
Start: 2020-01-01 | End: 2020-01-01

## 2020-01-01 RX ORDER — ALBUTEROL SULFATE 90 UG/1
6 AEROSOL, METERED RESPIRATORY (INHALATION) EVERY 4 HOURS PRN
Status: DISCONTINUED | OUTPATIENT
Start: 2020-01-01 | End: 2020-01-01 | Stop reason: HOSPADM

## 2020-01-01 RX ORDER — SODIUM CHLORIDE 0.9 % (FLUSH) 0.9 %
10 SYRINGE (ML) INJECTION PRN
Status: DISCONTINUED | OUTPATIENT
Start: 2020-01-01 | End: 2020-01-01 | Stop reason: HOSPADM

## 2020-01-01 RX ORDER — ACETAMINOPHEN 650 MG/1
650 SUPPOSITORY RECTAL EVERY 6 HOURS PRN
Status: DISCONTINUED | OUTPATIENT
Start: 2020-01-01 | End: 2020-01-01 | Stop reason: HOSPADM

## 2020-01-01 RX ORDER — MAGNESIUM SULFATE 1 G/100ML
1 INJECTION INTRAVENOUS PRN
Status: DISCONTINUED | OUTPATIENT
Start: 2020-01-01 | End: 2020-01-01 | Stop reason: HOSPADM

## 2020-01-01 RX ORDER — MINERAL OIL AND WHITE PETROLATUM 150; 830 MG/G; MG/G
OINTMENT OPHTHALMIC EVERY 4 HOURS
Status: DISCONTINUED | OUTPATIENT
Start: 2020-01-01 | End: 2020-01-01 | Stop reason: HOSPADM

## 2020-01-01 RX ORDER — ROSUVASTATIN CALCIUM 10 MG/1
10 TABLET, COATED ORAL DAILY
Status: DISCONTINUED | OUTPATIENT
Start: 2020-01-01 | End: 2020-01-01

## 2020-01-01 RX ORDER — DIPHENHYDRAMINE HYDROCHLORIDE 50 MG/ML
12.5 INJECTION INTRAMUSCULAR; INTRAVENOUS ONCE
Status: COMPLETED | OUTPATIENT
Start: 2020-01-01 | End: 2020-01-01

## 2020-01-01 RX ORDER — DULAGLUTIDE 0.75 MG/.5ML
0.75 INJECTION, SOLUTION SUBCUTANEOUS WEEKLY
COMMUNITY

## 2020-01-01 RX ORDER — DOCUSATE SODIUM 100 MG/1
100 CAPSULE, LIQUID FILLED ORAL 2 TIMES DAILY PRN
Status: DISCONTINUED | OUTPATIENT
Start: 2020-01-01 | End: 2020-01-01

## 2020-01-01 RX ORDER — HYDROXYCHLOROQUINE SULFATE 200 MG/1
200 TABLET, FILM COATED ORAL EVERY 12 HOURS
Status: DISCONTINUED | OUTPATIENT
Start: 2020-01-01 | End: 2020-01-01

## 2020-01-01 RX ORDER — SODIUM CHLORIDE 0.9 % (FLUSH) 0.9 %
10 SYRINGE (ML) INJECTION EVERY 12 HOURS SCHEDULED
Status: DISCONTINUED | OUTPATIENT
Start: 2020-01-01 | End: 2020-01-01 | Stop reason: HOSPADM

## 2020-01-01 RX ORDER — INSULIN GLARGINE 100 [IU]/ML
30 INJECTION, SOLUTION SUBCUTANEOUS NIGHTLY
Status: DISCONTINUED | OUTPATIENT
Start: 2020-01-01 | End: 2020-01-01

## 2020-01-01 RX ORDER — PANTOPRAZOLE SODIUM 40 MG/1
40 TABLET, DELAYED RELEASE ORAL DAILY
COMMUNITY

## 2020-01-01 RX ORDER — ONDANSETRON 2 MG/ML
4 INJECTION INTRAMUSCULAR; INTRAVENOUS EVERY 6 HOURS PRN
Status: DISCONTINUED | OUTPATIENT
Start: 2020-01-01 | End: 2020-01-01 | Stop reason: HOSPADM

## 2020-01-01 RX ORDER — SENNA LEAF EXTRACT 176MG/5ML
10 SYRUP ORAL NIGHTLY
Status: DISCONTINUED | OUTPATIENT
Start: 2020-01-01 | End: 2020-01-01 | Stop reason: HOSPADM

## 2020-01-01 RX ORDER — ACETAMINOPHEN 325 MG/1
650 TABLET ORAL ONCE
Status: COMPLETED | OUTPATIENT
Start: 2020-01-01 | End: 2020-01-01

## 2020-01-01 RX ORDER — SODIUM POLYSTYRENE SULFONATE 15 G/60ML
30 SUSPENSION ORAL; RECTAL ONCE
Status: COMPLETED | OUTPATIENT
Start: 2020-01-01 | End: 2020-01-01

## 2020-01-01 RX ORDER — SULFAMETHOXAZOLE AND TRIMETHOPRIM 800; 160 MG/1; MG/1
1 TABLET ORAL 2 TIMES DAILY
Qty: 14 TABLET | Refills: 0 | Status: SHIPPED | OUTPATIENT
Start: 2020-01-01 | End: 2020-01-01

## 2020-01-01 RX ORDER — ACETAMINOPHEN 325 MG/1
650 TABLET ORAL EVERY 6 HOURS PRN
Status: DISCONTINUED | OUTPATIENT
Start: 2020-01-01 | End: 2020-01-01 | Stop reason: HOSPADM

## 2020-01-01 RX ORDER — DEXTROSE MONOHYDRATE 25 G/50ML
12.5 INJECTION, SOLUTION INTRAVENOUS PRN
Status: DISCONTINUED | OUTPATIENT
Start: 2020-01-01 | End: 2020-01-01 | Stop reason: HOSPADM

## 2020-01-01 RX ORDER — CHLORHEXIDINE GLUCONATE 0.12 MG/ML
15 RINSE ORAL 2 TIMES DAILY
Status: DISCONTINUED | OUTPATIENT
Start: 2020-01-01 | End: 2020-01-01 | Stop reason: HOSPADM

## 2020-01-01 RX ORDER — CASTOR OIL AND BALSAM, PERU 788; 87 MG/G; MG/G
OINTMENT TOPICAL 2 TIMES DAILY
Status: DISCONTINUED | OUTPATIENT
Start: 2020-01-01 | End: 2020-01-01 | Stop reason: HOSPADM

## 2020-01-01 RX ORDER — HEPARIN SODIUM 1000 [USP'U]/ML
1000 INJECTION, SOLUTION INTRAVENOUS; SUBCUTANEOUS
Status: COMPLETED | OUTPATIENT
Start: 2020-01-01 | End: 2020-01-01

## 2020-01-01 RX ORDER — 0.9 % SODIUM CHLORIDE 0.9 %
20 INTRAVENOUS SOLUTION INTRAVENOUS ONCE
Status: DISCONTINUED | OUTPATIENT
Start: 2020-01-01 | End: 2020-01-01

## 2020-01-01 RX ORDER — INSULIN GLARGINE 100 [IU]/ML
60 INJECTION, SOLUTION SUBCUTANEOUS DAILY
Status: DISCONTINUED | OUTPATIENT
Start: 2020-01-01 | End: 2020-01-01

## 2020-01-01 RX ORDER — CEPHALEXIN 500 MG/1
500 CAPSULE ORAL 4 TIMES DAILY
Qty: 28 CAPSULE | Refills: 0 | Status: SHIPPED | OUTPATIENT
Start: 2020-01-01 | End: 2020-01-01

## 2020-01-01 RX ORDER — PANTOPRAZOLE SODIUM 40 MG/1
40 TABLET, DELAYED RELEASE ORAL DAILY
Status: DISCONTINUED | OUTPATIENT
Start: 2020-01-01 | End: 2020-01-01

## 2020-01-01 RX ORDER — HYDROXYCHLOROQUINE SULFATE 200 MG/1
400 TABLET, FILM COATED ORAL EVERY 12 HOURS
Status: COMPLETED | OUTPATIENT
Start: 2020-01-01 | End: 2020-01-01

## 2020-01-01 RX ORDER — POLYETHYLENE GLYCOL 3350 17 G/17G
17 POWDER, FOR SOLUTION ORAL DAILY PRN
Status: DISCONTINUED | OUTPATIENT
Start: 2020-01-01 | End: 2020-01-01 | Stop reason: HOSPADM

## 2020-01-01 RX ORDER — LINACLOTIDE 290 UG/1
290 CAPSULE, GELATIN COATED ORAL DAILY PRN
COMMUNITY
Start: 2020-01-01

## 2020-01-01 RX ORDER — LACTOBACILLUS RHAMNOSUS GG 10B CELL
2 CAPSULE ORAL 2 TIMES DAILY WITH MEALS
Status: DISCONTINUED | OUTPATIENT
Start: 2020-01-01 | End: 2020-01-01 | Stop reason: HOSPADM

## 2020-01-01 RX ORDER — ALBUTEROL SULFATE 90 UG/1
6 AEROSOL, METERED RESPIRATORY (INHALATION) ONCE
Status: COMPLETED | OUTPATIENT
Start: 2020-01-01 | End: 2020-01-01

## 2020-01-01 RX ORDER — LEVOFLOXACIN 5 MG/ML
750 INJECTION, SOLUTION INTRAVENOUS ONCE
Status: COMPLETED | OUTPATIENT
Start: 2020-01-01 | End: 2020-01-01

## 2020-01-01 RX ORDER — PROMETHAZINE HYDROCHLORIDE 25 MG/ML
12.5 INJECTION, SOLUTION INTRAMUSCULAR; INTRAVENOUS ONCE
Status: COMPLETED | OUTPATIENT
Start: 2020-01-01 | End: 2020-01-01

## 2020-01-01 RX ORDER — INSULIN GLARGINE 100 [IU]/ML
50 INJECTION, SOLUTION SUBCUTANEOUS DAILY
Status: DISCONTINUED | OUTPATIENT
Start: 2020-01-01 | End: 2020-01-01

## 2020-01-01 RX ORDER — MECLIZINE HCL 12.5 MG/1
25 TABLET ORAL ONCE
Status: COMPLETED | OUTPATIENT
Start: 2020-01-01 | End: 2020-01-01

## 2020-01-01 RX ORDER — SODIUM CHLORIDE, SODIUM LACTATE, POTASSIUM CHLORIDE, CALCIUM CHLORIDE 600; 310; 30; 20 MG/100ML; MG/100ML; MG/100ML; MG/100ML
INJECTION, SOLUTION INTRAVENOUS CONTINUOUS
Status: DISCONTINUED | OUTPATIENT
Start: 2020-01-01 | End: 2020-01-01

## 2020-01-01 RX ORDER — HEPARIN SODIUM 5000 [USP'U]/ML
5000 INJECTION, SOLUTION INTRAVENOUS; SUBCUTANEOUS EVERY 8 HOURS SCHEDULED
Status: DISCONTINUED | OUTPATIENT
Start: 2020-01-01 | End: 2020-01-01 | Stop reason: HOSPADM

## 2020-01-01 RX ORDER — ASPIRIN 81 MG/1
81 TABLET ORAL DAILY
Status: DISCONTINUED | OUTPATIENT
Start: 2020-01-01 | End: 2020-01-01 | Stop reason: HOSPADM

## 2020-01-01 RX ORDER — POTASSIUM CHLORIDE 29.8 MG/ML
20 INJECTION INTRAVENOUS PRN
Status: DISCONTINUED | OUTPATIENT
Start: 2020-01-01 | End: 2020-01-01 | Stop reason: HOSPADM

## 2020-01-01 RX ADMIN — VASOPRESSIN 0.04 UNITS/MIN: 20 INJECTION INTRAVENOUS at 15:15

## 2020-01-01 RX ADMIN — CHLORHEXIDINE GLUCONATE 15 ML: 1.2 RINSE ORAL at 19:38

## 2020-01-01 RX ADMIN — Medication 2000 ML/HR: at 12:11

## 2020-01-01 RX ADMIN — WHITE PETROLATUM 57.7 %-MINERAL OIL 31.9 % EYE OINTMENT: at 03:45

## 2020-01-01 RX ADMIN — Medication 1000 ML/HR: at 03:06

## 2020-01-01 RX ADMIN — INSULIN LISPRO 2 UNITS: 100 INJECTION, SOLUTION INTRAVENOUS; SUBCUTANEOUS at 08:22

## 2020-01-01 RX ADMIN — INSULIN LISPRO 8 UNITS: 100 INJECTION, SOLUTION INTRAVENOUS; SUBCUTANEOUS at 20:45

## 2020-01-01 RX ADMIN — INSULIN LISPRO 2 UNITS: 100 INJECTION, SOLUTION INTRAVENOUS; SUBCUTANEOUS at 08:13

## 2020-01-01 RX ADMIN — INSULIN LISPRO 3 UNITS: 100 INJECTION, SOLUTION INTRAVENOUS; SUBCUTANEOUS at 10:05

## 2020-01-01 RX ADMIN — DEXTROSE MONOHYDRATE 100 MG: 50 INJECTION, SOLUTION INTRAVENOUS at 11:08

## 2020-01-01 RX ADMIN — Medication 200 MCG/HR: at 03:01

## 2020-01-01 RX ADMIN — PROMETHAZINE HYDROCHLORIDE 12.5 MG: 25 INJECTION INTRAMUSCULAR; INTRAVENOUS at 11:04

## 2020-01-01 RX ADMIN — FAMOTIDINE 20 MG: 10 INJECTION, SOLUTION INTRAVENOUS at 08:22

## 2020-01-01 RX ADMIN — Medication 200 MCG/HR: at 06:55

## 2020-01-01 RX ADMIN — INSULIN LISPRO 6 UNITS: 100 INJECTION, SOLUTION INTRAVENOUS; SUBCUTANEOUS at 04:27

## 2020-01-01 RX ADMIN — COBICISTAT 150 MG: 150 TABLET, FILM COATED ORAL at 15:49

## 2020-01-01 RX ADMIN — MEROPENEM 500 MG: 500 INJECTION, POWDER, FOR SOLUTION INTRAVENOUS at 21:10

## 2020-01-01 RX ADMIN — INSULIN LISPRO 2 UNITS: 100 INJECTION, SOLUTION INTRAVENOUS; SUBCUTANEOUS at 20:14

## 2020-01-01 RX ADMIN — DARUNAVIR 800 MG: 800 TABLET, FILM COATED ORAL at 07:43

## 2020-01-01 RX ADMIN — Medication 1500 ML/HR: at 22:00

## 2020-01-01 RX ADMIN — ENOXAPARIN SODIUM 40 MG: 40 INJECTION SUBCUTANEOUS at 09:43

## 2020-01-01 RX ADMIN — DOCUSATE SODIUM 100 MG: 50 LIQUID ORAL at 08:24

## 2020-01-01 RX ADMIN — Medication 200 MG: at 21:55

## 2020-01-01 RX ADMIN — Medication 1000 ML/HR: at 22:42

## 2020-01-01 RX ADMIN — Medication 2 CAPSULE: at 08:24

## 2020-01-01 RX ADMIN — INSULIN LISPRO 1 UNITS: 100 INJECTION, SOLUTION INTRAVENOUS; SUBCUTANEOUS at 23:55

## 2020-01-01 RX ADMIN — IBUPROFEN 800 MG: 400 TABLET, FILM COATED ORAL at 14:56

## 2020-01-01 RX ADMIN — Medication 2 CAPSULE: at 18:05

## 2020-01-01 RX ADMIN — AZITHROMYCIN DIHYDRATE 250 MG: 500 INJECTION, POWDER, LYOPHILIZED, FOR SOLUTION INTRAVENOUS at 14:12

## 2020-01-01 RX ADMIN — Medication 1000 ML/HR: at 08:08

## 2020-01-01 RX ADMIN — ASPIRIN 81 MG: 81 TABLET, COATED ORAL at 09:41

## 2020-01-01 RX ADMIN — MEROPENEM 500 MG: 500 INJECTION, POWDER, FOR SOLUTION INTRAVENOUS at 16:00

## 2020-01-01 RX ADMIN — FAMOTIDINE 20 MG: 10 INJECTION, SOLUTION INTRAVENOUS at 21:01

## 2020-01-01 RX ADMIN — INSULIN LISPRO 4 UNITS: 100 INJECTION, SOLUTION INTRAVENOUS; SUBCUTANEOUS at 01:28

## 2020-01-01 RX ADMIN — WHITE PETROLATUM 57.7 %-MINERAL OIL 31.9 % EYE OINTMENT: at 21:39

## 2020-01-01 RX ADMIN — Medication 2 CAPSULE: at 16:37

## 2020-01-01 RX ADMIN — Medication 200 MCG/HR: at 14:45

## 2020-01-01 RX ADMIN — Medication 2000 ML/HR: at 15:51

## 2020-01-01 RX ADMIN — INSULIN LISPRO 4 UNITS: 100 INJECTION, SOLUTION INTRAVENOUS; SUBCUTANEOUS at 00:25

## 2020-01-01 RX ADMIN — SODIUM CHLORIDE, PRESERVATIVE FREE 10 ML: 5 INJECTION INTRAVENOUS at 20:30

## 2020-01-01 RX ADMIN — Medication 1500 ML/HR: at 06:50

## 2020-01-01 RX ADMIN — FUROSEMIDE 100 MG: 10 INJECTION, SOLUTION INTRAMUSCULAR; INTRAVENOUS at 01:54

## 2020-01-01 RX ADMIN — HEPARIN SODIUM 25 ML/HR: 5000 INJECTION INTRAVENOUS; SUBCUTANEOUS at 00:25

## 2020-01-01 RX ADMIN — EPINEPHRINE 19 MCG/MIN: 1 INJECTION PARENTERAL at 18:55

## 2020-01-01 RX ADMIN — VANCOMYCIN HYDROCHLORIDE 1500 MG: 10 INJECTION, POWDER, LYOPHILIZED, FOR SOLUTION INTRAVENOUS at 00:50

## 2020-01-01 RX ADMIN — Medication 2 CAPSULE: at 17:16

## 2020-01-01 RX ADMIN — ACETAMINOPHEN 650 MG: 325 TABLET ORAL at 10:00

## 2020-01-01 RX ADMIN — Medication 35 MCG/MIN: at 22:50

## 2020-01-01 RX ADMIN — INSULIN LISPRO 1 UNITS: 100 INJECTION, SOLUTION INTRAVENOUS; SUBCUTANEOUS at 17:45

## 2020-01-01 RX ADMIN — FUROSEMIDE 20 MG: 10 INJECTION, SOLUTION INTRAMUSCULAR; INTRAVENOUS at 18:25

## 2020-01-01 RX ADMIN — Medication 200 MCG/HR: at 04:26

## 2020-01-01 RX ADMIN — COBICISTAT 150 MG: 150 TABLET, FILM COATED ORAL at 08:01

## 2020-01-01 RX ADMIN — WHITE PETROLATUM 57.7 %-MINERAL OIL 31.9 % EYE OINTMENT: at 20:51

## 2020-01-01 RX ADMIN — SODIUM CHLORIDE, PRESERVATIVE FREE 10 ML: 5 INJECTION INTRAVENOUS at 09:33

## 2020-01-01 RX ADMIN — ACETAMINOPHEN 650 MG: 325 TABLET ORAL at 04:28

## 2020-01-01 RX ADMIN — EPINEPHRINE 23 MCG/MIN: 1 INJECTION PARENTERAL at 17:33

## 2020-01-01 RX ADMIN — Medication 1 CAPSULE: at 16:31

## 2020-01-01 RX ADMIN — SODIUM CHLORIDE: 9 INJECTION, SOLUTION INTRAVENOUS at 16:31

## 2020-01-01 RX ADMIN — Medication 2 CAPSULE: at 09:43

## 2020-01-01 RX ADMIN — PROPOFOL 30 MCG/KG/MIN: 10 INJECTION, EMULSION INTRAVENOUS at 08:28

## 2020-01-01 RX ADMIN — WHITE PETROLATUM 57.7 %-MINERAL OIL 31.9 % EYE OINTMENT: at 13:08

## 2020-01-01 RX ADMIN — ASPIRIN 81 MG: 81 TABLET, COATED ORAL at 08:01

## 2020-01-01 RX ADMIN — MUPIROCIN: 20 OINTMENT TOPICAL at 20:44

## 2020-01-01 RX ADMIN — PROPOFOL 20 MCG/KG/MIN: 10 INJECTION, EMULSION INTRAVENOUS at 21:36

## 2020-01-01 RX ADMIN — Medication 40 MCG/MIN: at 03:06

## 2020-01-01 RX ADMIN — WHITE PETROLATUM 57.7 %-MINERAL OIL 31.9 % EYE OINTMENT: at 20:20

## 2020-01-01 RX ADMIN — Medication 100 MCG/HR: at 00:41

## 2020-01-01 RX ADMIN — Medication 1250 MG: at 11:00

## 2020-01-01 RX ADMIN — WHITE PETROLATUM 57.7 %-MINERAL OIL 31.9 % EYE OINTMENT: at 12:00

## 2020-01-01 RX ADMIN — SODIUM CHLORIDE, PRESERVATIVE FREE 10 ML: 5 INJECTION INTRAVENOUS at 21:55

## 2020-01-01 RX ADMIN — HEPARIN SODIUM: 1000 INJECTION INTRAVENOUS; SUBCUTANEOUS at 22:47

## 2020-01-01 RX ADMIN — Medication 5 MCG/MIN: at 17:12

## 2020-01-01 RX ADMIN — FAMOTIDINE 20 MG: 10 INJECTION, SOLUTION INTRAVENOUS at 08:23

## 2020-01-01 RX ADMIN — HEPARIN SODIUM 25 ML/HR: 5000 INJECTION INTRAVENOUS; SUBCUTANEOUS at 20:15

## 2020-01-01 RX ADMIN — Medication 200 MCG/HR: at 14:34

## 2020-01-01 RX ADMIN — Medication 200 MCG/HR: at 11:45

## 2020-01-01 RX ADMIN — FAMOTIDINE 20 MG: 10 INJECTION, SOLUTION INTRAVENOUS at 20:43

## 2020-01-01 RX ADMIN — VANCOMYCIN HYDROCHLORIDE 1500 MG: 10 INJECTION, POWDER, LYOPHILIZED, FOR SOLUTION INTRAVENOUS at 18:55

## 2020-01-01 RX ADMIN — Medication 175 MCG/HR: at 11:29

## 2020-01-01 RX ADMIN — DIPHENHYDRAMINE HYDROCHLORIDE 12.5 MG: 50 INJECTION, SOLUTION INTRAMUSCULAR; INTRAVENOUS at 11:39

## 2020-01-01 RX ADMIN — FAMOTIDINE 20 MG: 10 INJECTION, SOLUTION INTRAVENOUS at 08:25

## 2020-01-01 RX ADMIN — ACETAMINOPHEN 1000 MG: 500 TABLET, FILM COATED ORAL at 12:46

## 2020-01-01 RX ADMIN — VANCOMYCIN HYDROCHLORIDE 1500 MG: 5 INJECTION, POWDER, LYOPHILIZED, FOR SOLUTION INTRAVENOUS at 22:53

## 2020-01-01 RX ADMIN — HYDROXYCHLOROQUINE SULFATE 400 MG: 200 TABLET ORAL at 09:41

## 2020-01-01 RX ADMIN — Medication 200 MG: at 20:01

## 2020-01-01 RX ADMIN — CEFEPIME 2 G: 2 INJECTION, POWDER, FOR SOLUTION INTRAVENOUS at 04:27

## 2020-01-01 RX ADMIN — PROPOFOL 10 MCG/KG/MIN: 10 INJECTION, EMULSION INTRAVENOUS at 01:18

## 2020-01-01 RX ADMIN — INSULIN LISPRO 2 UNITS: 100 INJECTION, SOLUTION INTRAVENOUS; SUBCUTANEOUS at 05:30

## 2020-01-01 RX ADMIN — Medication 2 CAPSULE: at 09:09

## 2020-01-01 RX ADMIN — WHITE PETROLATUM 57.7 %-MINERAL OIL 31.9 % EYE OINTMENT: at 19:55

## 2020-01-01 RX ADMIN — COBICISTAT 150 MG: 150 TABLET, FILM COATED ORAL at 08:24

## 2020-01-01 RX ADMIN — SODIUM CHLORIDE, PRESERVATIVE FREE 10 ML: 5 INJECTION INTRAVENOUS at 10:00

## 2020-01-01 RX ADMIN — IOPAMIDOL 75 ML: 755 INJECTION, SOLUTION INTRAVENOUS at 12:06

## 2020-01-01 RX ADMIN — MUPIROCIN: 20 OINTMENT TOPICAL at 08:25

## 2020-01-01 RX ADMIN — CEFEPIME 2 G: 2 INJECTION, POWDER, FOR SOLUTION INTRAVENOUS at 16:11

## 2020-01-01 RX ADMIN — CHLORHEXIDINE GLUCONATE 15 ML: 1.2 RINSE ORAL at 20:59

## 2020-01-01 RX ADMIN — COBICISTAT 150 MG: 150 TABLET, FILM COATED ORAL at 08:20

## 2020-01-01 RX ADMIN — AZITHROMYCIN MONOHYDRATE 500 MG: 500 INJECTION, POWDER, LYOPHILIZED, FOR SOLUTION INTRAVENOUS at 13:00

## 2020-01-01 RX ADMIN — Medication 2 CAPSULE: at 15:49

## 2020-01-01 RX ADMIN — SODIUM CHLORIDE, PRESERVATIVE FREE 10 ML: 5 INJECTION INTRAVENOUS at 20:52

## 2020-01-01 RX ADMIN — HEPARIN SODIUM 5000 UNITS: 5000 INJECTION INTRAVENOUS; SUBCUTANEOUS at 22:29

## 2020-01-01 RX ADMIN — VASOPRESSIN 0.04 UNITS/MIN: 20 INJECTION INTRAVENOUS at 20:49

## 2020-01-01 RX ADMIN — INSULIN GLARGINE 60 UNITS: 100 INJECTION, SOLUTION SUBCUTANEOUS at 09:40

## 2020-01-01 RX ADMIN — HEPARIN SODIUM 5000 UNITS: 5000 INJECTION INTRAVENOUS; SUBCUTANEOUS at 20:57

## 2020-01-01 RX ADMIN — Medication 50 MCG/HR: at 10:30

## 2020-01-01 RX ADMIN — MEROPENEM 500 MG: 500 INJECTION, POWDER, FOR SOLUTION INTRAVENOUS at 21:36

## 2020-01-01 RX ADMIN — SODIUM CHLORIDE, PRESERVATIVE FREE 10 ML: 5 INJECTION INTRAVENOUS at 08:33

## 2020-01-01 RX ADMIN — COBICISTAT 150 MG: 150 TABLET, FILM COATED ORAL at 07:43

## 2020-01-01 RX ADMIN — AZITHROMYCIN DIHYDRATE 250 MG: 500 INJECTION, POWDER, LYOPHILIZED, FOR SOLUTION INTRAVENOUS at 16:42

## 2020-01-01 RX ADMIN — ACETAMINOPHEN 650 MG: 325 TABLET ORAL at 20:03

## 2020-01-01 RX ADMIN — TOCILIZUMAB 400 MG: 20 INJECTION, SOLUTION, CONCENTRATE INTRAVENOUS at 16:37

## 2020-01-01 RX ADMIN — Medication 2000 ML/HR: at 01:30

## 2020-01-01 RX ADMIN — MEROPENEM 500 MG: 500 INJECTION, POWDER, FOR SOLUTION INTRAVENOUS at 16:38

## 2020-01-01 RX ADMIN — SODIUM CHLORIDE, PRESERVATIVE FREE 10 ML: 5 INJECTION INTRAVENOUS at 22:54

## 2020-01-01 RX ADMIN — HEPARIN SODIUM 5000 UNITS: 5000 INJECTION INTRAVENOUS; SUBCUTANEOUS at 14:12

## 2020-01-01 RX ADMIN — CHLORHEXIDINE GLUCONATE 15 ML: 1.2 RINSE ORAL at 08:24

## 2020-01-01 RX ADMIN — PANTOPRAZOLE SODIUM 40 MG: 40 TABLET, DELAYED RELEASE ORAL at 09:41

## 2020-01-01 RX ADMIN — MUPIROCIN: 20 OINTMENT TOPICAL at 09:47

## 2020-01-01 RX ADMIN — Medication 35 MCG/MIN: at 14:00

## 2020-01-01 RX ADMIN — SODIUM CHLORIDE, PRESERVATIVE FREE 10 ML: 5 INJECTION INTRAVENOUS at 21:36

## 2020-01-01 RX ADMIN — MEROPENEM 500 MG: 500 INJECTION, POWDER, FOR SOLUTION INTRAVENOUS at 22:00

## 2020-01-01 RX ADMIN — CHLORHEXIDINE GLUCONATE 15 ML: 1.2 RINSE ORAL at 22:03

## 2020-01-01 RX ADMIN — MEROPENEM 500 MG: 500 INJECTION, POWDER, FOR SOLUTION INTRAVENOUS at 04:50

## 2020-01-01 RX ADMIN — Medication 200 MCG/HR: at 01:50

## 2020-01-01 RX ADMIN — ENOXAPARIN SODIUM 40 MG: 40 INJECTION SUBCUTANEOUS at 08:24

## 2020-01-01 RX ADMIN — MECLIZINE 25 MG: 12.5 TABLET ORAL at 11:04

## 2020-01-01 RX ADMIN — DOCUSATE SODIUM 100 MG: 50 LIQUID ORAL at 08:25

## 2020-01-01 RX ADMIN — HEPARIN SODIUM 5000 UNITS: 5000 INJECTION INTRAVENOUS; SUBCUTANEOUS at 04:49

## 2020-01-01 RX ADMIN — MUPIROCIN: 20 OINTMENT TOPICAL at 08:00

## 2020-01-01 RX ADMIN — Medication 20 MEQ: at 10:48

## 2020-01-01 RX ADMIN — MUPIROCIN: 20 OINTMENT TOPICAL at 21:05

## 2020-01-01 RX ADMIN — WHITE PETROLATUM 57.7 %-MINERAL OIL 31.9 % EYE OINTMENT: at 04:16

## 2020-01-01 RX ADMIN — Medication 100 MCG/HR: at 06:29

## 2020-01-01 RX ADMIN — CALCIUM GLUCONATE 1 G: 20 INJECTION, SOLUTION INTRAVENOUS at 12:11

## 2020-01-01 RX ADMIN — CHLORHEXIDINE GLUCONATE 15 ML: 1.2 RINSE ORAL at 07:35

## 2020-01-01 RX ADMIN — INSULIN GLARGINE 50 UNITS: 100 INJECTION, SOLUTION SUBCUTANEOUS at 07:32

## 2020-01-01 RX ADMIN — CHLORHEXIDINE GLUCONATE 15 ML: 1.2 RINSE ORAL at 20:34

## 2020-01-01 RX ADMIN — Medication 100 MCG/HR: at 20:30

## 2020-01-01 RX ADMIN — Medication 2 CAPSULE: at 16:52

## 2020-01-01 RX ADMIN — EPINEPHRINE 28 MCG/MIN: 1 INJECTION PARENTERAL at 06:14

## 2020-01-01 RX ADMIN — EPINEPHRINE 16 MCG/MIN: 1 INJECTION PARENTERAL at 21:51

## 2020-01-01 RX ADMIN — MUPIROCIN: 20 OINTMENT TOPICAL at 12:17

## 2020-01-01 RX ADMIN — ROSUVASTATIN CALCIUM 10 MG: 10 TABLET, FILM COATED ORAL at 09:41

## 2020-01-01 RX ADMIN — VASOPRESSIN 0.04 UNITS/MIN: 20 INJECTION INTRAVENOUS at 11:47

## 2020-01-01 RX ADMIN — MEROPENEM 500 MG: 500 INJECTION, POWDER, FOR SOLUTION INTRAVENOUS at 05:00

## 2020-01-01 RX ADMIN — MEROPENEM 500 MG: 500 INJECTION, POWDER, FOR SOLUTION INTRAVENOUS at 18:32

## 2020-01-01 RX ADMIN — DEXTROSE MONOHYDRATE 100 MG: 50 INJECTION, SOLUTION INTRAVENOUS at 10:20

## 2020-01-01 RX ADMIN — Medication 2 CAPSULE: at 18:12

## 2020-01-01 RX ADMIN — EPINEPHRINE 26 MCG/MIN: 1 INJECTION PARENTERAL at 02:47

## 2020-01-01 RX ADMIN — CALCIUM GLUCONATE 1 G: 20 INJECTION, SOLUTION INTRAVENOUS at 03:07

## 2020-01-01 RX ADMIN — MUPIROCIN: 20 OINTMENT TOPICAL at 21:38

## 2020-01-01 RX ADMIN — Medication 30 MCG/MIN: at 05:22

## 2020-01-01 RX ADMIN — SODIUM CHLORIDE, POTASSIUM CHLORIDE, SODIUM LACTATE AND CALCIUM CHLORIDE 1000 ML: 600; 310; 30; 20 INJECTION, SOLUTION INTRAVENOUS at 13:13

## 2020-01-01 RX ADMIN — Medication 25 G: at 02:05

## 2020-01-01 RX ADMIN — HEPARIN SODIUM 5000 UNITS: 5000 INJECTION INTRAVENOUS; SUBCUTANEOUS at 05:21

## 2020-01-01 RX ADMIN — CHLORHEXIDINE GLUCONATE 15 ML: 1.2 RINSE ORAL at 20:50

## 2020-01-01 RX ADMIN — SERTRALINE HYDROCHLORIDE 25 MG: 25 TABLET ORAL at 08:20

## 2020-01-01 RX ADMIN — Medication 2000 ML/HR: at 08:08

## 2020-01-01 RX ADMIN — PROPOFOL 30 MCG/KG/MIN: 10 INJECTION, EMULSION INTRAVENOUS at 14:56

## 2020-01-01 RX ADMIN — Medication 2000 ML/HR: at 15:11

## 2020-01-01 RX ADMIN — ACETAMINOPHEN 650 MG: 325 TABLET ORAL at 09:09

## 2020-01-01 RX ADMIN — Medication 2 CAPSULE: at 08:00

## 2020-01-01 RX ADMIN — INSULIN LISPRO 10 UNITS: 100 INJECTION, SOLUTION INTRAVENOUS; SUBCUTANEOUS at 13:41

## 2020-01-01 RX ADMIN — VASOPRESSIN 0.04 UNITS/MIN: 20 INJECTION INTRAVENOUS at 21:04

## 2020-01-01 RX ADMIN — HEPARIN SODIUM 1000 UNITS: 1000 INJECTION INTRAVENOUS; SUBCUTANEOUS at 01:54

## 2020-01-01 RX ADMIN — WHITE PETROLATUM 57.7 %-MINERAL OIL 31.9 % EYE OINTMENT: at 16:00

## 2020-01-01 RX ADMIN — Medication 200 MG: at 09:59

## 2020-01-01 RX ADMIN — INSULIN LISPRO 2 UNITS: 100 INJECTION, SOLUTION INTRAVENOUS; SUBCUTANEOUS at 05:39

## 2020-01-01 RX ADMIN — MEROPENEM 500 MG: 500 INJECTION, POWDER, FOR SOLUTION INTRAVENOUS at 05:12

## 2020-01-01 RX ADMIN — Medication 1000 ML/HR: at 12:43

## 2020-01-01 RX ADMIN — FAMOTIDINE 20 MG: 10 INJECTION, SOLUTION INTRAVENOUS at 11:39

## 2020-01-01 RX ADMIN — Medication 1000 ML/HR: at 23:59

## 2020-01-01 RX ADMIN — INSULIN GLARGINE 30 UNITS: 100 INJECTION, SOLUTION SUBCUTANEOUS at 20:14

## 2020-01-01 RX ADMIN — ALTEPLASE 1 MG: 2.2 INJECTION, POWDER, LYOPHILIZED, FOR SOLUTION INTRAVENOUS at 00:20

## 2020-01-01 RX ADMIN — PROPOFOL 10 MCG/KG/MIN: 10 INJECTION, EMULSION INTRAVENOUS at 23:49

## 2020-01-01 RX ADMIN — Medication 100 MCG/HR: at 14:52

## 2020-01-01 RX ADMIN — MUPIROCIN: 20 OINTMENT TOPICAL at 20:01

## 2020-01-01 RX ADMIN — Medication 200 MCG/HR: at 18:13

## 2020-01-01 RX ADMIN — CASTOR OIL AND BALSAM, PERU: 788; 87 OINTMENT TOPICAL at 14:02

## 2020-01-01 RX ADMIN — DARUNAVIR 800 MG: 800 TABLET, FILM COATED ORAL at 09:50

## 2020-01-01 RX ADMIN — MUPIROCIN: 20 OINTMENT TOPICAL at 08:26

## 2020-01-01 RX ADMIN — CEFEPIME 2 G: 2 INJECTION, POWDER, FOR SOLUTION INTRAVENOUS at 03:49

## 2020-01-01 RX ADMIN — HYDROCODONE BITARTRATE AND ACETAMINOPHEN 10 ML: 176/5 SOLUTION ORAL at 20:53

## 2020-01-01 RX ADMIN — HYDROCODONE BITARTRATE AND ACETAMINOPHEN 10 ML: 176/5 SOLUTION ORAL at 20:01

## 2020-01-01 RX ADMIN — Medication 1000 ML/HR: at 02:55

## 2020-01-01 RX ADMIN — SODIUM POLYSTYRENE SULFONATE 30 G: 15 SUSPENSION ORAL; RECTAL at 06:02

## 2020-01-01 RX ADMIN — Medication 2 CAPSULE: at 17:14

## 2020-01-01 RX ADMIN — CHLORHEXIDINE GLUCONATE 15 ML: 1.2 RINSE ORAL at 20:44

## 2020-01-01 RX ADMIN — VASOPRESSIN 0.04 UNITS/MIN: 20 INJECTION INTRAVENOUS at 13:48

## 2020-01-01 RX ADMIN — WHITE PETROLATUM 57.7 %-MINERAL OIL 31.9 % EYE OINTMENT: at 15:56

## 2020-01-01 RX ADMIN — CHLORHEXIDINE GLUCONATE 15 ML: 1.2 RINSE ORAL at 20:30

## 2020-01-01 RX ADMIN — SODIUM CHLORIDE, PRESERVATIVE FREE 10 ML: 5 INJECTION INTRAVENOUS at 22:33

## 2020-01-01 RX ADMIN — Medication 6 PUFF: at 04:47

## 2020-01-01 RX ADMIN — FAMOTIDINE 20 MG: 10 INJECTION, SOLUTION INTRAVENOUS at 21:55

## 2020-01-01 RX ADMIN — MEROPENEM 500 MG: 500 INJECTION, POWDER, FOR SOLUTION INTRAVENOUS at 05:05

## 2020-01-01 RX ADMIN — MEROPENEM 500 MG: 500 INJECTION, POWDER, FOR SOLUTION INTRAVENOUS at 11:07

## 2020-01-01 RX ADMIN — Medication 200 MG: at 09:50

## 2020-01-01 RX ADMIN — INSULIN LISPRO 1 UNITS: 100 INJECTION, SOLUTION INTRAVENOUS; SUBCUTANEOUS at 21:05

## 2020-01-01 RX ADMIN — CEFEPIME 2 G: 2 INJECTION, POWDER, FOR SOLUTION INTRAVENOUS at 16:31

## 2020-01-01 RX ADMIN — MEROPENEM 500 MG: 500 INJECTION, POWDER, FOR SOLUTION INTRAVENOUS at 10:59

## 2020-01-01 RX ADMIN — INSULIN LISPRO 6 UNITS: 100 INJECTION, SOLUTION INTRAVENOUS; SUBCUTANEOUS at 01:51

## 2020-01-01 RX ADMIN — ROSUVASTATIN CALCIUM 10 MG: 10 TABLET, FILM COATED ORAL at 09:09

## 2020-01-01 RX ADMIN — SODIUM CHLORIDE, PRESERVATIVE FREE 10 ML: 5 INJECTION INTRAVENOUS at 09:20

## 2020-01-01 RX ADMIN — DEXTROSE MONOHYDRATE 25 G: 25 INJECTION, SOLUTION INTRAVENOUS at 10:31

## 2020-01-01 RX ADMIN — Medication 1500 ML/HR: at 09:30

## 2020-01-01 RX ADMIN — WHITE PETROLATUM 57.7 %-MINERAL OIL 31.9 % EYE OINTMENT: at 04:30

## 2020-01-01 RX ADMIN — SODIUM CHLORIDE, PRESERVATIVE FREE 10 ML: 5 INJECTION INTRAVENOUS at 20:48

## 2020-01-01 RX ADMIN — WHITE PETROLATUM 57.7 %-MINERAL OIL 31.9 % EYE OINTMENT: at 00:06

## 2020-01-01 RX ADMIN — Medication 1000 ML/HR: at 09:59

## 2020-01-01 RX ADMIN — Medication 50 MCG/MIN: at 21:20

## 2020-01-01 RX ADMIN — HYDROCODONE BITARTRATE AND ACETAMINOPHEN 10 ML: 176/5 SOLUTION ORAL at 20:43

## 2020-01-01 RX ADMIN — ASPIRIN 81 MG: 81 TABLET, COATED ORAL at 08:20

## 2020-01-01 RX ADMIN — SODIUM CHLORIDE, PRESERVATIVE FREE 10 ML: 5 INJECTION INTRAVENOUS at 09:42

## 2020-01-01 RX ADMIN — ENOXAPARIN SODIUM 40 MG: 40 INJECTION SUBCUTANEOUS at 09:40

## 2020-01-01 RX ADMIN — PROPOFOL 30 MCG/KG/MIN: 10 INJECTION, EMULSION INTRAVENOUS at 20:20

## 2020-01-01 RX ADMIN — MEROPENEM 500 MG: 500 INJECTION, POWDER, FOR SOLUTION INTRAVENOUS at 15:30

## 2020-01-01 RX ADMIN — ALTEPLASE 1 MG: 2.2 INJECTION, POWDER, LYOPHILIZED, FOR SOLUTION INTRAVENOUS at 13:41

## 2020-01-01 RX ADMIN — INSULIN LISPRO 10 UNITS: 100 INJECTION, SOLUTION INTRAVENOUS; SUBCUTANEOUS at 16:45

## 2020-01-01 RX ADMIN — ACETAMINOPHEN 650 MG: 325 TABLET ORAL at 07:31

## 2020-01-01 RX ADMIN — MEROPENEM 500 MG: 500 INJECTION, POWDER, FOR SOLUTION INTRAVENOUS at 04:17

## 2020-01-01 RX ADMIN — HYDROXYCHLOROQUINE SULFATE 400 MG: 200 TABLET ORAL at 22:32

## 2020-01-01 RX ADMIN — SODIUM BICARBONATE: 84 INJECTION, SOLUTION INTRAVENOUS at 01:56

## 2020-01-01 RX ADMIN — DARUNAVIR 800 MG: 800 TABLET, FILM COATED ORAL at 15:49

## 2020-01-01 RX ADMIN — MEROPENEM 500 MG: 500 INJECTION, POWDER, FOR SOLUTION INTRAVENOUS at 04:20

## 2020-01-01 RX ADMIN — Medication 500 ML/HR: at 17:50

## 2020-01-01 RX ADMIN — SODIUM CHLORIDE, PRESERVATIVE FREE 10 ML: 5 INJECTION INTRAVENOUS at 07:34

## 2020-01-01 RX ADMIN — POLYETHYLENE GLYCOL 3350 17 G: 17 POWDER, FOR SOLUTION ORAL at 08:25

## 2020-01-01 RX ADMIN — MAGNESIUM SULFATE HEPTAHYDRATE 2 G: 40 INJECTION, SOLUTION INTRAVENOUS at 11:24

## 2020-01-01 RX ADMIN — EPINEPHRINE 10 MCG/MIN: 1 INJECTION PARENTERAL at 01:57

## 2020-01-01 RX ADMIN — Medication 2000 ML/HR: at 09:59

## 2020-01-01 RX ADMIN — ASPIRIN 81 MG: 81 TABLET, COATED ORAL at 09:43

## 2020-01-01 RX ADMIN — VASOPRESSIN 0.04 UNITS/MIN: 20 INJECTION INTRAVENOUS at 23:43

## 2020-01-01 RX ADMIN — Medication 100 MCG/HR: at 16:23

## 2020-01-01 RX ADMIN — MUPIROCIN: 20 OINTMENT TOPICAL at 20:30

## 2020-01-01 RX ADMIN — POLYETHYLENE GLYCOL 3350 17 G: 17 POWDER, FOR SOLUTION ORAL at 09:30

## 2020-01-01 RX ADMIN — SODIUM CHLORIDE 1000 ML: 9 INJECTION, SOLUTION INTRAVENOUS at 18:27

## 2020-01-01 RX ADMIN — PROPOFOL 30 MCG/KG/MIN: 10 INJECTION, EMULSION INTRAVENOUS at 17:00

## 2020-01-01 RX ADMIN — SODIUM CHLORIDE 2 MCG/KG/MIN: 9 INJECTION, SOLUTION INTRAVENOUS at 13:18

## 2020-01-01 RX ADMIN — SODIUM CHLORIDE: 9 INJECTION, SOLUTION INTRAVENOUS at 08:40

## 2020-01-01 RX ADMIN — SODIUM CHLORIDE, PRESERVATIVE FREE 10 ML: 5 INJECTION INTRAVENOUS at 08:26

## 2020-01-01 RX ADMIN — CHLORHEXIDINE GLUCONATE 15 ML: 1.2 RINSE ORAL at 12:15

## 2020-01-01 RX ADMIN — CALCIUM GLUCONATE 1 G: 20 INJECTION, SOLUTION INTRAVENOUS at 07:29

## 2020-01-01 RX ADMIN — WHITE PETROLATUM 57.7 %-MINERAL OIL 31.9 % EYE OINTMENT: at 08:42

## 2020-01-01 RX ADMIN — FAMOTIDINE 20 MG: 10 INJECTION, SOLUTION INTRAVENOUS at 08:20

## 2020-01-01 RX ADMIN — Medication 200 MG: at 20:59

## 2020-01-01 RX ADMIN — CALCIUM GLUCONATE 1 G: 20 INJECTION, SOLUTION INTRAVENOUS at 09:53

## 2020-01-01 RX ADMIN — SERTRALINE HYDROCHLORIDE 25 MG: 25 TABLET ORAL at 07:31

## 2020-01-01 RX ADMIN — Medication 1000 ML/HR: at 06:50

## 2020-01-01 RX ADMIN — Medication 1000 ML/HR: at 21:10

## 2020-01-01 RX ADMIN — SODIUM CHLORIDE 1000 ML: 9 INJECTION, SOLUTION INTRAVENOUS at 20:41

## 2020-01-01 RX ADMIN — MUPIROCIN: 20 OINTMENT TOPICAL at 09:50

## 2020-01-01 RX ADMIN — CEFEPIME 2 G: 2 INJECTION, POWDER, FOR SOLUTION INTRAVENOUS at 05:16

## 2020-01-01 RX ADMIN — CALCIUM GLUCONATE 1 G: 20 INJECTION, SOLUTION INTRAVENOUS at 06:45

## 2020-01-01 RX ADMIN — SERTRALINE HYDROCHLORIDE 25 MG: 25 TABLET ORAL at 09:41

## 2020-01-01 RX ADMIN — VASOPRESSIN 0.04 UNITS/MIN: 20 INJECTION INTRAVENOUS at 05:47

## 2020-01-01 RX ADMIN — Medication 1000 ML/HR: at 15:51

## 2020-01-01 RX ADMIN — SODIUM CHLORIDE, PRESERVATIVE FREE 10 ML: 5 INJECTION INTRAVENOUS at 20:59

## 2020-01-01 RX ADMIN — HEPARIN SODIUM 25 ML/HR: 5000 INJECTION INTRAVENOUS; SUBCUTANEOUS at 11:00

## 2020-01-01 RX ADMIN — Medication 200 MCG/HR: at 09:27

## 2020-01-01 RX ADMIN — CEFEPIME 2 G: 2 INJECTION, POWDER, FOR SOLUTION INTRAVENOUS at 17:12

## 2020-01-01 RX ADMIN — WHITE PETROLATUM 57.7 %-MINERAL OIL 31.9 % EYE OINTMENT: at 04:00

## 2020-01-01 RX ADMIN — DOCUSATE SODIUM 100 MG: 50 LIQUID ORAL at 09:30

## 2020-01-01 RX ADMIN — HEPARIN SODIUM 25 ML/HR: 5000 INJECTION INTRAVENOUS; SUBCUTANEOUS at 01:02

## 2020-01-01 RX ADMIN — MEROPENEM 500 MG: 500 INJECTION, POWDER, FOR SOLUTION INTRAVENOUS at 08:59

## 2020-01-01 RX ADMIN — WHITE PETROLATUM 57.7 %-MINERAL OIL 31.9 % EYE OINTMENT: at 08:00

## 2020-01-01 RX ADMIN — MEROPENEM 500 MG: 500 INJECTION, POWDER, FOR SOLUTION INTRAVENOUS at 10:05

## 2020-01-01 RX ADMIN — HEPARIN SODIUM 5000 UNITS: 5000 INJECTION INTRAVENOUS; SUBCUTANEOUS at 06:03

## 2020-01-01 RX ADMIN — SODIUM CHLORIDE: 9 INJECTION, SOLUTION INTRAVENOUS at 22:53

## 2020-01-01 RX ADMIN — ASPIRIN 81 MG: 81 TABLET, COATED ORAL at 08:25

## 2020-01-01 RX ADMIN — DARUNAVIR 800 MG: 800 TABLET, FILM COATED ORAL at 08:01

## 2020-01-01 RX ADMIN — Medication 40 MCG/MIN: at 20:21

## 2020-01-01 RX ADMIN — WHITE PETROLATUM 57.7 %-MINERAL OIL 31.9 % EYE OINTMENT: at 04:02

## 2020-01-01 RX ADMIN — Medication 2000 ML/HR: at 06:25

## 2020-01-01 RX ADMIN — HYDROCODONE BITARTRATE AND ACETAMINOPHEN 10 ML: 176/5 SOLUTION ORAL at 20:58

## 2020-01-01 RX ADMIN — INSULIN GLARGINE 30 UNITS: 100 INJECTION, SOLUTION SUBCUTANEOUS at 20:58

## 2020-01-01 RX ADMIN — DOCUSATE SODIUM 100 MG: 50 LIQUID ORAL at 09:49

## 2020-01-01 RX ADMIN — HEPARIN SODIUM 5000 UNITS: 5000 INJECTION INTRAVENOUS; SUBCUTANEOUS at 22:02

## 2020-01-01 RX ADMIN — Medication 500 ML/HR: at 16:35

## 2020-01-01 RX ADMIN — SODIUM CHLORIDE, PRESERVATIVE FREE 10 ML: 5 INJECTION INTRAVENOUS at 21:21

## 2020-01-01 RX ADMIN — FAMOTIDINE 20 MG: 10 INJECTION, SOLUTION INTRAVENOUS at 09:44

## 2020-01-01 RX ADMIN — SERTRALINE HYDROCHLORIDE 25 MG: 25 TABLET ORAL at 07:43

## 2020-01-01 RX ADMIN — ROSUVASTATIN CALCIUM 10 MG: 10 TABLET, FILM COATED ORAL at 09:43

## 2020-01-01 RX ADMIN — INSULIN LISPRO 2 UNITS: 100 INJECTION, SOLUTION INTRAVENOUS; SUBCUTANEOUS at 00:05

## 2020-01-01 RX ADMIN — AZITHROMYCIN DIHYDRATE 250 MG: 500 INJECTION, POWDER, LYOPHILIZED, FOR SOLUTION INTRAVENOUS at 13:48

## 2020-01-01 RX ADMIN — SODIUM CHLORIDE: 9 INJECTION, SOLUTION INTRAVENOUS at 00:47

## 2020-01-01 RX ADMIN — WHITE PETROLATUM 57.7 %-MINERAL OIL 31.9 % EYE OINTMENT: at 00:15

## 2020-01-01 RX ADMIN — FUROSEMIDE 20 MG: 10 INJECTION, SOLUTION INTRAMUSCULAR; INTRAVENOUS at 10:59

## 2020-01-01 RX ADMIN — DEXTROSE MONOHYDRATE 12.5 G: 500 INJECTION PARENTERAL at 21:07

## 2020-01-01 RX ADMIN — SODIUM CHLORIDE, PRESERVATIVE FREE 10 ML: 5 INJECTION INTRAVENOUS at 07:43

## 2020-01-01 RX ADMIN — INSULIN HUMAN 10 UNITS: 100 INJECTION, SOLUTION PARENTERAL at 10:32

## 2020-01-01 RX ADMIN — EPINEPHRINE 30 MCG/MIN: 1 INJECTION PARENTERAL at 20:33

## 2020-01-01 RX ADMIN — PHENYLEPHRINE HYDROCHLORIDE 100 MCG/MIN: 10 INJECTION INTRAVENOUS at 05:11

## 2020-01-01 RX ADMIN — Medication 1500 ML/HR: at 16:00

## 2020-01-01 RX ADMIN — CALCIUM GLUCONATE 1 G: 20 INJECTION, SOLUTION INTRAVENOUS at 18:24

## 2020-01-01 RX ADMIN — Medication 2 CAPSULE: at 09:30

## 2020-01-01 RX ADMIN — MEROPENEM 500 MG: 500 INJECTION, POWDER, FOR SOLUTION INTRAVENOUS at 04:00

## 2020-01-01 RX ADMIN — POLYETHYLENE GLYCOL 3350 17 G: 17 POWDER, FOR SOLUTION ORAL at 09:50

## 2020-01-01 RX ADMIN — POLYETHYLENE GLYCOL 3350 17 G: 17 POWDER, FOR SOLUTION ORAL at 08:01

## 2020-01-01 RX ADMIN — Medication 2000 ML/HR: at 00:40

## 2020-01-01 RX ADMIN — CEFEPIME 2 G: 2 INJECTION, POWDER, FOR SOLUTION INTRAVENOUS at 16:03

## 2020-01-01 RX ADMIN — ENOXAPARIN SODIUM 40 MG: 40 INJECTION SUBCUTANEOUS at 07:31

## 2020-01-01 RX ADMIN — Medication 500 ML/HR: at 16:00

## 2020-01-01 RX ADMIN — ENOXAPARIN SODIUM 40 MG: 40 INJECTION SUBCUTANEOUS at 08:22

## 2020-01-01 RX ADMIN — PROPOFOL 30 MCG/KG/MIN: 10 INJECTION, EMULSION INTRAVENOUS at 00:57

## 2020-01-01 RX ADMIN — Medication 35 MCG/MIN: at 14:18

## 2020-01-01 RX ADMIN — SERTRALINE HYDROCHLORIDE 25 MG: 25 TABLET ORAL at 08:24

## 2020-01-01 RX ADMIN — MEROPENEM 500 MG: 500 INJECTION, POWDER, FOR SOLUTION INTRAVENOUS at 10:03

## 2020-01-01 RX ADMIN — ROSUVASTATIN CALCIUM 10 MG: 10 TABLET, FILM COATED ORAL at 08:25

## 2020-01-01 RX ADMIN — MEROPENEM 500 MG: 500 INJECTION, POWDER, FOR SOLUTION INTRAVENOUS at 22:25

## 2020-01-01 RX ADMIN — INSULIN LISPRO 4 UNITS: 100 INJECTION, SOLUTION INTRAVENOUS; SUBCUTANEOUS at 12:49

## 2020-01-01 RX ADMIN — WHITE PETROLATUM 57.7 %-MINERAL OIL 31.9 % EYE OINTMENT: at 07:35

## 2020-01-01 RX ADMIN — CHLORHEXIDINE GLUCONATE 15 ML: 1.2 RINSE ORAL at 21:59

## 2020-01-01 RX ADMIN — SERTRALINE HYDROCHLORIDE 25 MG: 25 TABLET ORAL at 09:44

## 2020-01-01 RX ADMIN — PROPOFOL 20 MCG/KG/MIN: 10 INJECTION, EMULSION INTRAVENOUS at 17:08

## 2020-01-01 RX ADMIN — Medication 35 MCG/MIN: at 06:03

## 2020-01-01 RX ADMIN — EPINEPHRINE 30 MCG/MIN: 1 INJECTION PARENTERAL at 02:44

## 2020-01-01 RX ADMIN — FAMOTIDINE 20 MG: 10 INJECTION, SOLUTION INTRAVENOUS at 08:01

## 2020-01-01 RX ADMIN — WHITE PETROLATUM 57.7 %-MINERAL OIL 31.9 % EYE OINTMENT: at 00:44

## 2020-01-01 RX ADMIN — Medication 100 MCG/HR: at 21:00

## 2020-01-01 RX ADMIN — SODIUM CHLORIDE, PRESERVATIVE FREE 10 ML: 5 INJECTION INTRAVENOUS at 20:01

## 2020-01-01 RX ADMIN — Medication 1000 ML/HR: at 22:03

## 2020-01-01 RX ADMIN — Medication 50 MCG/HR: at 08:13

## 2020-01-01 RX ADMIN — INSULIN LISPRO 2 UNITS: 100 INJECTION, SOLUTION INTRAVENOUS; SUBCUTANEOUS at 08:39

## 2020-01-01 RX ADMIN — Medication 50 MCG/MIN: at 02:44

## 2020-01-01 RX ADMIN — ENOXAPARIN SODIUM 40 MG: 40 INJECTION SUBCUTANEOUS at 09:29

## 2020-01-01 RX ADMIN — HEPARIN SODIUM 25 ML/HR: 5000 INJECTION INTRAVENOUS; SUBCUTANEOUS at 17:00

## 2020-01-01 RX ADMIN — EPINEPHRINE 2 MCG/MIN: 1 INJECTION PARENTERAL at 14:33

## 2020-01-01 RX ADMIN — INSULIN LISPRO 2 UNITS: 100 INJECTION, SOLUTION INTRAVENOUS; SUBCUTANEOUS at 09:00

## 2020-01-01 RX ADMIN — Medication 40 MCG/MIN: at 17:19

## 2020-01-01 RX ADMIN — SODIUM CHLORIDE, PRESERVATIVE FREE 10 ML: 5 INJECTION INTRAVENOUS at 21:09

## 2020-01-01 RX ADMIN — Medication 2000 ML/HR: at 19:02

## 2020-01-01 RX ADMIN — EPINEPHRINE 19 MCG/MIN: 1 INJECTION PARENTERAL at 13:47

## 2020-01-01 RX ADMIN — ASPIRIN 81 MG: 81 TABLET, COATED ORAL at 07:31

## 2020-01-01 RX ADMIN — DEXTROSE MONOHYDRATE 200 MG: 50 INJECTION, SOLUTION INTRAVENOUS at 12:12

## 2020-01-01 RX ADMIN — ASPIRIN 81 MG: 81 TABLET, COATED ORAL at 09:30

## 2020-01-01 RX ADMIN — DARUNAVIR 800 MG: 800 TABLET, FILM COATED ORAL at 08:24

## 2020-01-01 RX ADMIN — HEPARIN SODIUM 25 ML/HR: 5000 INJECTION INTRAVENOUS; SUBCUTANEOUS at 05:19

## 2020-01-01 RX ADMIN — Medication 1 CAPSULE: at 09:41

## 2020-01-01 RX ADMIN — ALTEPLASE 1 MG: 2.2 INJECTION, POWDER, LYOPHILIZED, FOR SOLUTION INTRAVENOUS at 16:48

## 2020-01-01 RX ADMIN — Medication 150 MCG/HR: at 20:20

## 2020-01-01 RX ADMIN — Medication 1000 ML/HR: at 14:44

## 2020-01-01 RX ADMIN — EPINEPHRINE 18 MCG/MIN: 1 INJECTION PARENTERAL at 06:20

## 2020-01-01 RX ADMIN — Medication 200 MCG/HR: at 13:18

## 2020-01-01 RX ADMIN — DARUNAVIR 800 MG: 800 TABLET, FILM COATED ORAL at 08:20

## 2020-01-01 RX ADMIN — FAMOTIDINE 20 MG: 10 INJECTION, SOLUTION INTRAVENOUS at 09:29

## 2020-01-01 RX ADMIN — Medication 1000 ML/HR: at 16:00

## 2020-01-01 RX ADMIN — Medication 1000 ML/HR: at 16:35

## 2020-01-01 RX ADMIN — DOCUSATE SODIUM 100 MG: 50 LIQUID ORAL at 07:43

## 2020-01-01 RX ADMIN — ROSUVASTATIN CALCIUM 10 MG: 10 TABLET, FILM COATED ORAL at 07:31

## 2020-01-01 RX ADMIN — Medication 200 MCG/HR: at 08:21

## 2020-01-01 RX ADMIN — VASOPRESSIN 0.04 UNITS/MIN: 20 INJECTION INTRAVENOUS at 22:34

## 2020-01-01 RX ADMIN — DOCUSATE SODIUM 100 MG: 50 LIQUID ORAL at 08:00

## 2020-01-01 RX ADMIN — METHYLPREDNISOLONE SODIUM SUCCINATE 125 MG: 125 INJECTION, POWDER, FOR SOLUTION INTRAMUSCULAR; INTRAVENOUS at 11:39

## 2020-01-01 RX ADMIN — SODIUM CHLORIDE 0.7 MCG/KG/MIN: 9 INJECTION, SOLUTION INTRAVENOUS at 20:15

## 2020-01-01 RX ADMIN — Medication 200 MG: at 08:24

## 2020-01-01 RX ADMIN — CALCIUM GLUCONATE 1 G: 20 INJECTION, SOLUTION INTRAVENOUS at 18:22

## 2020-01-01 RX ADMIN — DOCUSATE SODIUM 100 MG: 50 LIQUID ORAL at 08:20

## 2020-01-01 RX ADMIN — WHITE PETROLATUM 57.7 %-MINERAL OIL 31.9 % EYE OINTMENT: at 16:02

## 2020-01-01 RX ADMIN — Medication 1000 ML/HR: at 21:41

## 2020-01-01 RX ADMIN — Medication 2 CAPSULE: at 07:43

## 2020-01-01 RX ADMIN — Medication 50 MEQ: at 00:57

## 2020-01-01 RX ADMIN — Medication 100 MCG/HR: at 01:26

## 2020-01-01 RX ADMIN — WHITE PETROLATUM 57.7 %-MINERAL OIL 31.9 % EYE OINTMENT: at 00:49

## 2020-01-01 RX ADMIN — MEROPENEM 500 MG: 500 INJECTION, POWDER, FOR SOLUTION INTRAVENOUS at 17:16

## 2020-01-01 RX ADMIN — VASOPRESSIN 0.04 UNITS/MIN: 20 INJECTION INTRAVENOUS at 06:26

## 2020-01-01 RX ADMIN — Medication 150 MCG/HR: at 17:13

## 2020-01-01 RX ADMIN — Medication 200 MG: at 09:52

## 2020-01-01 RX ADMIN — POLYETHYLENE GLYCOL 3350 17 G: 17 POWDER, FOR SOLUTION ORAL at 08:20

## 2020-01-01 RX ADMIN — Medication 200 MCG/HR: at 10:37

## 2020-01-01 RX ADMIN — SERTRALINE HYDROCHLORIDE 25 MG: 25 TABLET ORAL at 08:01

## 2020-01-01 RX ADMIN — VASOPRESSIN 0.04 UNITS/MIN: 20 INJECTION INTRAVENOUS at 03:40

## 2020-01-01 RX ADMIN — WHITE PETROLATUM 57.7 %-MINERAL OIL 31.9 % EYE OINTMENT: at 12:02

## 2020-01-01 RX ADMIN — AZITHROMYCIN DIHYDRATE 250 MG: 500 INJECTION, POWDER, LYOPHILIZED, FOR SOLUTION INTRAVENOUS at 12:45

## 2020-01-01 RX ADMIN — Medication 2000 ML/HR: at 02:55

## 2020-01-01 RX ADMIN — CALCIUM GLUCONATE 1 G: 20 INJECTION, SOLUTION INTRAVENOUS at 20:37

## 2020-01-01 RX ADMIN — POLYETHYLENE GLYCOL 3350 17 G: 17 POWDER, FOR SOLUTION ORAL at 07:43

## 2020-01-01 RX ADMIN — CHLORHEXIDINE GLUCONATE 15 ML: 1.2 RINSE ORAL at 09:58

## 2020-01-01 RX ADMIN — CEFEPIME 2 G: 2 INJECTION, POWDER, FOR SOLUTION INTRAVENOUS at 03:55

## 2020-01-01 RX ADMIN — SODIUM CHLORIDE, SODIUM LACTATE, POTASSIUM CHLORIDE, AND CALCIUM CHLORIDE: .6; .31; .03; .02 INJECTION, SOLUTION INTRAVENOUS at 09:25

## 2020-01-01 RX ADMIN — ASPIRIN 81 MG: 81 TABLET, COATED ORAL at 09:09

## 2020-01-01 RX ADMIN — LEVOFLOXACIN 750 MG: 5 INJECTION, SOLUTION INTRAVENOUS at 16:49

## 2020-01-01 RX ADMIN — POLYETHYLENE GLYCOL 3350 17 G: 17 POWDER, FOR SOLUTION ORAL at 08:24

## 2020-01-01 RX ADMIN — CHLORHEXIDINE GLUCONATE 15 ML: 1.2 RINSE ORAL at 08:22

## 2020-01-01 RX ADMIN — ACETAMINOPHEN 650 MG: 325 TABLET ORAL at 22:32

## 2020-01-01 RX ADMIN — VASOPRESSIN 0.04 UNITS/MIN: 20 INJECTION INTRAVENOUS at 14:50

## 2020-01-01 RX ADMIN — SODIUM BICARBONATE: 84 INJECTION, SOLUTION INTRAVENOUS at 10:11

## 2020-01-01 RX ADMIN — HEPARIN SODIUM 5000 UNITS: 5000 INJECTION INTRAVENOUS; SUBCUTANEOUS at 05:01

## 2020-01-01 RX ADMIN — INSULIN LISPRO 2 UNITS: 100 INJECTION, SOLUTION INTRAVENOUS; SUBCUTANEOUS at 05:19

## 2020-01-01 RX ADMIN — INSULIN LISPRO 2 UNITS: 100 INJECTION, SOLUTION INTRAVENOUS; SUBCUTANEOUS at 13:51

## 2020-01-01 RX ADMIN — CHLORHEXIDINE GLUCONATE 15 ML: 1.2 RINSE ORAL at 08:00

## 2020-01-01 RX ADMIN — SODIUM CHLORIDE: 9 INJECTION, SOLUTION INTRAVENOUS at 10:05

## 2020-01-01 RX ADMIN — SERTRALINE HYDROCHLORIDE 25 MG: 25 TABLET ORAL at 09:30

## 2020-01-01 RX ADMIN — CASTOR OIL AND BALSAM, PERU: 788; 87 OINTMENT TOPICAL at 21:21

## 2020-01-01 RX ADMIN — Medication 2000 ML/HR: at 06:08

## 2020-01-01 RX ADMIN — PANTOPRAZOLE SODIUM 40 MG: 40 TABLET, DELAYED RELEASE ORAL at 07:31

## 2020-01-01 RX ADMIN — Medication 2 CAPSULE: at 18:37

## 2020-01-01 RX ADMIN — Medication 200 MCG/HR: at 23:08

## 2020-01-01 RX ADMIN — FAMOTIDINE 20 MG: 10 INJECTION, SOLUTION INTRAVENOUS at 20:01

## 2020-01-01 RX ADMIN — Medication 1000 ML/HR: at 09:17

## 2020-01-01 RX ADMIN — MEROPENEM 500 MG: 500 INJECTION, POWDER, FOR SOLUTION INTRAVENOUS at 11:29

## 2020-01-01 RX ADMIN — SODIUM CHLORIDE 75 ML/HR: 9 INJECTION, SOLUTION INTRAVENOUS at 22:45

## 2020-01-01 RX ADMIN — Medication 2 CAPSULE: at 08:25

## 2020-01-01 RX ADMIN — Medication 2 CAPSULE: at 16:03

## 2020-01-01 RX ADMIN — Medication 2000 ML/HR: at 19:37

## 2020-01-01 RX ADMIN — ASPIRIN 81 MG: 81 TABLET, COATED ORAL at 08:24

## 2020-01-01 RX ADMIN — VASOPRESSIN 0.04 UNITS/MIN: 20 INJECTION INTRAVENOUS at 05:22

## 2020-01-01 RX ADMIN — MEROPENEM 500 MG: 500 INJECTION, POWDER, FOR SOLUTION INTRAVENOUS at 16:25

## 2020-01-01 RX ADMIN — INSULIN LISPRO 4 UNITS: 100 INJECTION, SOLUTION INTRAVENOUS; SUBCUTANEOUS at 16:50

## 2020-01-01 RX ADMIN — MEROPENEM 500 MG: 500 INJECTION, POWDER, FOR SOLUTION INTRAVENOUS at 22:31

## 2020-01-01 RX ADMIN — VASOPRESSIN 0.04 UNITS/MIN: 20 INJECTION INTRAVENOUS at 21:53

## 2020-01-01 RX ADMIN — SERTRALINE HYDROCHLORIDE 25 MG: 25 TABLET ORAL at 09:09

## 2020-01-01 RX ADMIN — INSULIN GLARGINE 50 UNITS: 100 INJECTION, SOLUTION SUBCUTANEOUS at 10:06

## 2020-01-01 RX ADMIN — ACETAMINOPHEN 650 MG: 325 TABLET ORAL at 23:55

## 2020-01-01 RX ADMIN — EPINEPHRINE 30 MCG/MIN: 1 INJECTION PARENTERAL at 05:48

## 2020-01-01 RX ADMIN — INSULIN LISPRO 10 UNITS: 100 INJECTION, SOLUTION INTRAVENOUS; SUBCUTANEOUS at 08:40

## 2020-01-01 RX ADMIN — Medication 500 ML/HR: at 22:42

## 2020-01-01 RX ADMIN — CALCIUM GLUCONATE 1 G: 20 INJECTION, SOLUTION INTRAVENOUS at 17:11

## 2020-01-01 RX ADMIN — CHLORHEXIDINE GLUCONATE 15 ML: 1.2 RINSE ORAL at 12:03

## 2020-01-01 RX ADMIN — WHITE PETROLATUM 57.7 %-MINERAL OIL 31.9 % EYE OINTMENT: at 01:13

## 2020-01-01 RX ADMIN — HEPARIN SODIUM 25 ML/HR: 5000 INJECTION INTRAVENOUS; SUBCUTANEOUS at 03:04

## 2020-01-01 RX ADMIN — CHLORHEXIDINE GLUCONATE 15 ML: 1.2 RINSE ORAL at 20:01

## 2020-01-01 RX ADMIN — SODIUM CHLORIDE 1000 ML: 9 INJECTION, SOLUTION INTRAVENOUS at 08:53

## 2020-01-01 RX ADMIN — Medication 200 MCG/HR: at 19:40

## 2020-01-01 RX ADMIN — COBICISTAT 150 MG: 150 TABLET, FILM COATED ORAL at 09:50

## 2020-01-01 RX ADMIN — EPINEPHRINE 26 MCG/MIN: 1 INJECTION PARENTERAL at 10:00

## 2020-01-01 RX ADMIN — Medication 30 MCG/MIN: at 21:20

## 2020-01-01 RX ADMIN — Medication 200 MCG/HR: at 21:15

## 2020-01-01 RX ADMIN — Medication 200 MCG/HR: at 17:08

## 2020-01-01 RX ADMIN — WHITE PETROLATUM 57.7 %-MINERAL OIL 31.9 % EYE OINTMENT: at 14:04

## 2020-01-01 RX ADMIN — MEROPENEM 500 MG: 500 INJECTION, POWDER, FOR SOLUTION INTRAVENOUS at 17:15

## 2020-01-01 RX ADMIN — Medication 20 MEQ: at 10:42

## 2020-01-01 RX ADMIN — Medication 1000 ML/HR: at 17:50

## 2020-01-01 RX ADMIN — SERTRALINE HYDROCHLORIDE 25 MG: 25 TABLET ORAL at 08:25

## 2020-01-01 RX ADMIN — EPINEPHRINE 30 MCG/MIN: 1 INJECTION PARENTERAL at 23:43

## 2020-01-01 RX ADMIN — CALCIUM GLUCONATE 1 G: 20 INJECTION, SOLUTION INTRAVENOUS at 02:00

## 2020-01-01 RX ADMIN — Medication 2000 ML/HR: at 22:03

## 2020-01-01 RX ADMIN — ONDANSETRON 4 MG: 2 INJECTION INTRAMUSCULAR; INTRAVENOUS at 19:29

## 2020-01-01 RX ADMIN — WHITE PETROLATUM 57.7 %-MINERAL OIL 31.9 % EYE OINTMENT: at 21:09

## 2020-01-01 RX ADMIN — ACETAMINOPHEN 650 MG: 325 TABLET ORAL at 08:24

## 2020-01-01 RX ADMIN — MEROPENEM 500 MG: 500 INJECTION, POWDER, FOR SOLUTION INTRAVENOUS at 21:51

## 2020-01-01 RX ADMIN — SODIUM CHLORIDE 0.6 MCG/KG/MIN: 9 INJECTION, SOLUTION INTRAVENOUS at 21:15

## 2020-01-01 RX ADMIN — HEPARIN SODIUM 5000 UNITS: 5000 INJECTION INTRAVENOUS; SUBCUTANEOUS at 13:40

## 2020-01-01 RX ADMIN — Medication 1000 ML/HR: at 02:08

## 2020-01-01 RX ADMIN — ASPIRIN 81 MG: 81 TABLET, COATED ORAL at 07:43

## 2020-01-01 RX ADMIN — Medication 100 MEQ: at 01:31

## 2020-01-01 RX ADMIN — Medication 2 CAPSULE: at 08:20

## 2020-01-01 RX ADMIN — MAGNESIUM SULFATE HEPTAHYDRATE 2 G: 40 INJECTION, SOLUTION INTRAVENOUS at 10:42

## 2020-01-01 RX ADMIN — INSULIN LISPRO 6 UNITS: 100 INJECTION, SOLUTION INTRAVENOUS; SUBCUTANEOUS at 20:00

## 2020-01-01 RX ADMIN — Medication 2000 ML/HR: at 21:39

## 2020-01-01 RX ADMIN — CALCIUM GLUCONATE 1 G: 20 INJECTION, SOLUTION INTRAVENOUS at 01:21

## 2020-01-01 RX ADMIN — Medication 500 ML/HR: at 06:50

## 2020-01-01 RX ADMIN — Medication 200 MCG/HR: at 12:07

## 2020-01-01 RX ADMIN — WHITE PETROLATUM 57.7 %-MINERAL OIL 31.9 % EYE OINTMENT: at 07:44

## 2020-01-01 RX ADMIN — Medication 100 MCG/HR: at 02:03

## 2020-01-01 RX ADMIN — Medication 1000 ML/HR: at 20:43

## 2020-01-01 RX ADMIN — INSULIN HUMAN 10 UNITS: 100 INJECTION, SOLUTION PARENTERAL at 01:56

## 2020-01-01 RX ADMIN — INSULIN LISPRO 4 UNITS: 100 INJECTION, SOLUTION INTRAVENOUS; SUBCUTANEOUS at 16:00

## 2020-01-01 RX ADMIN — FAMOTIDINE 20 MG: 10 INJECTION, SOLUTION INTRAVENOUS at 07:43

## 2020-01-01 RX ADMIN — ACETAMINOPHEN 650 MG: 325 TABLET ORAL at 16:42

## 2020-01-01 ASSESSMENT — PULMONARY FUNCTION TESTS
PIF_VALUE: 30
PIF_VALUE: 29
PIF_VALUE: 36
PIF_VALUE: 29
PIF_VALUE: 25
PIF_VALUE: 29
PIF_VALUE: 30
PIF_VALUE: 28
PIF_VALUE: 18
PIF_VALUE: 34
PIF_VALUE: 34
PIF_VALUE: 33
PIF_VALUE: 30
PIF_VALUE: 30
PIF_VALUE: 33
PIF_VALUE: 33
PIF_VALUE: 26
PIF_VALUE: 39
PIF_VALUE: 19
PIF_VALUE: 32
PIF_VALUE: 25
PIF_VALUE: 30
PIF_VALUE: 47
PIF_VALUE: 46
PIF_VALUE: 41
PIF_VALUE: 30
PIF_VALUE: 24
PIF_VALUE: 30
PIF_VALUE: 38
PIF_VALUE: 32
PIF_VALUE: 40
PIF_VALUE: 39
PIF_VALUE: 34
PIF_VALUE: 35
PIF_VALUE: 39
PIF_VALUE: 30
PIF_VALUE: 32
PIF_VALUE: 46
PIF_VALUE: 38
PIF_VALUE: 21
PIF_VALUE: 45
PIF_VALUE: 31
PIF_VALUE: 22
PIF_VALUE: 33
PIF_VALUE: 40
PIF_VALUE: 44
PIF_VALUE: 32
PIF_VALUE: 28
PIF_VALUE: 34
PIF_VALUE: 41
PIF_VALUE: 40
PIF_VALUE: 19
PIF_VALUE: 27
PIF_VALUE: 40
PIF_VALUE: 25
PIF_VALUE: 32
PIF_VALUE: 29
PIF_VALUE: 28
PIF_VALUE: 32
PIF_VALUE: 30
PIF_VALUE: 34
PIF_VALUE: 28
PIF_VALUE: 38
PIF_VALUE: 22
PIF_VALUE: 31
PIF_VALUE: 25
PIF_VALUE: 29
PIF_VALUE: 32
PIF_VALUE: 26
PIF_VALUE: 23
PIF_VALUE: 20
PIF_VALUE: 41
PIF_VALUE: 40
PIF_VALUE: 35
PIF_VALUE: 37
PIF_VALUE: 32
PIF_VALUE: 39
PIF_VALUE: 27
PIF_VALUE: 24
PIF_VALUE: 29
PIF_VALUE: 32
PIF_VALUE: 24
PIF_VALUE: 34
PIF_VALUE: 38
PIF_VALUE: 36
PIF_VALUE: 33
PIF_VALUE: 22
PIF_VALUE: 18
PIF_VALUE: 22
PIF_VALUE: 18
PIF_VALUE: 22
PIF_VALUE: 30
PIF_VALUE: 45
PIF_VALUE: 29
PIF_VALUE: 36
PIF_VALUE: 42
PIF_VALUE: 31
PIF_VALUE: 31
PIF_VALUE: 32
PIF_VALUE: 23
PIF_VALUE: 29
PIF_VALUE: 23
PIF_VALUE: 31
PIF_VALUE: 18
PIF_VALUE: 31
PIF_VALUE: 24
PIF_VALUE: 18
PIF_VALUE: 32
PIF_VALUE: 22
PIF_VALUE: 42
PIF_VALUE: 38
PIF_VALUE: 26
PIF_VALUE: 19
PIF_VALUE: 30
PIF_VALUE: 30
PIF_VALUE: 41
PIF_VALUE: 41
PIF_VALUE: 24
PIF_VALUE: 32
PIF_VALUE: 32
PIF_VALUE: 34
PIF_VALUE: 26
PIF_VALUE: 22
PIF_VALUE: 18
PIF_VALUE: 30
PIF_VALUE: 30
PIF_VALUE: 44
PIF_VALUE: 41
PIF_VALUE: 32
PIF_VALUE: 19
PIF_VALUE: 30
PIF_VALUE: 32
PIF_VALUE: 32
PIF_VALUE: 40
PIF_VALUE: 32
PIF_VALUE: 25
PIF_VALUE: 45
PIF_VALUE: 34
PIF_VALUE: 28
PIF_VALUE: 37
PIF_VALUE: 31
PIF_VALUE: 28
PIF_VALUE: 37
PIF_VALUE: 30
PIF_VALUE: 31
PIF_VALUE: 29
PIF_VALUE: 31
PIF_VALUE: 29
PIF_VALUE: 36
PIF_VALUE: 21
PIF_VALUE: 31
PIF_VALUE: 29
PIF_VALUE: 30
PIF_VALUE: 30
PIF_VALUE: 32
PIF_VALUE: 40
PIF_VALUE: 40
PIF_VALUE: 19
PIF_VALUE: 31
PIF_VALUE: 34
PIF_VALUE: 31
PIF_VALUE: 21
PIF_VALUE: 29
PIF_VALUE: 38
PIF_VALUE: 30
PIF_VALUE: 18
PIF_VALUE: 33
PIF_VALUE: 25
PIF_VALUE: 41
PIF_VALUE: 30
PIF_VALUE: 25
PIF_VALUE: 20
PIF_VALUE: 38
PIF_VALUE: 41
PIF_VALUE: 19
PIF_VALUE: 40
PIF_VALUE: 41
PIF_VALUE: 34
PIF_VALUE: 41
PIF_VALUE: 45
PIF_VALUE: 38
PIF_VALUE: 33
PIF_VALUE: 37
PIF_VALUE: 35
PIF_VALUE: 36
PIF_VALUE: 26
PIF_VALUE: 18
PIF_VALUE: 31
PIF_VALUE: 42
PIF_VALUE: 40
PIF_VALUE: 25
PIF_VALUE: 29
PIF_VALUE: 41
PIF_VALUE: 36
PIF_VALUE: 35
PIF_VALUE: 29
PIF_VALUE: 40
PIF_VALUE: 32
PIF_VALUE: 37
PIF_VALUE: 39
PIF_VALUE: 34
PIF_VALUE: 28
PIF_VALUE: 22
PIF_VALUE: 41
PIF_VALUE: 41
PIF_VALUE: 26
PIF_VALUE: 35
PIF_VALUE: 18
PIF_VALUE: 33
PIF_VALUE: 31
PIF_VALUE: 20
PIF_VALUE: 32
PIF_VALUE: 26
PIF_VALUE: 40
PIF_VALUE: 42
PIF_VALUE: 36
PIF_VALUE: 33
PIF_VALUE: 32
PIF_VALUE: 32
PIF_VALUE: 30
PIF_VALUE: 18
PIF_VALUE: 36
PIF_VALUE: 33
PIF_VALUE: 30
PIF_VALUE: 32
PIF_VALUE: 30
PIF_VALUE: 32
PIF_VALUE: 27
PIF_VALUE: 28
PIF_VALUE: 23
PIF_VALUE: 26
PIF_VALUE: 33
PIF_VALUE: 22
PIF_VALUE: 39
PIF_VALUE: 39
PIF_VALUE: 23
PIF_VALUE: 31
PIF_VALUE: 39
PIF_VALUE: 30
PIF_VALUE: 33
PIF_VALUE: 40
PIF_VALUE: 26
PIF_VALUE: 29
PIF_VALUE: 18
PIF_VALUE: 26
PIF_VALUE: 28
PIF_VALUE: 37
PIF_VALUE: 30
PIF_VALUE: 32
PIF_VALUE: 26
PIF_VALUE: 22
PIF_VALUE: 32
PIF_VALUE: 34
PIF_VALUE: 36
PIF_VALUE: 42
PIF_VALUE: 25
PIF_VALUE: 28
PIF_VALUE: 41
PIF_VALUE: 27
PIF_VALUE: 29
PIF_VALUE: 31
PIF_VALUE: 35
PIF_VALUE: 23
PIF_VALUE: 36
PIF_VALUE: 22
PIF_VALUE: 27
PIF_VALUE: 37
PIF_VALUE: 29
PIF_VALUE: 33
PIF_VALUE: 42
PIF_VALUE: 21
PIF_VALUE: 29
PIF_VALUE: 33
PIF_VALUE: 35
PIF_VALUE: 39
PIF_VALUE: 22
PIF_VALUE: 27
PIF_VALUE: 32
PIF_VALUE: 28
PIF_VALUE: 41
PIF_VALUE: 42
PIF_VALUE: 39
PIF_VALUE: 38
PIF_VALUE: 45
PIF_VALUE: 26
PIF_VALUE: 34
PIF_VALUE: 17
PIF_VALUE: 32
PIF_VALUE: 29
PIF_VALUE: 38
PIF_VALUE: 30
PIF_VALUE: 29
PIF_VALUE: 38
PIF_VALUE: 34
PIF_VALUE: 41
PIF_VALUE: 30
PIF_VALUE: 28
PIF_VALUE: 35
PIF_VALUE: 32
PIF_VALUE: 29
PIF_VALUE: 28
PIF_VALUE: 40
PIF_VALUE: 27
PIF_VALUE: 41
PIF_VALUE: 29
PIF_VALUE: 28
PIF_VALUE: 18
PIF_VALUE: 19
PIF_VALUE: 28
PIF_VALUE: 34
PIF_VALUE: 31
PIF_VALUE: 31
PIF_VALUE: 30
PIF_VALUE: 18
PIF_VALUE: 32
PIF_VALUE: 21
PIF_VALUE: 28
PIF_VALUE: 32
PIF_VALUE: 29

## 2020-01-01 ASSESSMENT — PAIN DESCRIPTION - INTENSITY: RATING_2: 10

## 2020-01-01 ASSESSMENT — PAIN DESCRIPTION - LOCATION
LOCATION: LEG
LOCATION: NECK
LOCATION_2: NECK
LOCATION: LEG

## 2020-01-01 ASSESSMENT — ENCOUNTER SYMPTOMS
ABDOMINAL PAIN: 0
WHEEZING: 0
ABDOMINAL DISTENTION: 0
VOMITING: 0
NAUSEA: 0
COLOR CHANGE: 0
COUGH: 1
BACK PAIN: 0
SHORTNESS OF BREATH: 1
ABDOMINAL PAIN: 0
BACK PAIN: 0
SHORTNESS OF BREATH: 0
STRIDOR: 0
VOMITING: 0
SHORTNESS OF BREATH: 1
EYE REDNESS: 0
FACIAL SWELLING: 0
TROUBLE SWALLOWING: 0
NAUSEA: 0
BACK PAIN: 0
CHOKING: 0
CONSTIPATION: 0
EYE DISCHARGE: 0
ABDOMINAL PAIN: 0
VOICE CHANGE: 0
SORE THROAT: 0
NAUSEA: 0
APNEA: 0
COUGH: 1
VOMITING: 0
SORE THROAT: 0
DIARRHEA: 0
DIARRHEA: 0
COUGH: 1
COLOR CHANGE: 0

## 2020-01-01 ASSESSMENT — PAIN SCALES - GENERAL
PAINLEVEL_OUTOF10: 0
PAINLEVEL_OUTOF10: 3
PAINLEVEL_OUTOF10: 0
PAINLEVEL_OUTOF10: 10
PAINLEVEL_OUTOF10: 0
PAINLEVEL_OUTOF10: 10
PAINLEVEL_OUTOF10: 10
PAINLEVEL_OUTOF10: 0
PAINLEVEL_OUTOF10: 10
PAINLEVEL_OUTOF10: 0
PAINLEVEL_OUTOF10: 10
PAINLEVEL_OUTOF10: 10
PAINLEVEL_OUTOF10: 3

## 2020-01-01 ASSESSMENT — PAIN DESCRIPTION - DESCRIPTORS
DESCRIPTORS_2: OTHER (COMMENT)
DESCRIPTORS: BURNING;CONSTANT
DESCRIPTORS: DULL
DESCRIPTORS: CRUSHING
DESCRIPTORS: BURNING;CONSTANT
DESCRIPTORS: DULL
DESCRIPTORS: DULL

## 2020-01-01 ASSESSMENT — PAIN DESCRIPTION - DURATION: DURATION_2: INTERMITTENT

## 2020-01-01 ASSESSMENT — PAIN DESCRIPTION - PAIN TYPE
TYPE: ACUTE PAIN
TYPE: ACUTE PAIN
TYPE: SURGICAL PAIN
TYPE_2: ACUTE PAIN
TYPE: ACUTE PAIN
TYPE: ACUTE PAIN
TYPE: SURGICAL PAIN

## 2020-01-01 ASSESSMENT — PAIN DESCRIPTION - PROGRESSION
CLINICAL_PROGRESSION: NOT CHANGED
CLINICAL_PROGRESSION_2: NOT CHANGED
CLINICAL_PROGRESSION: GRADUALLY WORSENING
CLINICAL_PROGRESSION: NOT CHANGED
CLINICAL_PROGRESSION: NOT CHANGED

## 2020-01-01 ASSESSMENT — PAIN DESCRIPTION - DIRECTION: RADIATING_TOWARDS_2: HEAD

## 2020-01-01 ASSESSMENT — PAIN DESCRIPTION - ONSET
ONSET: ON-GOING
ONSET_2: GRADUAL

## 2020-01-01 ASSESSMENT — PAIN - FUNCTIONAL ASSESSMENT
PAIN_FUNCTIONAL_ASSESSMENT: PREVENTS OR INTERFERES SOME ACTIVE ACTIVITIES AND ADLS
PAIN_FUNCTIONAL_ASSESSMENT: PREVENTS OR INTERFERES SOME ACTIVE ACTIVITIES AND ADLS
PAIN_FUNCTIONAL_ASSESSMENT: ACTIVITIES ARE NOT PREVENTED
PAIN_FUNCTIONAL_ASSESSMENT: PREVENTS OR INTERFERES SOME ACTIVE ACTIVITIES AND ADLS

## 2020-01-01 ASSESSMENT — PAIN DESCRIPTION - FREQUENCY
FREQUENCY: CONTINUOUS

## 2020-01-01 ASSESSMENT — PAIN DESCRIPTION - ORIENTATION
ORIENTATION: RIGHT
ORIENTATION: RIGHT;LEFT
ORIENTATION_2: UPPER
ORIENTATION: RIGHT;LEFT
ORIENTATION: RIGHT

## 2020-01-09 NOTE — TELEPHONE ENCOUNTER
Cardiac Risk Assessment message information:     What type of procedure are you having:  Right knee replacement   When is your procedure scheduled for:  February 12th, 2020   Who is the physician performing your procedure:  Dr. Edwige Acosta True  Which medications need to be held for your procedure and for how long:  Patient unsure     Phone Number:  [phone]   Fax number to send the letter:  [phone] can reach [employer] at [phone].       Clinical Staff use:     Cardiologist: Dr. Bernice Nickerson  Last Appointment:  Next Appointment:  Are you on any blood thinners:  History of Coronary Artery Disease:  Last stress test:  Last echo:  Device Type and :

## 2020-01-09 NOTE — LETTER
Maurice Buck  Phone: [phone]  Fax: [phone]    Lorraine Hopson MD        January 13, 2020     Patient: Ya Moreno   YOB: 1950           To Whom It May Concern: It is my medical opinion that Suraj Cousins may proceed with right total knee replacement on 2/12/2020. Mrs. Corky Aguilar may hold her ASA seven days prior. If you have any questions or concerns, please don't hesitate to call.     Sincerely,        Lorraine Hopson MD

## 2020-02-27 NOTE — TELEPHONE ENCOUNTER
Jessi Fraction called in stating that she had a total knee replacement and since her surgery she has experienced leg/foot numbness. She states she is a diabetic but she has never experienced anything this bad. She wants to know what she should do about this. You can reach Jessi Westbrook at [phone].

## 2020-02-27 NOTE — TELEPHONE ENCOUNTER
Patient is wondering if she is having circulation issues due to numbness and coldness in her feet. Please advise.

## 2020-02-28 NOTE — TELEPHONE ENCOUNTER
Ladies, please schedule patient to BLE arterial duplex(order placed) per patient's request on Friday 3/13/20 as early in the morning as possible. Call her with all information. She will have to update her transportation on appt. Date/time. Thanks. Lalo Lawson returned my call. She moved on 12/18/19 and cancelled 12/17/19 R TKR with Dr. Robin Chowdhury. [facility] with Dr. Keisha Bell. She states she canceled post up follow up. She did call and leave a message/info she gave our office. She then proceeded to express her grievances of what transpired around her R TKR at [facility]. Phone call > 15 minutes.

## 2020-03-13 NOTE — ED NOTES
Pt states she is unable to ambulate due to dizziness, feels [facility] needs [facility] be [facility].       Rod Rowley RN  03/13/20 1425

## 2020-03-13 NOTE — ED PROVIDER NOTES
[address]  [address] [address]  Dept: [phone]  Loc: [facility]    Chief Complaint   Patient presents with    Dizziness     pt states she hasnt felt well since her knee replacement in 2/2020, dizziness for a few days. HPI Dennard Saint is a 68 y.o. female who presents the emergency department via EMS after she was sitting on a bench waiting for her cab. She states she felt an abrupt onset of weakness. She denied weakness in any particular extremity, numbness or paresthesias. She states \"I just feel weak all over. \"  She denies blurred vision or diplopia. She does admit to having vision impairments especially in the right eye but this is chronic for her and her vision has not changed in any way today. She did not have a loss of consciousness but she states she felt like she almost did pass out. She did not fall. The chief complaint states dizziness but the patient states she does not feel dizzy she just feels overwhelmingly weak. She does have a recent right total knee replacement done at [facility] and states \"I am not going back there. \"  She states she did too well at the time of admission for eligibility for rehab and she states that she they discharged her home. She states she feels like this was a mistake because she just feels weak today. She states her friend was sitting next to her during the week spell and they decided to come to the emergency department. In the meantime she denies body aches, fever, chills, shortness of breath, chest pain, abdominal pain, nausea, vomiting or urinary symptoms. She is a diabetic. Has been checking her blood sugars and she states that she has had no problems with them. She did eat today.     REVIEW OF SYSTEMS    Cardiac: No chest pain or palpitations  Respiratory: No shortness of breath mouth 3 times daily as needed for Dizziness       Dulaglutide (TRULICITY) 3.70 XG/8.5FR SOPN Inject 0.75 mg into the skin once a week      LINZESS 290 MCG CAPS capsule Take 290 mcg by mouth daily as needed for Constipation      STOOL SOFTENER 100 MG capsule Take 100 mg by mouth 2 times daily as needed for Constipation      ezetimibe (ZETIA) 10 MG tablet Take 10 mg by mouth daily      pantoprazole (PROTONIX) 40 MG tablet Take 40 mg by mouth daily      sertraline (ZOLOFT) 25 MG tablet Take 25 mg by mouth daily       aspirin 81 MG tablet Take 81 mg by mouth daily      vitamin B-12 (CYANOCOBALAMIN) 500 MCG tablet Take 500 mcg by mouth daily      Multiple Vitamins-Minerals (THERAPEUTIC MULTIVITAMIN-MINERALS) tablet Take 1 tablet by mouth daily      Ascorbic Acid (VITAMIN C) 1000 MG tablet Take 1,000 mg by mouth daily       anastrozole (ARIMIDEX) 1 MG tablet Take 1 mg by mouth daily      furosemide (LASIX) 40 MG tablet TAKE 1 TABLET BY MOUTH ONCE DAILY AS DIRECTED 60 tablet 3    insulin glargine (LANTUS) 100 UNIT/ML injection vial Inject 50 Units into the skin daily. (Patient taking differently: Inject 45-52 Units into the skin daily Takes in AM) 1 vial 10    Cholecalciferol (VITAMIN D) 50 MCG (2000 UT) TABS tablet Take 2,000 Units by mouth daily      traMADol (ULTRAM) 50 MG tablet Take 50 mg by mouth 4 times daily as needed for Pain.       ipratropium-albuterol (DUONEB) 0.5-2.5 (3) MG/3ML SOLN nebulizer solution Inhale 1 vial into the lungs every 4 hours as needed for Shortness of Breath       rosuvastatin (CRESTOR) 10 MG tablet Take 1 tablet by mouth daily 30 tablet 3       ALLERGIES    Allergies   Allergen Reactions    Latex Rash    Molds & Smuts Shortness Of Breath    Dye [Iodides] Hives    Pollen Extract Other (See Comments)     sneeze    Hydrocodone     Esomeprazole Sodium Nausea And Vomiting    Ibuprofen Nausea And Vomiting    Lyrica [Pregabalin] Nausea And Vomiting    Morphine Nausea And Vomiting    Oxycontin [Oxycodone Hcl] Nausea And Vomiting       SOCIAL & FAMILY HISTORY  Social History     Socioeconomic History    Marital status:      Spouse name: None    Number of children: 2    Years of education: None    Highest education level: None   Occupational History    Occupation: retired      Comment: retired    Occupation: disability   Social Needs    Financial resource strain: None    Food insecurity     Worry: None     Inability: None    Transportation needs     Medical: None     Non-medical: None   Tobacco Use    Smoking status: Former Smoker     Packs/day: 0.70     Years: 2.00     Pack years: 1.40     Types: Cigarettes     Last attempt to quit: 1971     Years since quittin.8    Smokeless tobacco: Never Used   Substance and Sexual Activity    Alcohol use: Yes     Comment: rare    Drug use: No    Sexual activity: Not Currently   Lifestyle    Physical activity     Days per week: None     Minutes per session: None    Stress: None   Relationships    Social connections     Talks on phone: None     Gets together: None     Attends Spiritism service: None     Active member of club or organization: None     Attends meetings of clubs or organizations: None     Relationship status: None    Intimate partner violence     Fear of current or ex partner: None     Emotionally abused: None     Physically abused: None     Forced sexual activity: None   Other Topics Concern    None   Social History Narrative    None     Family History   Problem Relation Age of Onset    Heart Disease Mother     Cirrhosis Mother     Diabetes Father     Prostate Cancer Father     Cancer Father     Cirrhosis Sister     Diabetes Sister        PHYSICAL EXAM    VITAL SIGNS: /70   Pulse 80   Temp 98.8 °F (37.1 °C) (Oral)   Resp 17   Ht 5' 9\" (1.753 m)   Wt 189 lb 13.1 oz (86.1 kg)   LMP 1975   SpO2 100%   BMI 28.03 kg/m²   Constitutional:  Well developed, well SpO2:       Weight:       Height:         Medications   0.9 % sodium chloride bolus (0 mLs Intravenous Stopped 3/13/20 1058)   meclizine (ANTIVERT) tablet 25 mg (25 mg Oral Given 3/13/20 1104)   promethazine (PHENERGAN) injection 12.5 mg (12.5 mg Intravenous Given 3/13/20 1104)   diphenhydrAMINE (BENADRYL) injection 12.5 mg (12.5 mg Intravenous Given 3/13/20 1139)   famotidine (PEPCID) injection 20 mg (20 mg Intravenous Given 3/13/20 1139)   methylPREDNISolone sodium (SOLU-MEDROL) injection 125 mg (125 mg Intravenous Given 3/13/20 1139)   iopamidol (ISOVUE-370) 76 % injection 75 mL (75 mLs Intravenous Given 3/13/20 1206)     Patient was seen and evaluated by myself and my attending physician Dr. Ross Marshall. Differential diagnosis includes but is not limited to stroke in the posterior circulation, anemia, dehydration, electrolyte derangement, ACS, infection, other. She is nontoxic in appearance and hemodynamically stable. She developed an abrupt onset of weakness while sitting on the bench waiting for her. She states she felt like she was going to pass out but did not. Initially she denied dizziness or room spinning sensation with me. Her neuro exam is unremarkable. When she arrived she had a systolic blood pressure of 92. I gave her a liter of fluids and checked her labs. After the fluids I reevaluated her and asked her how she was feeling and she stated that she felt worse. She states the room is spinning and she feels like she is falling whenever we move her. I added on Phenergan and Antivert and informed Dr. Ross Marshall. At that time we added a CT of the head and CTA of the neck and head as well as a CT chest to rule out PE as she had a near syncopal episode and she is recent right total knee replacement. She tells me she has never had this before but her medication list reveals that she has meclizine at home. Labs reveal no leukocytosis. Hemoglobin is 10 with a hematocrit of 29.6.   CMP with normal kidney function. CO2 is 19 with an anion gap 17 and a glucose of 179. Troponin less than 0.01. Portable chest x-ray is interpreted by radiology shows no acute process. We attempted to get the patient up after medicating the patient giving her fluids the patient states that she just cannot do it. She is requesting to be admitted. I spoke with hospitalist regarding this patient and the plan will be for her to receive a PT OT eval and treat. I talked to the patient after this and asked her if she would be willing to go to a nursing home facility for rehab if we are able to get this approved and she said that she would like this very much. At this time I put in the orders for PT OT eval and treat him for  to get involved with this patient to help with placement. From  was at the bedside spending a significant amount of time with the patient. The patient states she just wants to be in a rehab facility well she has daily therapy for her knee. Please see  note for details of the plan but for now the patient's  is going to look into approving a respite stay at a facility under her [address] but this will be arranged once the patient is discharged from the ED and back home. The patient agrees with this plan and she states she hopes to get to a nursing facility. I instructed her to always return to the emergency department otherwise for worsening symptoms. Patient verbalized understanding of the discharge instructions. FINAL IMPRESSION    1. Near syncope    2.  Vertigo        PLAN  Discharge to home    (Please note that this note was completed with a voice recognition program.  Every attempt was made to edit the dictations, but inevitably there remain words that are mis-transcribed.)           Chanda Gardner, JOSE - CNP  03/13/20 [address], JOSE  LILY  03/13/20 [id number]

## 2020-03-15 NOTE — ED PROVIDER NOTES
Mid Level Attestation   I havepersonally performed and/or participated in the history, exam and medical decision making and agree with all pertinent clinical information. I have also reviewed and agree with the past medical, family and social historyunless otherwise noted. I have personally performed a face to face diagnostic evaluation onthis patient. I have reviewed the mid-levels findings and agree. EKG by my preliminary interpretation shows sinus rhythm with rate of 78, normal axis, normal intervals, with no ST changes indicative of ischemia at this time. This record is transcribed utilizing voice recognition technology. There are inherent limitations in this technology. In addition, there may be limitations in editing of this report. If there are any discrepancies, please contact me directly.       Harvinder Raza MD   3/14/2020          Nicholas Leyva MD  03/14/20 2019

## 2020-03-24 NOTE — ED TRIAGE NOTES
Patient with a recurrent cough and fatigue for the last three days. Patient was just here 10 days ago for similar symptoms, and was not admitted. Patient states she is dizzy, and fatigued with non-productive cough. Patient denies pain but states a \"weakness over her stomach\". Patient feels SOB, denies chest pain. Denies N,V,D. Oxygenating well on RA. Intermittent dry cough, breath sounds clear with slight diminished in bases. Patient speaking clearly without getting SOB. Patient states she has Stage III Sarcoidosis of the lungs and has been told that these symptoms could be progression of her diagnosis. Patient is Type II DM, insulin dependent. Patient is febrile, other VSS. A/O x4, with repetitive c/o dizziness.

## 2020-03-24 NOTE — ED PROVIDER NOTES
I personally evaluated and examined the patient in conjunction with the RAINA and agree with the assessment, treatment plan and disposition of the patient as recorded by the RAINA. I reviewed pertinent nursing notes, triage notes, vital signs, past medical history, family and social history, medications, and allergies. Complete review of systems was conducted by the RAINA and/or myself. Review of systems is negative except as documented in the history of present illness. Brief HPI: This is a 75-year-old female presents the emergency department with multiple complaints including cough and fever for the past couple of days. No chills or body aches. Reports that back on February 12 she had a knee replacement and it was deemed to be appropriate for outpatient physical therapy although she had wanted acute rehab. She reports that she has a history of sarcoidosis and has been having difficulty getting around. She reports that she is very weak. She reports she is continued to do physical therapy with her outpatient physical therapist.  He was seen yesterday at [facility].  She had full cardiac work-up including CT PE protocol which did not show acute findings. Flu swab is negative. Blood cultures in process. She denies any new worsening redness or swelling of the right knee. No increasing pain. She was treated for possible bronchitis as well as UTI with Bactrim/Keflex. Physical Exam: General: Patient is in no acute distress   Head: Normocephalic, atraumatic, pupils are equal and reactive to light. EOMI. Neck: Neck is supple. No JVD noted. Heart: RRR no murmurs, rubs, or gallops   Lungs: Coarse breath sounds on the right but no respiratory distress. Pulse ox is 99% on room air. Abdomen: soft, non-tender, non-distended   Extremities: no lower extremity edema. Capillary refill is less than 2 seconds. Patient has minimal warmth of the right knee. Positive effusion.   No pain with passive
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
## 2020-03-24 NOTE — ED NOTES
Patient ambulated to the hallway and back SPO2 99% and heart rate never went higher than 80bpm, once in the hallway patient reporting that she became dizzy, patient able to back self up into the bed. Ryan Joseph made aware.       Kate Velasquez RN  03/24/20 2518

## 2020-03-24 NOTE — CARE COORDINATION
SW consulted to assist pt with transportation home. Pt stating she needed assistance from the car to the house and unable to take [place]. MAHAD contacted [facility] ([phone], option [phone]), which pt reported she uses frequently, to see if they would assist with transport. While on [facility], MAHAD informed pt's dtr has been contacted and will drive from [place] to  pt and take home. No other SW needs reported from RN at this time.      Electronically signed by YANNICK Wilkes, CLAUDIAW on 3/24/2020 at 4:22 PM

## 2020-03-24 NOTE — ED PROVIDER NOTES
[facility]        Pt Name: Ryland Cabot  MRN: [id number]  [place] 1950  Date of evaluation: 3/24/2020  Provider: Mayte Chisholm PA-C  PCP: Sharifa Hammond MD     I have seen and evaluated this patient with my supervising physician Beverley Machado DO.    CHIEF COMPLAINT       Chief Complaint   Patient presents with    Cough     Pt to ER via EMS - seen at [facility] yesterday and diagnosed with bronchitis. Has not taken abx. States she spoke to PCP today who \"told me to come here to [facility] because they want me tested for the corona\"       HISTORY OF PRESENT ILLNESS   (Location, Timing/Onset, Context/Setting, Quality, Duration, Modifying Factors, Severity, Associated Signs and Symptoms)  Note limiting factors. Ryland Cabot is a 65 y.o. female who presents to the emergency department with cough, congestion, intermittent fever, fatigue. Patient has been in and out of the hospital several times over the past month. In February, she was admitted to [facility] and had a knee replacement. She claims that she has had some redness around the knee replacement site and went to [facility] [place] on 3/13/2020 because she had a cough at that time and redness around her knee. She reportedly felt dizzy also. She had a CT head of the brain which was unremarkable. She had a CTA of her head and neck which was unremarkable. She had a CT chest to rule out PE which was unremarkable. Blood work was stable at that time. She went back to [facility] on 3/23/2020 yesterday and had a CT chest without contrast which was negative for pneumonia. She was diagnosed with cellulitis of the knee. She was sent home with Bactrim and Keflex but has not filled this prescription yet. She called her primary care doctor today and was told to go to the ER to test for COVID-19.      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    ID REPAIR MAJOR PERIPHERAL NERVE Right 5/7/2019    RIGHT ULNAR NERVE DECOMPRESSION AT ELBOW AND RIGHT CARPAL TUNNEL RELEASE, AND RIGHT MIDDLE FINGER TRIGGER FINGER RELEASE performed by Martha Arias MD at [address] [facility] [address]      right wrist         [address]       Discharge Medication List as of 3/24/2020  3:53 PM      CONTINUE these medications which have NOT CHANGED    Details   sulfamethoxazole-trimethoprim (BACTRIM DS) 800-160 MG per tablet Take 1 tablet by mouth 2 times daily for 7 days, Disp-14 tablet, R-0Print      cephALEXin (KEFLEX) 500 MG capsule Take 1 capsule by mouth 4 times daily for 7 days, Disp-28 capsule, R-0Print      meclizine (ANTIVERT) 25 MG tablet Take 25 mg by mouth 3 times daily as needed for Dizziness Historical Med      Dulaglutide (TRULICITY) 0.60 JL/2.4JA SOPN Inject 0.75 mg into the skin once a weekHistorical Med      LINZESS 290 MCG CAPS capsule Take 290 mcg by mouth daily as needed for Constipation, DAWHistorical Med      Cholecalciferol (VITAMIN D) 50 MCG (2000 UT) TABS tablet Take 2,000 Units by mouth dailyHistorical Med      STOOL SOFTENER 100 MG capsule Take 100 mg by mouth 2 times daily as needed for Constipation, DAWHistorical Med      ezetimibe (ZETIA) 10 MG tablet Take 10 mg by mouth dailyHistorical Med      pantoprazole (PROTONIX) 40 MG tablet Take 40 mg by mouth dailyHistorical Med      traMADol (ULTRAM) 50 MG tablet Take 50 mg by mouth 4 times daily as needed for Pain. Historical Med      sertraline (ZOLOFT) 25 MG tablet Take 25 mg by mouth daily Historical Med      aspirin 81 MG tablet Take 81 mg by mouth dailyHistorical Med      ipratropium-albuterol (DUONEB) 0.5-2.5 (3) MG/3ML SOLN nebulizer solution Inhale 1 vial into the lungs every 4 hours as needed for Shortness of Breath Historical Med      rosuvastatin (CRESTOR) 10 MG tablet Take 1 tablet by mouth daily, Disp-30 tablet, R-3Normal      vitamin B-12 (CYANOCOBALAMIN) 500 MCG tablet Take 500 mcg by mouth dailyHistorical Med      Multiple Vitamins-Minerals (THERAPEUTIC MULTIVITAMIN-MINERALS) tablet Take 1 tablet by mouth dailyHistorical Med      Ascorbic Acid (VITAMIN C) 1000 MG tablet Take 1,000 mg by mouth daily Historical Med      anastrozole (ARIMIDEX) 1 MG tablet Take 1 mg by mouth dailyHistorical Med      furosemide (LASIX) 40 MG tablet TAKE 1 TABLET BY MOUTH ONCE DAILY AS DIRECTED, Disp-60 tablet, R-3      insulin glargine (LANTUS) 100 UNIT/ML injection vial Inject 50 Units into the skin daily. , Disp-1 vial, R-10               ALLERGIES     Latex; Molds & smuts; Dye [iodides]; Pollen extract; Hydrocodone; Esomeprazole sodium; Ibuprofen; Lyrica [pregabalin];  Morphine; and Oxycontin [oxycodone hcl]    FAMILYHISTORY       Family History   Problem Relation Age of Onset    Heart Disease Mother     Cirrhosis Mother     Diabetes Father     Prostate Cancer Father     Cancer Father     Cirrhosis Sister     Diabetes Sister           SOCIAL HISTORY       Social History     Tobacco Use    Smoking status: Former Smoker     Packs/day: 0.70     Years: 2.00     Pack years: 1.40     Types: Cigarettes     Last attempt to quit: 1971     Years since quittin.8    Smokeless tobacco: Never Used   Substance Use Topics    Alcohol use: Yes     Comment: rare    Drug use: No       SCREENINGS             PHYSICAL EXAM    (up to 7 for level 4, 8 or more for level 5)     ED Triage Vitals   BP Temp Temp src Pulse Resp SpO2 Height Weight   -- -- -- -- -- -- -- --       Physical Exam  Vitals signs and nursing note reviewed. Constitutional:       Appearance: Normal appearance. She is well-developed. She is not toxic-appearing or diaphoretic. HENT:      Head: Normocephalic and atraumatic. Right Ear: External ear normal.      Left Ear: External ear normal.      Nose: Nose normal.      Mouth/Throat:      Mouth: Mucous membranes are moist.      Pharynx: Oropharynx is clear. Eyes:      General: No scleral icterus. Right eye: No discharge. Left eye: No discharge. Extraocular Movements: Extraocular movements intact. Conjunctiva/sclera: Conjunctivae normal.      Pupils: Pupils are equal, round, and reactive to light. Neck:      Musculoskeletal: Normal range of motion. Cardiovascular:      Rate and Rhythm: Normal rate. Pulmonary:      Effort: Pulmonary effort is normal.      Breath sounds: Normal breath sounds. Abdominal:      General: Bowel sounds are normal.      Palpations: Abdomen is soft. Tenderness: There is no abdominal tenderness. Musculoskeletal:      Right hip: Normal.      Right knee: She exhibits decreased range of motion (able to flex and extend ), swelling and effusion. Tenderness found. Right ankle: Normal. Achilles tendon normal.      Right upper leg: Normal.      Right lower leg: Normal.        Legs:       Right foot: Normal.   Skin:     General: Skin is warm and dry. Capillary Refill: Capillary refill takes less than 2 seconds. Coloration: Skin is not jaundiced or pale. Findings: No bruising or lesion. Neurological:      General: No focal deficit present. Mental Status: She is alert and oriented to person, place, and time. Cranial Nerves: No cranial nerve deficit. Psychiatric:         Attention and Perception: Attention and perception normal.         Mood and Affect: Mood is anxious. Speech: Speech normal.         Behavior: Behavior normal. Behavior is cooperative. Thought Content: Thought content normal.         Cognition and Memory: Cognition normal.         Judgment: Judgment normal.         DIAGNOSTIC RESULTS   LABS:    Labs Reviewed - No data to display    All other labs were within normal range or not returned as of this dictation. EKG: All EKG's are interpreted by the Emergency Department Physician in the absence of a cardiologist.  Please see their note for interpretation of EKG. RADIOLOGY:   Non-plain film images such as CT, Ultrasound and MRI are read by the radiologist. Plain radiographic images are visualized and preliminarily interpreted by the ED Provider with the below findings:        Interpretation per the Radiologist below, if available at the time of this note:    XR KNEE RIGHT (1-2 VIEWS)   Final Result   1. Right total knee prosthesis appears intact. 2. Soft tissue swelling of the knee with possible joint effusion. XR CHEST PORTABLE   Final Result   Bilateral parenchymal linear densities and opacities likely chronic such as   atelectasis and/or scarring. This is similar to recent and prior studies and   best demonstrated on recent outside CT. Ct Chest Wo Contrast    Result Date: 3/24/2020  EXAMINATION: CT OF THE CHEST WITHOUT CONTRAST 3/23/2020 8:32 pm TECHNIQUE: CT of the chest was performed without the administration of intravenous contrast. Multiplanar reformatted images are provided for review. Dose modulation, iterative reconstruction, and/or weight based adjustment of the mA/kV was utilized to reduce the radiation dose to as low as reasonably achievable.  COMPARISON: 03/13/2020 HISTORY: ORDERING SYSTEM PROVIDED HISTORY: Cough and fever TECHNOLOGIST PROVIDED HISTORY: Reason for exam:->Cough and fever Reason for Exam: Cough and fever Acuity: Acute Relevant Medical/Surgical History: hx diabetes, colon & breast ca, & sarcoidosis of conversation. EMERGENCY DEPARTMENT COURSE and DIFFERENTIAL DIAGNOSIS/MDM:   Vitals:    Vitals:    03/24/20 1257 03/24/20 1300 03/24/20 1400 03/24/20 1430   BP:  118/61 (!) 98/53 (!) 111/55   Pulse:   74    Resp:   15    Temp:   100.1 °F (37.8 °C)    SpO2: 100% 100% 96% 97%       Patient was given the following medications:  Medications   acetaminophen (TYLENOL) tablet 1,000 mg (1,000 mg Oral Given 3/24/20 1246)       This patient presents to the emergency department complaining of persistent right knee pain ever since the right knee replacement in February at [facility].  She has sarcoidosis and does report a chronic cough. She has had intermittent fever and chills over the past few days. The patient was seen at [facility] on 3/13/2020 for some dizziness and fatigue she was having. CT scans at that time appeared stable. She went back to [facility] yesterday. She had a CT scan of her chest without contrast which did not show evidence of pneumonia. She is currently febrile but denies any chest pain. Tylenol was given for fever. She does have some warmth to her right knee and was prescribed Bactrim and Keflex yesterday for UTI and potential cellulitis in the right knee. She dropped off the prescription of the pharmacist but has not picked up the prescriptions yet. As long as she is not ambulating, she does not have any significant pain to her right knee. Leg circumference appears similar to her left leg. Does not have any posterior calf or thigh tenderness or palpable cord. Flu swabs were negative yesterday. bloodwork was fairly stable just yesterday.  My suspicion is low for influenza, pertussis, pneumonia, pneumothorax, ARDS, complication from congenital heart defect, aortic coarctation, otitis, dental abscess, Alexey angina, trench mouth, intussusception,ACS, PE, myocarditis, pericarditis, endocarditis, acute pulmonary edema, pleural effusion, pericardial effusion, cardiac tamponade, acute CHF Emergency Department  [facility]  [phone]    If symptoms worsen      DISCHARGE MEDICATIONS:  Discharge Medication List as of 3/24/2020  3:53 PM          DISCONTINUED MEDICATIONS:  Discharge Medication List as of 3/24/2020  3:53 PM                 (Please note that portions of this note were completed with a voice recognition program.  Efforts were made to edit the dictations but occasionally words are mis-transcribed.)    Roni Thompson PA-C (electronically signed)           Roni Thompson PA-C  03/24/20 5731

## 2020-03-24 NOTE — ED PROVIDER NOTES
Attending Supervisory Note/Shared Visit   I have personally performed a face to face diagnostic evaluation on this patient. I have reviewed the mid-levels findings and agree. History and Exam by me shows alert black female no acute distress. She presents complaining of a cough. She is status post right total knee replacement about 6 weeks ago. She is had some persistent pain and swelling in her knee. She states the knee feels hot. She felt like she had a fever at home. She feels generally weak and lightheaded. Heart: Regular rate and rhythm. No murmurs or gallops noted. Lungs: Breath sounds equal bilaterally and clear. Abdomen: Obese, soft, nontender. No masses organomegaly. Musculoskeletal: Moderate diffuse swelling of the right knee. Well-healed incision over the anterior right knee. There is some increased warmth in the knee but no significant erythema. Range of motion is somewhat limited with moderate pain. EKG: Normal sinus rhythm, rate of 78, nonspecific T wave flattening. Rhythm strip shows a sinus rhythm with a rate of 78, OH interval of 160 ms, QRS of 80 ms with no other ectopy as interpreted by me. No significant change compared to EKG dated 5/18/2011. Lab reviewed. H&H are 10.6 and 32.6. White blood cell count 5100 with 65 neutrophils and 21 lymphs. Sodium 133 with a potassium of 4.0.  BUN is 16 with a creatinine of 1.3. Glucose of 223. Bicarb of 24. Troponin less than 0.01. Influenza screen is negative. Lactic acid is 1.7. CRP of 67.7. Urinalysis shows 18 white cells, 2 red cells, 6 epithelial cells. Chest x-ray: No acute cardiopulmonary abnormalities. This patient is febrile at 101.4. The source of her fever is not clear. She has some minimal pyuria, it is possibly the source of her fever, but not likely. She has some increased warmth in her right knee at the site of her recent surgery.   She has no large effusion or significant pain with range of motion that would suggest an infected knee joint. An early cellulitis would certainly be possible. I think her elevated CRP is more likely related to her sarcoidosis. She has no evidence of pneumonia. Were going to cover her with Keflex and Bactrim. This will provide some soft tissue coverage as well as  coverage pending her urine culture. Her creatinine is minimally elevated. She will need close follow-up as an outpatient. She will be instructed return if worse or new symptoms develop. I do not think acute hospitalization is indicated. Test results, diagnosis, and treatment plan were discussed with the patient. She understands the treatment plan and follow-up as discussed.     (Please note that portions of this note were completed with a voice recognition program.  Efforts were made to edit the dictations but occasionally words are mis-transcribed.)    Mackenzie Barnett MD  Attending Emergency Physician        Suha Royal MD  03/23/20 8453

## 2020-03-27 PROBLEM — J18.9 HCAP (HEALTHCARE-ASSOCIATED PNEUMONIA): Status: ACTIVE | Noted: 2020-01-01

## 2020-03-27 NOTE — ED PROVIDER NOTES
MULTIVITAMIN-MINERALS) TABLET    Take 1 tablet by mouth daily    PANTOPRAZOLE (PROTONIX) 40 MG TABLET    Take 40 mg by mouth daily    ROSUVASTATIN (CRESTOR) 10 MG TABLET    Take 1 tablet by mouth daily    SERTRALINE (ZOLOFT) 25 MG TABLET    Take 25 mg by mouth daily     STOOL SOFTENER 100 MG CAPSULE    Take 100 mg by mouth 2 times daily as needed for Constipation    SULFAMETHOXAZOLE-TRIMETHOPRIM (BACTRIM DS) 800-160 MG PER TABLET    Take 1 tablet by mouth 2 times daily for 7 days    TRAMADOL (ULTRAM) 50 MG TABLET    Take 50 mg by mouth 4 times daily as needed for Pain. VITAMIN B-12 (CYANOCOBALAMIN) 500 MCG TABLET    Take 500 mcg by mouth daily         Review of Systems:  Review of Systems   Constitutional: Positive for chills and fever. HENT: Negative for congestion, facial swelling and sore throat. Eyes: Negative for discharge and redness. Respiratory: Positive for cough. Negative for apnea, choking and shortness of breath. Cardiovascular: Positive for chest pain. Gastrointestinal: Negative for abdominal pain, nausea and vomiting. Genitourinary: Negative for dysuria. Musculoskeletal: Positive for myalgias. Negative for back pain, neck pain and neck stiffness. Neurological: Positive for weakness and headaches. Negative for dizziness, tremors and seizures. All other systems reviewed and are negative. Positives and Pertinent negatives as per HPI. Except as noted above in the ROS, problem specific ROS was completed and is negative. Physical Exam:  Physical Exam  Vitals signs and nursing note reviewed. Constitutional:       Appearance: She is well-developed. She is not diaphoretic. HENT:      Head: Normocephalic and atraumatic. Nose: Nose normal.      Mouth/Throat:      Lips: Pink. Pharynx: Oropharynx is clear. Uvula midline. No pharyngeal swelling, oropharyngeal exudate, posterior oropharyngeal erythema or uvula swelling. Eyes:      General:         Right eye: No discharge. nontender. Nondistended. Normal bowel sounds all 4 quadrant. Right knee does show slight swelling and slight warmth compared to the left. Tenderness with palpation and with flexion extension. Full range of motion of extremity. Neurologically patient has no motor or sensory deficit noted. She is alert and oriented x4. Does not appear to be in acute distress. Lab results from today show CBC with a white count of 3.7, RBC 3.70, hemoglobin 10.3 and hematocrit 31.6. CMP shows sodium 134, potassium 4.7, chloride 98 with a BUN of 8 and creatinine 1.2. Normal transaminases. Lactic acid 1.4    Troponin less than 0.01    CT of chest without contrast shows that there are new extensive bilateral patchy area of groundglass opacity throughout both lungs. Findings are nonspecific but can be seen in edema or atypical pneumonia to include corona virus 19 in the correct clinical setting. Discussed CT results with attending physician Ector Guzman. Place order for pneumonia. I will place a call out to hospitalist service for admission through [facility]. I discussed this with the patient. She is okay with the plan. She will be admitted in stable condition. The patient tolerated their visit well. I evaluated the patient. The physician was available for consultation as needed. The patient and / or the family were informed of the results of any tests, a time was given to answer questions, a plan was proposed and they agreed with plan. CLINICAL IMPRESSION:  1. Dyspnea, unspecified type    2. Hypoxia    3. Pneumonia of both lungs due to infectious organism, unspecified part of lung        DISPOSITION  DISPOSITION Decision To Admit 03/27/2020 04:58:42 PM          PATIENT REFERRED TO:  No follow-up provider specified.     DISCHARGE MEDICATIONS:  New Prescriptions    No medications on file       DISCONTINUED MEDICATIONS:  Discontinued Medications    No medications on file              (Please note the MDM

## 2020-03-27 NOTE — ED TRIAGE NOTES
Pt presents to ED via PV from home with c/o of SOB and cough worsening over the past week. +productive cough. NSR noted on monitor. +fever x2 weeks. Post op right knee replacement 1.5 months ago. Resp even and unlabored. A/ox4. No acute distress noted. Call light within reach. Bed in lowest position. Will continue to monitor.

## 2020-03-27 NOTE — ED PROVIDER NOTES
(A) >60    GFR  53 (A) >60    Calcium 8.3 8.3 - 10.6 mg/dL    Total Protein 6.4 6.4 - 8.2 g/dL    Alb 3.1 (L) 3.4 - 5.0 g/dL    Albumin/Globulin Ratio 0.9 (L) 1.1 - 2.2    Total Bilirubin 0.4 0.0 - 1.0 mg/dL    Alkaline Phosphatase 71 40 - 129 U/L    ALT 8 (L) 10 - 40 U/L    AST 21 15 - 37 U/L    Globulin 3.3 g/dL   Lactic Acid, Plasma   Result Value Ref Range    Lactic Acid 1.4 0.4 - 2.0 mmol/L   Troponin   Result Value Ref Range    Troponin <0.01 <0.01 ng/mL     Xr Knee Right (1-2 Views)    Result Date: 3/27/2020  EXAMINATION: 2 XRAY VIEWS OF THE RIGHT KNEE 3/27/2020 2:22 pm COMPARISON: 03/24/2020 HISTORY: ORDERING SYSTEM PROVIDED HISTORY: Pain and swelling. Recent total knee replacement February 12, 2020 TECHNOLOGIST PROVIDED HISTORY: Reason for exam:->Pain and swelling. Recent total knee replacement February 12, 2020 Reason for Exam: Pain and swelling. Recent total knee replacement February 12, 2020 Acuity: Acute Type of Exam: Initial FINDINGS: Soft tissue swelling is improved. A cemented knee arthroplasty appear stable. There is no fracture, bone destruction or evidence of component loosening. A small joint effusion may be present. No acute abnormality. Improving soft tissue swelling     Ct Chest Wo Contrast    Result Date: 3/27/2020  EXAMINATION: CT OF THE CHEST WITHOUT CONTRAST 3/27/2020 2:26 pm TECHNIQUE: CT of the chest was performed without the administration of intravenous contrast. Multiplanar reformatted images are provided for review. Dose modulation, iterative reconstruction, and/or weight based adjustment of the mA/kV was utilized to reduce the radiation dose to as low as reasonably achievable.  COMPARISON: Chest radiograph March 24, 2020 and chest CT March 23, 2020 HISTORY: ORDERING SYSTEM PROVIDED HISTORY: Shortness of breath and fever TECHNOLOGIST PROVIDED HISTORY: Reason for exam:->Shortness of breath and fever Reason for Exam: Shortness of breath and fever Acuity: Acute

## 2020-03-28 PROBLEM — A41.9 SEPSIS (HCC): Status: ACTIVE | Noted: 2020-01-01

## 2020-03-28 PROBLEM — J96.01 ACUTE RESPIRATORY FAILURE WITH HYPOXIA (HCC): Status: ACTIVE | Noted: 2020-01-01

## 2020-03-28 NOTE — H&P
distress, appears stated age and cooperative. HEENT:  Normal cephalic, atraumatic without obvious deformity. Pupils equal, round, and reactive to light. Extra ocular muscles intact. Conjunctivae/corneas clear. Neck: Supple, with full range of motion. No jugular venous distention. Trachea midline. Respiratory:  Normal respiratory effort. Equal excursion bilaterally without use of accessory muscles  Cardiovascular:  Regular rate and rhythm with normal S1/S2 without murmurs, rubs or gallops. Abdomen: Soft, non-tender, non-distended with normal bowel sounds. Musculoskeletal:  No clubbing, cyanosis (+) R calf tenderness / edema bilaterally. Skin: Skin color, texture, turgor normal.  No rashes or lesions. Neurologic:  Neurovascularly intact without any focal sensory/motor deficits. Cranial nerves: II-XII intact, grossly non-focal.  Psychiatric:  Alert and oriented, thought content appropriate, normal insight  Capillary Refill: Brisk,< 3 seconds   Peripheral Pulses: +2 palpable, equal bilaterally       Labs:     Recent Labs     03/27/20  1446   WBC 3.7*   HGB 10.3*   HCT 31.6*        Recent Labs     03/27/20  1446   *   K 4.7   CL 98*   CO2 23   BUN 8   CREATININE 1.2   CALCIUM 8.3     Recent Labs     03/27/20  1446   AST 21   ALT 8*   BILITOT 0.4   ALKPHOS 71     No results for input(s): INR in the last 72 hours. Recent Labs     03/27/20  1446   TROPONINI <0.01       Urinalysis:      Lab Results   Component Value Date    NITRU Negative 03/23/2020    WBCUA 18 03/23/2020    BACTERIA 2+ 07/11/2017    RBCUA 2 03/23/2020    BLOODU Negative 03/23/2020    SPECGRAV 1.017 03/23/2020    GLUCOSEU Negative 03/23/2020    GLUCOSEU 2+ 12/22/2011       Radiology:          CT CHEST WO CONTRAST   Final Result   There are new extensive bilateral patchy areas of ground-glass opacity   throughout both lungs.   Findings are nonspecific but can be seen in edema or   atypical/viral pneumonia, to include COVID 19 in the

## 2020-03-28 NOTE — PROGRESS NOTES
Nutrition Assessment    Type and Reason for Visit: Initial, Positive Nutrition Screen    Nutrition Recommendations:   Carb Control diet   Glucerna TID  Will monitor nutritional adequacy, nutrition-related labs, weights, BMs, and clinical progress     Nutrition Assessment: + Screen for malnutrition score of 2. Pt admitted with PNA, suspected Covid-19. Per nsg screen, pt reported poor appetite and intake PTA. Will trial supplement. Malnutrition Assessment:  · Malnutrition Status: At risk for malnutrition    Nutrition Risk Level: High    Nutrient Needs:  · Estimated Daily Total Kcal: 5688-8018 kcal (22-25 kcal/kg ABW)  · Estimated Daily Protein (g):  gm (1-1.2 gm/kg ABW)  · Estimated Daily Total Fluid (ml/day): 1 ml/kcal     Nutrition Diagnosis:   · Problem: Inadequate oral intake  · Etiology: related to Insufficient energy/nutrient consumption     Signs and symptoms:  as evidenced by Diet history of poor intake, Weight loss    Objective Information:  · Nutrition-Focused Physical Findings: No edema per nsg assessment   · Wound Type: None  · Current Nutrition Therapies:  · Oral Diet Orders: Carb Control 4 Carbs/Meal   · Oral Diet intake: Unable to assess  · Oral Nutrition Supplement (ONS) Orders: None  · ONS intake: Unable to assess  · Anthropometric Measures:  · Ht: 5' 10\" (177.8 cm)   · Current Body Wt: 195 lb (88.5 kg)  · Usual Body Wt: 217 lb (98.4 kg)  · % Weight Change:  ,  -10% x 4 months per EMR  · Ideal Body Wt: 160 lb (72.6 kg),   · BMI Classification: BMI 25.0 - 29.9 Overweight    Nutrition Interventions:   Continue current diet, Start ONS  Continued Inpatient Monitoring    Nutrition Evaluation:   · Evaluation: Goals set   · Goals:  Tolerate diet and consume greater than 50% of meals and supplements this admission     · Monitoring: Meal Intake, Supplement Intake, Diet Tolerance, Skin Integrity, Weight, Pertinent Labs, Nausea or Vomiting, Diarrhea, Constipation      Electronically signed by

## 2020-03-28 NOTE — CONSULTS
Clinical Pharmacy Note  Vancomycin Consult    Laurie  is a 65 y.o. female ordered Vancomycin for HCAP; consult received from Dr. Meggan Blunt to manage therapy. Also receiving Cefepime. Patient Active Problem List   Diagnosis    Sarcoidosis    Diabetes mellitus with hyperglycemia (HCC)    Degenerative arthritis of knee    Seizure disorder (HCC)    Diverticulosis    Headaches, cluster    Chest pain    Trigger finger    CAD (coronary artery disease)    Diastolic dysfunction, left ventricle    TIFFANY (obstructive sleep apnea)    Restrictive lung disease    Arthritis of right hip    HTN (hypertension)    Anemia    H/O total hip arthroplasty, right-2017    Arthritis of left knee    Arthritis of right knee    Hyperlipidemia    Vertigo    Ulnar neuropathy of both upper extremities    Anesthesia of skin    Degeneration of lumbar or lumbosacral intervertebral disc    Displacement of lumbar intervertebral disc without myelopathy    Lumbar radiculopathy    HCAP (healthcare-associated pneumonia)       Allergies:  Latex; Molds & smuts; Dye [iodides]; Pollen extract; Hydrocodone; Esomeprazole sodium; Ibuprofen; Lyrica [pregabalin]; Morphine; and Oxycontin [oxycodone hcl]     Temp max:  Temp (24hrs), Av.5 °F (37.5 °C), Min:97.8 °F (36.6 °C), Max:102.1 °F (38.9 °C)      Recent Labs     20  1446   WBC 3.7*       Recent Labs     20  1446   BUN 8   CREATININE 1.2         Intake/Output Summary (Last 24 hours) at 3/27/2020 2157  Last data filed at 3/27/2020 1819  Gross per 24 hour   Intake 200 ml   Output --   Net 200 ml       Culture Results:  pending    Ht Readings from Last 1 Encounters:   20 5' 10\" (1.778 m)        Wt Readings from Last 1 Encounters:   20 195 lb 5.2 oz (88.6 kg)         Estimated Creatinine Clearance: 51 mL/min (based on SCr of 1.2 mg/dL). Assessment/Plan:  Vancomycin 1,500 mg IVPB x 1 ordered upon arrival to the floor.   Patient's serum creatinine is 1.2 mg/dL today and is up slightly from baseline (0.8 mg/dL). Clinical pharmacist will reassess patient's renal function in the AM and advise on subsequent dosing / levels at that time. Thank you for the consult. Will continue to follow.     Frankie CoyneD, BCPS  3/27/2020 9:59 PM

## 2020-03-29 NOTE — PROGRESS NOTES
Patient had small emesis this evening. Then was able to feed self and tolerate applesauce. Saturations dropped into the 80%'s with movement and minimal exertion. Oxygen was increased from 4 L to 6 L per nasal cannula. Oxygen saturation 92% at rest on 6 L.

## 2020-03-29 NOTE — PROGRESS NOTES
(coronary artery disease)    HTN (hypertension)    HCAP (healthcare-associated pneumonia)    Sepsis ([facility] [address]  Resolved Problems:    * No resolved hospital problems. *      Plan:  1. Acute resp failure with hypoxia - pO2 down to 68 on 15L high flow - able to wean down to 13L at this time - remain in ICU for now in case of need for intubation  2. Sepsis POA based on fever, lymphopenia, bilateral infiltrates, tachypnea  3. Possible COVID 19 infection - higher probability based on clinical sx, leukopenia, infiltrates, fevers, acute resp failure with hypoxia  4. DMII accuchecks and SSI  5. HTN and CAD - continue with ASA        Prognosis:  Fair    Code status:  FULL - see ACP note  DVT prophylaxis: [x] Lovenox  [] SQ Heparin  [] SCDs because  [] warfarin/oral direct thrombin inhibitor [] Encourage ambulation  GI prophylaxis: [] PPI/[id number]  [x] not indicated  Antibiotic prophylaxis indicated:   Yes  Diet:  DIET CARB CONTROL;   Dietary Nutrition Supplements: Diabetic Oral Supplement  Disposition  Continue ICU    Medications:  Scheduled Meds:   lactobacillus  1 capsule Oral BID     hydroxychloroquine  400 mg Oral Q12H    Followed by   [facility] hydroxychloroquine  200 mg Oral Q12H    vancomycin  1,500 mg Intravenous Q18H    cefepime  2 g Intravenous Q12H    aspirin  81 mg Oral Daily    insulin glargine  50 Units Subcutaneous Daily    pantoprazole  40 mg Oral Daily    rosuvastatin  10 mg Oral Daily    sertraline  25 mg Oral Daily    insulin lispro  0-6 Units Subcutaneous TID WC    insulin lispro  0-3 Units Subcutaneous Nightly    sodium chloride flush  10 mL Intravenous 2 times per day    enoxaparin  40 mg Subcutaneous Daily    vancomycin (VANCOCIN) intermittent dosing (placeholder)   Other RX Placeholder       PRN Meds:  albuterol sulfate HFA, docusate sodium, glucose, dextrose, glucagon (rDNA), dextrose, sodium chloride flush, acetaminophen **OR** acetaminophen, polyethylene glycol, promethazine **OR**

## 2020-03-29 NOTE — PLAN OF CARE
Problem: Falls - Risk of:  Goal: Will remain free from falls  Description: Will remain free from falls  Outcome: Ongoing  Goal: Absence of physical injury  Description: Absence of physical injury  Outcome: Ongoing     Problem: Pain:  Goal: Pain level will decrease  Description: Pain level will decrease  Outcome: Ongoing  Goal: Control of acute pain  Description: Control of acute pain  Outcome: Ongoing  Goal: Control of chronic pain  Description: Control of chronic pain  Outcome: Ongoing     Problem: DAILY CARE  Goal: Daily care needs are met  Outcome: Ongoing     Problem: PAIN  Goal: Patient's pain/discomfort is manageable  Outcome: Ongoing     Problem: SKIN INTEGRITY  Goal: Skin integrity is maintained or improved  Outcome: Ongoing     Problem: Nutrition  Goal: Optimal nutrition therapy  3/28/2020 1214 by Estella Medina RD, LD  Outcome: Ongoing

## 2020-03-29 NOTE — PROCEDURES
Arterial Catheter Insertion Procedure Note    Consent: The patient provided verbal consent for this procedure. Procedure: Insertion of Arterial Catheter    Indications:  Hypotension, Shock    Estimated Blood Loss: Minimal    Procedure Details   Informed consent was obtained for the procedure, including sedation. Risks of hemorrhage, arrhythmia, infection and adverse drug reaction were discussed. Ultrasound used and Right Femoral artery was noted to be patent. Under sterile conditions the skin above the Right Femoral artery was prepped with Chloraprep and covered with a sterile drape after donning mask, sterile gown, annd sterile gloves. Local anesthesia using 1% Lidocaine was injected into the skin and subcutaneous tissues. A 22-gauge needle was inserted into the Right Femoral artery under active ultrasound guidance. A guide wire was then passed easily through the needle which was advanced with no resistance. Good pulsatile blood returned. The catheter was advanced over the existing wire without resistance or complication and subsequently sutured into place after pressure tubing connected and waveform verified on monitor. Findings: There were no complications nor changes to vital signs. Patient tolerated procedure well.     Total time of procedure: 15 minutes    JACKSON PalmaP  [facility] Pulmonary, Sleep, and Critical Care

## 2020-03-29 NOTE — PROCEDURES
Intubation Procedure Note    Indication: respiratory failure    Consent: The patient provided verbal consent for this procedure. Procedure:  Sedation: midazolam and etomidate  Paralytic: rocuronium  Equipment: GlideScope; 7.5mm cuffed endotracheal tube. View: Grade 1    Procedure: GlideScope was used to visualize vocal cords, tube passed through vocal cords without complication and stylet removed. The cuff was then inflated and the tube was secured appropriately at a distance of 24 cm to the dental ridge and Tiera was applied to secure ETT in place.     Confirmed by: bilateral breath sounds, an end tidal CO2 detector, adequate chest rise, and adequate pulse oximetry reading; CXR ordered STAT for placement verification    Franchot Signs, ACNP  [facility] Pulmonary, Sleep, and Critical Care

## 2020-03-29 NOTE — PROGRESS NOTES
New 3/29/2020  2:03 PM   Number of days: 0           Assessment:     Acute hypoxemic respiratory failure with SaO2 less than 90% on room air  Acute respiratory distress syndrome  Multifocal pneumonia  Sepsis  DMII    Plan:      -Intubated urgently in the ICU, continue full vent support.  -Wean FiO2 goal SaO2 88 to 92%  -She has been started on Plaquenil  -She is on cefepime and vancomycin, MRSA nares negative can DC vancomycin  -Check pro-Murtaza may benefit from atypical antibiotic coverage but likely does not need broad-spectrum antibiotics with cefepime. It does appear consistent with novel coronavirus. Test pending    Peridex  Lovenox  Pepcid  Lactobacillus while on broad-spectrum antibiotics    Due to the immediate potential for life-threatening deterioration due to acute hypoxemic respiratory failure, I spent 36 minutes providing critical care. This time is excluding time spent performing separately billable procedures. This note was transcribed using [id number] [address]. Please disregard any translational errors.     Thank you for the consult    [address]  Pulmonary, Sleep and Critical Care Medicine

## 2020-03-30 NOTE — PROGRESS NOTES
Pulmonary Progress Note    Date of Admission: 3/27/2020   LOS: 3 days       CC:  Acute hypoxemia    Subjective: Intubated and lined yesterday. Propofol for sedation. Worsening hypoxemia. fio2 70% peep 12. ROS:   Unable to assess       PHYSICAL EXAM:   Blood pressure (!) 101/43, pulse 84, temperature 99.2 °F (37.3 °C), temperature source Axillary, resp. rate 18, height 5' 10\" (1.778 m), weight 200 lb 2.8 oz (90.8 kg), last menstrual period 05/18/1975, SpO2 100 %, not currently breastfeeding.'  Gen: No distress. Awake. Eyes: PERRL. No sclera icterus. No conjunctival injection. ENT: No discharge. Pharynx clear. External appearance of ears and nose normal.  Neck: Trachea midline. No obvious mass. Resp: No accessory muscle use. No crackles. No wheezes. No rhonchi. On vent. ETT 25 cm. CV: Regular rate. Regular rhythm. No murmur or rub. No edema. GI: Non-tender. Non-distended. No hernia. Skin: Warm, dry, normal texture and turgor. No nodule on exposed extremities. Lymph: No cervical LAD. No supraclavicular LAD. M/S: No cyanosis. No clubbing. No joint deformity. Neuro: Moves all four extremities. Psych: wake.  Writing     Medications:    Scheduled Meds:   hydroxychloroquine  200 mg Oral BID    chlorhexidine  15 mL Mouth/Throat BID    famotidine (PEPCID) injection  20 mg Intravenous BID    lactobacillus  2 capsule Oral BID     insulin lispro  0-6 Units Subcutaneous Q4H    vancomycin  1,500 mg Intravenous Q18H    cefepime  2 g Intravenous Q12H    aspirin  81 mg Oral Daily    insulin glargine  50 Units Subcutaneous Daily    rosuvastatin  10 mg Oral Daily    sertraline  25 mg Oral Daily    sodium chloride flush  10 mL Intravenous 2 times per day    enoxaparin  40 mg Subcutaneous Daily    vancomycin (VANCOCIN) intermittent dosing (placeholder)   Other RX Placeholder       Continuous Infusions:   fentaNYL Stopped (03/29/20 1718)    propofol 10 mcg/kg/min (03/30/20 0118)    dextrose         PRN Meds:  albuterol sulfate HFA, fentanNYL, docusate sodium, glucose, dextrose, glucagon (rDNA), dextrose, sodium chloride flush, acetaminophen **OR** acetaminophen, polyethylene glycol, promethazine **OR** ondansetron    Labs reviewed:  CBC:   Recent Labs     03/27/20  1446 03/28/20  0851 03/30/20  0415   WBC 3.7* 3.5* 5.6   HGB 10.3* 10.1* 9.3*   HCT 31.6* 31.2* 28.2*   MCV 85.4 84.4 83.8    209 251     BMP:   Recent Labs     03/27/20  1446 03/28/20  0851 03/30/20  0415   * 133* 138   K 4.7 4.2 3.9   CL 98* 101 105   CO2 23 21 20*   BUN 8 8 6*   CREATININE 1.2 0.9 0.7     LIVER PROFILE:   Recent Labs     03/27/20  1446 03/28/20  0851   AST 21 22   ALT 8* 9*   BILIDIR  --  <0.2   BILITOT 0.4 0.3   ALKPHOS 71 68     PT/INR: No results for input(s): PROTIME, INR in the last 72 hours. APTT: No results for input(s): APTT in the last 72 hours. UA:  Recent Labs     03/29/20  1510   COLORU YELLOW   PHUR 5.5   WBCUA 3   RBCUA 6*   CLARITYU CLOUDY*   SPECGRAV 1.010   LEUKOCYTESUR Negative   UROBILINOGEN 0.2   BILIRUBINUR Negative   BLOODU Negative   GLUCOSEU Negative     No results for input(s): PH, PCO2, PO2 in the last 72 hours. Cx:      Films:  Radiology Review:  Pertinent images / reports were reviewed as a part of this visit. CT Chest w/ contrast: No results found for this or any previous visit. CT Chest w/o contrast:   Results for orders placed during the hospital encounter of 03/27/20   CT CHEST WO CONTRAST    Narrative EXAMINATION:  CT OF THE CHEST WITHOUT CONTRAST 3/27/2020 2:26 pm    TECHNIQUE:  CT of the chest was performed without the administration of intravenous  contrast. Multiplanar reformatted images are provided for review. Dose  modulation, iterative reconstruction, and/or weight based adjustment of the  mA/kV was utilized to reduce the radiation dose to as low as reasonably  achievable.     COMPARISON:  Chest radiograph March 24, 2020 and chest CT March 23, exam:->weakness    FINDINGS:  Pulmonary Arteries: Pulmonary arteries are adequately opacified for  evaluation. No evidence of intraluminal filling defect to suggest pulmonary  embolism. Main pulmonary artery is normal in caliber. Mediastinum: No evidence of mediastinal lymphadenopathy. The heart and  pericardium demonstrate no acute abnormality. There is no acute abnormality  of the thoracic aorta. Lungs/pleura: The lungs are without acute process. Areas of scarring can be  seen in the upper and lower lobes which are likely chronic. No focal  consolidation or pulmonary edema. No evidence of pleural effusion or  pneumothorax. Upper Abdomen: Limited images of the upper abdomen are unremarkable. Soft Tissues/Bones: No acute bone or soft tissue abnormality. Impression No evidence of pulmonary embolism or acute pulmonary abnormality. Areas of  scarring/atelectasis seen in the upper and lower lobes appears chronic. CXR PA/LAT:   Results for orders placed during the hospital encounter of 07/27/18   XR CHEST STANDARD (2 VW)    Narrative EXAMINATION:  TWO VIEWS OF THE CHEST    7/27/2018 3:14 pm    COMPARISON:  11/06/2017 radiograph    HISTORY:  ORDERING PHYSICIAN PROVIDED HISTORY: Chest Discomfort  TECHNOLOGIST PROVIDED HISTORY:  Technologist Provided Reason for Exam: chest pain/ pressure. SOB started  today after root canal procedure. . dizziness  Acuity: Acute  Type of Encounter: Initial    FINDINGS:  The heart, mediastinum and pulmonary vascularity are normal.  The lungs are  lucent with scattered interstitial opacities that are stable. Band of  atelectasis versus fibrotic change in the left upper lobe. No acute  pulmonary findings. There are no skeletal abnormalities.       Impression Stable interstitial changes with no acute finding in the chest.         CXR portable:   Results for orders placed during the hospital encounter of 03/27/20   XR CHEST PORTABLE    Narrative EXAMINATION:  ONE

## 2020-03-30 NOTE — PROGRESS NOTES
Hospitalist ICU Progress Note    CC: Acute respiratory failure with hypoxia [facility])    Hospital course:  65yo F with PMhx sig for GERD, sarcoidosis, RA, DMII, presents with several weeks of shortness of breath and had multiple visits to EDs then became increasingly worse and required admission. Pt with sepsis POA based on leukopenia, tachypnea, acute resp failure with hypoxia, bilateral ground glass opacities, fevers. Possible COVID19 exposure. Overnight pt sats dropped to 84% on 6L oxygen and pt oxygen increased to 15L high flow. Pt transferred to ICU and then urgently intubated. CXR looks like possible ARDS. Admit date: 3/27/2020  Days in hospital:  3    24 Hour Events: looks comfortable on vent    Subjective: somewhat agitated when I woke her up, but able to be calmed and trying to write - still with high revers, on 70% FIO2    ROS:  Unable to as ROS due to intubated and sedated    Objective:    BP (!) 101/43   Pulse 85   Temp 102.1 °F (38.9 °C) (Oral)   Resp 22   Ht 5' 10\" (1.778 m)   Wt 200 lb 2.8 oz (90.8 kg)   LMP 05/18/1975   SpO2 100%   BMI 28.72 kg/m²     Gen: ill appearing, mildly diaphoretic  HEENT: NC/AT, moist mucous membranes, no oropharyngeal erythema or exudate    Neck: supple, trachea midline, no anterior cervical or SC LAD  Heart:  Normal s1/s2, RRR, no murmurs, gallops, or rubs. no leg edema  Lungs:  Some crackles bilaterally, no wheeze, no rales, no rhonchi, some bilat crackles, no use of accessory muscles  Abd: bowel sounds present, soft, nontender, nondistended, no masses  Extrem:  No clubbing, cyanosis,  no edema, peripheral pulses 2+, brisk capillary refill  Skin: no rashes or lesions, normal color/perfusion  Psych:  Awakens, but unable to fully assess due to intubation and sedation  Neuro: grossly intact, moves all four extremities.            Assessment:    Principal Problem:    Acute respiratory failure with hypoxia (HCC)  Active acetaminophen, polyethylene glycol, promethazine **OR** ondansetron    IV:   fentaNYL Stopped (03/29/20 1718)    propofol 10 mcg/kg/min (03/30/20 0118)    dextrose           Intake/Output Summary (Last 24 hours) at 3/30/2020 1006  Last data filed at 3/30/2020 0954  Gross per 24 hour   Intake 869.51 ml   Output 1190 ml   Net -320.49 ml       Results:  CBC:   Recent Labs     03/27/20  1446 03/28/20  0851 03/30/20  0415   WBC 3.7* 3.5* 5.6   HGB 10.3* 10.1* 9.3*   HCT 31.6* 31.2* 28.2*   MCV 85.4 84.4 83.8    209 251     BMP:   Recent Labs     03/27/20  1446 03/28/20  0851 03/30/20  0415   * 133* 138   K 4.7 4.2 3.9   CL 98* 101 105   CO2 23 21 20*   BUN 8 8 6*   CREATININE 1.2 0.9 0.7     Mag: No results for input(s): MAG in the last 72 hours. LIVER PROFILE:   Recent Labs     03/27/20  1446 03/28/20  0851   AST 21 22   ALT 8* 9*   BILIDIR  --  <0.2   BILITOT 0.4 0.3   ALKPHOS 71 68     PT/INR: No results for input(s): PROTIME, INR in the last 72 hours. APTT: No results for input(s): APTT in the last 72 hours.   UA:  Recent Labs     03/29/20  1510   COLORU YELLOW   PHUR 5.5   WBCUA 3   RBCUA 6*   CLARITYU CLOUDY*   SPECGRAV 1.010   LEUKOCYTESUR Negative   UROBILINOGEN 0.2   BILIRUBINUR Negative   BLOODU Negative   GLUCOSEU Negative       ABG:   Lab Results   Component Value Date    PHART 7.441 03/30/2020    HSL1NUR 32.8 03/30/2020    PO2ART 107.0 03/30/2020    GSS2DJU 22.4 03/30/2020    BEART -1.4 03/30/2020    GSL4YTA 23.4 03/30/2020    L3IVALFO 98.5 03/30/2020       Lab Results   Component Value Date    CALCIUM 8.0 (L) 03/30/2020    PHOS 3.0 05/21/2011             Electronically signed by Evie Sharma MD on 3/30/2020 at 10:06 AM

## 2020-03-30 NOTE — PROGRESS NOTES
Nutrition Assessment (Enteral Nutrition)    Type and Reason for Visit: Reassess, Consult(TF)    Nutrition Recommendations:   Start nutrition via NG tube using Vital 1.2 AF formula goal rate of 75 ml per hour  Maintenance flush to start at 30 ml every 4 hours  Monitor Propofol dose for need to modify TF regimen    Nutrition Assessment: Pt with worsened respiratory status, requiring emergent intubation on 3/29. Pt is on Propofol at 5.4 ml per hour, adding 143 kcals per day. Pt meeting criteria for sepsis at this time. Malnutrition Assessment:  · Malnutrition Status: At risk for malnutrition  Due to current CDC guidelines recommending 6-ft distancing for social isolation for COVID19 prevention, NFPE/malnutrition assessment was deferred at this time. Nutrition Risk Level: High    Nutrition Needs:  · Estimated Daily Total Kcal: 5592-1519 kcal (22-25 kcal/kg ABW)  · Estimated Daily Protein (g):  gm (1-1.2 gm/kg ABW)  · Estimated Daily Fluid (ml/day): 1 ml/kcal     Nutrition Diagnosis:   · Problem: Inadequate oral intake  · Etiology: related to Insufficient energy/nutrient consumption     Signs and symptoms:  as evidenced by Diet history of poor intake, Weight loss    Objective Information:  · Nutrition-Focused Physical Findings: Noted no edema. · Wound Type: None  · Current Nutrition Therapies:  · Oral Diet Orders: NPO   · Oral Diet intake: NPO  · Oral Nutrition Supplement (ONS) Orders: None  · ONS intake: NPO  · Tube Feeding (TF) Orders:   · Feeding Route: Nasogastric  · Formula: Semi-elemental  · Rate (ml/hr):75 (rate adjusted for routine Nursing care (~20 hour run) & Propofol dose)    · Volume (ml/day): 1500  · Duration: Continuous  · Water Flushes: tube maintenance of 30 ml every 4 hours  · Current TF & Flush Orders Provides: 1500 ml TF / 1800 kcals / 113 gms pro / 1217ml free water per day. Current maintenance flush adds 180 ml free water per day.   · Additional Calories: Propofol at 5.4 ml per hour adding 143 kcals per day.    · Anthropometric Measures:  · Ht: 5' 10\" (177.8 cm)   · Current Body Wt: 200 lb (90.7 kg)  · Usual Body Wt: 217 lb (98.4 kg)  · Weight Change: -10% x 4 months per EMR   · Ideal Body Wt: 160 lb (72.6 kg), % Ideal Body    · BMI Classification: BMI 25.0 - 29.9 Overweight    Nutrition Interventions:   Start Tube Feeding  Continued Inpatient Monitoring    Nutrition Evaluation:   · Evaluation: Goals set   · Goals: tolerate most appropriate form of nutrition   · Monitoring: TF Intake, TF Tolerance, Skin Integrity, Weight, Pertinent Labs, Nausea or Vomiting, Diarrhea, Constipation      Electronically signed by Diogenes Gerardo RD, LD on 3/30/20 at 11:54 AM EDT    Contact Number: [phone]

## 2020-03-31 NOTE — PROGRESS NOTES
Hospitalist Progress Note    CC: Acute respiratory failure with hypoxia ([facility] [address]      Admit date: 3/27/2020  Days in hospital:  4    Subjective/interval history: Pt S/E. Pt still intubated, sedated. With worsening hypoxemia, fio2 increased to 70% this afternoon. Given ivf boluses with modest increase in uop, but uop down overall in the past 24 hrs. ROS:   Pertinent items are noted in HPI. Objective:    BP (!) 95/44   Pulse 91   Temp 100 °F (37.8 °C) (Axillary)   Resp 14   Ht 5' 10\" (1.778 m)   Wt 201 lb 4.5 oz (91.3 kg)   LMP 05/18/1975   SpO2 98%   BMI 28.88 kg/m²     Gen: intubated, sedated  HEENT: NC/AT  Neck: supple, trachea midline  Heart: Normal s1/s2, RRR, no murmurs, gallops, or rubs. Lungs: no accessory muscle use, no wheezing  Abd: bowel sounds present, soft, nontender, nondistended, no masses  Extrem: No clubbing, cyanosis, no edema  Skin: no rashes or lesions  Psych: opens eyes to voice, writes to communicate  Neuro: grossly intact, moves all four extremities spontaneously.    Cap refill: +2 sec    Medications:  Scheduled Meds:   mupirocin   Topical BID    mupirocin   Nasal BID    sodium chloride  1,000 mL Intravenous Once    hydroxychloroquine  200 mg Oral BID    chlorhexidine  15 mL Mouth/Throat BID    famotidine (PEPCID) injection  20 mg Intravenous BID    lactobacillus  2 capsule Oral BID     insulin lispro  0-6 Units Subcutaneous Q4H    cefepime  2 g Intravenous Q12H    aspirin  81 mg Oral Daily    insulin glargine  50 Units Subcutaneous Daily    rosuvastatin  10 mg Oral Daily    sertraline  25 mg Oral Daily    sodium chloride flush  10 mL Intravenous 2 times per day    enoxaparin  40 mg Subcutaneous Daily       PRN Meds:  albuterol sulfate HFA, fentanNYL, glucose, dextrose, glucagon (rDNA), dextrose, sodium chloride flush, acetaminophen **OR** acetaminophen, polyethylene glycol, promethazine **OR** ondansetron    IV:   sodium chloride 75 mL/hr (03/30/20 2245)    fentaNYL Stopped (03/29/20 1718)    propofol 15 mcg/kg/min (03/30/20 0900)    dextrose           Intake/Output Summary (Last 24 hours) at 3/31/2020 0806  Last data filed at 3/31/2020 0516  Gross per 24 hour   Intake 3218.11 ml   Output 825 ml   Net 2393.11 ml       Results:  CBC:   Recent Labs     03/28/20  0851 03/30/20  0415 03/31/20  0525   WBC 3.5* 5.6 4.6   HGB 10.1* 9.3* 8.1*   HCT 31.2* 28.2* 24.7*   MCV 84.4 83.8 84.1    251 253     BMP:   Recent Labs     03/28/20  0851 03/30/20  0415 03/31/20  0525   * 138 138   K 4.2 3.9 3.6    105 110   CO2 21 20* 16*   BUN 8 6* 10   CREATININE 0.9 0.7 0.6     Mag: No results for input(s): MAG in the last 72 hours. Phos:   Lab Results   Component Value Date    PHOS 3.0 05/21/2011     No components found for: GLU    LIVER PROFILE:   Recent Labs     03/28/20  0851   AST 22   ALT 9*   BILIDIR <0.2   BILITOT 0.3   ALKPHOS 68     PT/INR: No results for input(s): PROTIME, INR in the last 72 hours. APTT: No results for input(s): APTT in the last 72 hours. UA:  Recent Labs     03/29/20  1510   COLORU YELLOW   PHUR 5.5   WBCUA 3   RBCUA 6*   CLARITYU CLOUDY*   SPECGRAV 1.010   LEUKOCYTESUR Negative   UROBILINOGEN 0.2   BILIRUBINUR Negative   BLOODU Negative   GLUCOSEU Negative       Invalid input(s): ABG  Lab Results   Component Value Date    CALCIUM 6.6 (L) 03/31/2020    PHOS 3.0 05/21/2011       Assessment:    Principal Problem:    Acute respiratory failure with hypoxia (HCC)  Active Problems:    Sarcoidosis    Diabetes mellitus with hyperglycemia (HCC)    CAD (coronary artery disease)    HTN (hypertension)    HCAP (healthcare-associated pneumonia)    Sepsis ([facility] [address]    Hypotension    Shock ([facility] [address]  Resolved Problems:    * No resolved hospital problems.  [facility] course:   65yo F with PMhx sig for GERD, sarcoidosis, RA, DMII, presents with several weeks of shortness of breath and had multiple visits to EDs then became increasingly worse and required admission.  Pt with sepsis POA based on leukopenia, tachypnea, acute resp failure with hypoxia, bilateral ground glass opacities, fevers.  Possible COVID19 exposure. Overnight pt sats dropped to 84% on 6L oxygen and pt oxygen increased to 15L high flow. Pt transferred to ICU and then urgently intubated. CXR looks like possible ARDS.     Plan:    Acute hypoxic respiratory failure, POA - ongoing  Developing ards?  - with criteria: < 90% on RA, accessory muscle use, RR> 18, due to possible covid 19 infection on baseline sarcoidosis  - supplemental oxygen as necessary to keep SaO2 > 90%, not on O2 at home  - intubated 3/29    Sepsis, POA  - with criteria: lymphopenia, fevers, tachypnea; source is lungs  - blood cultures  - changed cefepime and vanc to merrem due to increasing procalcitonin   - sputum and urine cultures sent  - procalcitonin .5 -> 1.13   - IVF    Possible COVID 19 infection   - higher probability based on clinical sx, leukopenia, infiltrates, fevers, acute resp failure with hypoxia   - cont plaquenil    Diabetes  - sugars going up today   - cont lantus, if sugars continue to be elevated will increase lantus and change to med ssi  - accuchecks    HTN and CAD   - continue with ASA    Sarcoidosis   - poor prognostic factor  - supportive care    Code status:  full  DVT prophylaxis: [x] Lovenox  [] SQ Heparin  [] SCDs because of  [] warfarin/oral direct thrombin inhibitor [] Encourage ambulation      Disposition:  [] Home [] Rehab [] Psych [] SNF  [] LTAC  [] Transfer to ICU  [] Transfer to PCU [] Other: icu      Electronically signed by Galo Cortez DO on 3/31/2020 at 8:06 AM

## 2020-04-01 NOTE — PROGRESS NOTES
critical care time with this patient excluding any procedures. This note was transcribed using [id number] [employer]. Please disregard any translational errors.       Pranav Bains [facility] [phone]

## 2020-04-01 NOTE — PROGRESS NOTES
Patient having recurrent desaturations post chest xray. Patient's 02 hanging at 80%. Recruitment maneuver performed. Patient tolerated well.  Electronically signed by Kassandra Izaguirre RCP on 4/1/2020 at 5:47 AM

## 2020-04-01 NOTE — CONSULTS
 Sleep apnea     does not use/past history pt states    Urinary incontinence        Past Surgical History:    Past Surgical History:   Procedure Laterality Date    APPENDECTOMY      BREAST SURGERY  2016    Bilateral mastectomy    CARPAL TUNNEL RELEASE Right     COLON SURGERY      history of colon cancer    EYE SURGERY      bilateral cataract removal 2010    FINGER TRIGGER RELEASE      X 4    FINGER TRIGGER RELEASE Left 2014    middle finger    HERNIA REPAIR  6162    umbilical    HIP SURGERY Right 2017    anterior hip replacement    HYSTERECTOMY      MICHAEL/USO then later other ovary    KNEE SURGERY      LAPAROTOMY      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 Allergies:  Latex; Molds & smuts; Dye [iodides]; Pollen extract; Hydrocodone; Esomeprazole sodium; Ibuprofen; Lyrica [pregabalin];  Morphine; and Oxycontin [oxycodone hcl]    Immunizations :   Immunization History   Administered Date(s) Administered    Influenza Virus Vaccine 2014    Influenza Whole 2012    Influenza, High Dose (Fluzone 65 yrs and older) 10/24/2013    Pneumococcal Polysaccharide (Dynutchmp74) 08/15/2013    Zoster Live (Zostavax) 2012         Social History:     Social History     Tobacco Use    Smoking status: Former Smoker     Packs/day: 0.70     Years: 2.00     Pack years: 1.40     Types: Cigarettes     Last attempt to quit: 1971     Years since quittin.9    Smokeless tobacco: Never Used   Substance Use Topics    Alcohol use: Yes     Comment: rare    Drug use: No     Social History     Tobacco Use   Smoking Status Former Smoker    Packs/day: 0.70    Years: 2.00    Pack years: 1.40    Types: Cigarettes    Last attempt to quit: 1971    Years since quittin.9   Smokeless Tobacco Never Used      Family History   Problem Relation Age of Onset    Heart Disease Mother     Cirrhosis Mother     Diabetes Father     Prostate Cancer Father     Cancer Father     Cirrhosis Sister     Diabetes Sister         REVIEW OF SYSTEMS:    No fever / chills / sweats. No weight loss. No visual change, eye pain, eye discharge. No oral lesion, sore throat, dysphagia. Denies cough / sputum/Sob   Denies chest pain, palpitations/ dizziness  Denies nausea/ vomiting/abdominal pain/diarrhea. Denies dysuria or change in urinary function. Denies joint swelling or pain. No myalgia, arthralgia. No rashes, skin lesions   Denies focal weakness, sensory change or other neurologic symptoms  No lymph node swelling or tenderness. ROS not possible     PHYSICAL EXAM:      Vitals:  T max  102.1   BP (!) 95/44   Pulse 90   Temp 99.5 °F (37.5 °C) (Axillary)   Resp 16   Ht 5' 10\" (1.778 m)   Wt 208 lb 8.9 oz (94.6 kg)   LMP 1975   SpO2 96%   BMI 29.92 kg/m²   PHYSICAL EXAM:     In-person bedside physical examination deferred. ( based on new provisions and guidance offered by [facility] on 2020 in setting of COVID 19 outbreak and in order to preserve personal protective equipment in accordance with the flexibilities announced by CMS on 2020)   References: https://[email or web address]. [email or web address]/Resources/COVID-19/3_18%20Telemed%20Guidance%20Updated%20March%. pdf?nsv=[email or web address]                      [email or web address]://[email or web address]. [email or web address]/Resources/COVID-19/3_18%20Telemed%20Guidance%20Updated%20March%. pdf?vik=[email or web address] Other RX Placeholder    vancomycin  1,250 mg Intravenous Q12H    docusate  100 mg Oral Daily    polyethylene glycol  17 g Oral Daily    senna  10 mL Oral Nightly    meropenem  500 mg Intravenous Q6H    insulin lispro  0-12 Units Subcutaneous Q4H    insulin glargine  60 Units Subcutaneous Daily    mupirocin   Nasal BID    hydroxychloroquine  200 mg Oral BID    chlorhexidine  15 mL Mouth/Throat BID    famotidine (PEPCID) injection  20 mg Intravenous BID    lactobacillus  2 capsule Oral BID WC    aspirin  81 mg Oral Daily    rosuvastatin  10 mg Oral Daily    sertraline  25 mg Oral Daily    sodium chloride flush  10 mL Intravenous 2 times per day    enoxaparin  40 mg Subcutaneous Daily       Continuous Infusions:   fentaNYL 175 mcg/hr (04/01/20 1129)    propofol 10 mcg/kg/min (03/31/20 2234)    dextrose         PRN Meds:  albuterol sulfate HFA, fentanNYL, glucose, dextrose, glucagon (rDNA), dextrose, sodium chloride flush, acetaminophen **OR** acetaminophen, polyethylene glycol, promethazine **OR** ondansetron    Procal 2.07     Lymphopenia 0.6     Lactic acid  1.3       MICRO: cultures reviewed and updated by me   20 [id number]       Order Status: Completed Specimen: Urine, clean catch Updated: 04/01/20 1032    Urine Culture, Routine No growth at 18-36 hours   Narrative:     ORDER#: [id number]                          ORDERED BY: Dave Mckeon  SOURCE: Urine Clean Catch                  COLLECTED:  03/31/20 12:30  ANTIBIOTICS AT YG. :                      RECEIVED :  03/31/20 13:36  Performed at:  [facility] [address] [place], [place] [address]   Phone [phone]   Culture, Respiratory [[id number]] Collected: 03/31/20 0950   Order Status: Completed Specimen: Sputum, Suctioned Updated: 04/01/20 1014    CULTURE, RESPIRATORY Further report to follow   Narrative:     ORDER#: [id number]                          ORDERED BY: Dave Mckeon  SOURCE: Sputum Suctioned       [facility]  [address] [facility] [place], [place] [address]   Phone [phone]   MRSA DNA Probe, Nasal [[id number]]    Order Status: Canceled Specimen: Nares    COVID-19 [[id number]] Collected: 03/28/20 1214   Order Status: No result Updated: 03/28/20 1223   COVID-19, PCR [[id number]] Collected: 03/28/20 1138   Order Status: Canceled Specimen: Nasopharyngeal Swab    Rapid influenza A/B antigens [[id number]] Collected: 03/27/20 1446   Order Status: Completed Specimen: Nasopharyngeal Updated: 03/27/20 1527    Rapid Influenza A Ag Negative    Rapid Influenza B Ag Negative   Narrative:     Performed at:  [facility]  [address] [facility], [place] [address]   Phone [phone], PCR [[id number]]    Order Status: Canceled Specimen: Nasopharyngeal Swab          Urine Culture  Recent Labs     03/31/20  1230   LABURIN No growth at 18-36 hours     Imaging:   XR CHEST PORTABLE   Final Result   Significant interval worsening in appearance of parenchymal lung disease now   demonstrating diffuse bilateral airspace consolidation         XR CHEST PORTABLE   Final Result   1. Mild improved aeration of the right upper lobe         XR CHEST PORTABLE   Final Result   Significant progression in diffuse airspace opacities in the bilateral lungs. Pattern may represent ARDS. Pulmonary edema or atypical viral infection may   contribute to this pattern. VL Extremity Venous Bilateral   Final Result      CT CHEST WO CONTRAST   Final Result   There are new extensive bilateral patchy areas of ground-glass opacity   throughout both lungs. Findings are nonspecific but can be seen in edema or   atypical/viral pneumonia, to include COVID 19 in the correct clinical setting. Findings were discussed with Minerva Rogers at 3:10 pm on 3/27/2020. XR KNEE RIGHT (1-2 VIEWS)   Final Result   No acute abnormality.   Improving soft tissue swelling             All pertinent images and reports for the current Hospitalization were reviewed by me. IMPRESSION:    Patient Active Problem List   Diagnosis    Sarcoidosis    Diabetes mellitus with hyperglycemia (HCC)    Degenerative arthritis of knee    Seizure disorder (HCC)    Diverticulosis    Headaches, cluster    Chest pain    Trigger finger    CAD (coronary artery disease)    Diastolic dysfunction, left ventricle    TIFFANY (obstructive sleep apnea)    Restrictive lung disease    Arthritis of right hip    HTN (hypertension)    Anemia    H/O total hip arthroplasty, right-7.18.2017    Arthritis of left knee    Arthritis of right knee    Hyperlipidemia    Vertigo    Ulnar neuropathy of both upper extremities    Anesthesia of skin    Degeneration of lumbar or lumbosacral intervertebral disc    Displacement of lumbar intervertebral disc without myelopathy    Lumbar radiculopathy    HCAP (healthcare-associated pneumonia)    Sepsis ([address]    Acute respiratory failure with hypoxia ([address]    Hypotension    Shock ([address]     Sepsis and shock  ARDS  Resp failure  CT with Bi lateral ground glass opacities  Relative Lymphopenia  Progressive elevation in Procal  Fevers, and cough before presentation  Her Clinical presentation is very Consistent with COVID-19 PNEUMONIA and progressive radiological changes with clinical decline consistent with Pandemic COVID-19 Pneumonia    She remains very ill in ICU on the ventilator and given CXR ARDS is of concern - she has h/o breast cancer, DM and Sarcoidosis complicating recovery     Labs, Microbiology, Radiology and pertinent results from current hospitalization and care every where were reviewed by me as a part of the consultation. PLAN :  1. COVID-19 pcr IN process from 3/27  2. Finished x 5 days of Hydroxychlorquine  3. Add IV Azithromycin   4. Cont IV Meropenem to cover Secondary Bacterial infection until resp cx are final  5. Can d/c IV Vancomycin   6.  HIV screen, Hepatitis screen

## 2020-04-01 NOTE — PROGRESS NOTES
tachypnea, acute resp failure with hypoxia, bilateral ground glass opacities, fevers.  Possible COVID19 exposure. Overnight pt sats dropped to 84% on 6L oxygen and pt oxygen increased to 15L high flow. Pt transferred to ICU and then urgently intubated. CXR looks like possible ARDS. She has increasing vent settings, prognosis is grim. Her daughter was called and will possibly withdrawal care tomorrow.     Plan:    Acute hypoxic respiratory failure, POA - ongoing  ards   - with criteria: < 90% on RA, accessory muscle use, RR> 18, due to possible covid 19 infection on baseline sarcoidosis  - supplemental oxygen as necessary to keep SaO2 > 90%, not on O2 at home  - intubated 3/29    Sepsis, POA - ongoing  - with criteria: lymphopenia, fevers, tachypnea; source is lungs  - mrsa probe, rapid flu neg   - 3/27 blood cultures - ngtd  - cont merrem due to increasing procalcitonin, vanc restarted     - sputum and urine cultures sent 3/31 - pending  - procalcitonin .5 -> 1.13 ->2 today  - with increasing procalcitonin will consult ID     Possible COVID 19 infection   - higher probability based on clinical sx, leukopenia, infiltrates, fevers, acute resp failure with hypoxia   - pcr sent 3/28  - cont plaquenil     Diabetes  - sugars increased with the increase in tube feeds, improving today after the increase in insulin yesterday  - lantus increased to 55 units qam, med ssi  - accuchecks    Sarcoidosis   - poor prognostic factor  - supportive care    Code status:  full  DVT prophylaxis: [x] Lovenox  [] SQ Heparin  [] SCDs because of  [] warfarin/oral direct thrombin inhibitor [] Encourage ambulation      Disposition:  [] Home [] Rehab [] Psych [] SNF  [] LTAC  [] Transfer to ICU  [] Transfer to PCU [] Other: icu      Electronically signed by Dereck Tellez DO on 4/1/2020 at 8:24 AM

## 2020-04-02 PROBLEM — E44.0 MODERATE MALNUTRITION (HCC): Status: ACTIVE | Noted: 2020-01-01

## 2020-04-02 NOTE — CONSULTS
[address]                                  CONSULTATION    PATIENT NAME: Tamika Cooley                  :        1950  MED REC NO:   [id number]                          ROOM:       2109  ACCOUNT NO:   [de-identified]                           ADMIT DATE: 2020  PROVIDER:     Logan Nicolas MD    CONSULT DATE:  2020    REASON FOR CONSULTATION:  Acute kidney injury. HISTORY OF PRESENT ILLNESS:  A 66-year-old -American female with  a past medical history significant for rheumatoid arthritis,  sarcoidosis, seizure disorder, diabetes mellitus type 2, colon cancer  per record who was recently had a knee replacement in 2020, came back  complaining of cough, fatigue, shortness of breath. The patient was  diagnosed with bronchitis/pneumonia, admitted for further workup and  management. The patient was admitted for COVID rule out. Her oxygen  saturation continued to worsen requiring high FiO2 despite being  aggressively vented. Infectious Disease is managing antibiotic for  pneumonia/COVID. Renal consultation has been called for decrease in  urine output and worsening in kidney function. At the time of  consultation, the patient is seen per COVID protocol from outside. She  is vented and sedated. PAST MEDICAL HISTORY:  1. GERD. 2.  CA of the breast.  3.  Colon cancer. 4.  Hypertension. 5.  Diabetes mellitus type 2.  6.  Rheumatoid arthritis. 7.  Sarcoidosis of the lung. 8.  Seizure disorder. PAST SURGICAL HISTORY:  1.  Status post appendectomy. 2.  Status post breast surgery. 3.  Status post knee replacement. 4.  Status post right ulnar nerve surgery. MEDICATIONS:  Include  1. Tybost.  2.  Prezista. 3.  Pepcid. 4.  Humalog insulin. 5.  Merrem. 6.  Zoloft. REVIEW OF SYSTEMS:  Unobtainable. FAMILY HISTORY:  Unobtainable.     PHYSICAL EXAMINATION:  Done for COVID

## 2020-04-02 NOTE — PLAN OF CARE
alcohol-based hand  that contains at least 60% alcohol. Clean your hands often  Wash your hands often with soap and water for at least 20 seconds, especially after blowing your nose, coughing, or sneezing; going to the bathroom; and before eating or preparing food. If soap and water are not readily available, use an alcohol-based hand  with at least 60% alcohol, covering all surfaces of your hands and rubbing them together until they feel dry. Soap and water are the best option if hands are visibly dirty. Avoid touching your eyes, nose, and mouth with unwashed hands. Avoid sharing personal household items  You should not share dishes, drinking glasses, cups, eating utensils, towels, or bedding with other people or pets in your home. After using these items, they should be washed thoroughly with soap and water. Clean all high-touch surfaces everyday  High touch surfaces include counters, tabletops, doorknobs, bathroom fixtures, toilets, phones, keyboards, tablets, and bedside tables. Also, clean any surfaces that may have blood, stool, or body fluids on them. Use a household cleaning spray or wipe, according to the label instructions. Labels contain instructions for safe and effective use of the cleaning product including precautions you should take when applying the product, such as wearing gloves and making sure you have good ventilation during use of the product. Monitor your symptoms  Seek prompt medical attention if your illness is worsening (e.g., difficulty breathing). Before seeking care, call your healthcare provider and tell them that you have, or are being evaluated for, COVID-19. Put on a facemask before you enter the facility. These steps will help the healthcare providers office to keep other people in the office or waiting room from getting infected or exposed. Ask your healthcare provider to call the local or state health department.  Persons who are placed under active monitoring or facilitated self-monitoring should follow instructions provided by their local health department or occupational health professionals, as appropriate. When working with your local health department check their available hours. If you have a medical emergency and need to call 911, notify the dispatch personnel that you have, or are being evaluated for COVID-19. If possible, put on a facemask before emergency medical services arrive. Discontinuing home isolation  Patients with confirmed COVID-19 should remain under home isolation precautions until the risk of secondary transmission to others is thought to be low. The decision to discontinue home isolation precautions should be made on a case-by-case basis, in consultation with healthcare providers and [facility] and local health departments. Problem: PAIN  Goal: Patient's pain/discomfort is manageable  Outcome: Ongoing     Problem: SKIN INTEGRITY  Goal: Skin integrity is maintained or improved  Outcome: Ongoing  Monitoring patient skin integrity for skin breakdown, turning and repositioning q2h per protocol. Patient demonstrates turning and repositioning self. Problem: KNOWLEDGE DEFICIT  Goal: Patient/S.O. demonstrates understanding of disease process, treatment plan, medications, and discharge instructions.   Outcome: Ongoing     Problem: Airway Clearance - Ineffective:  Goal: Ability to maintain a clear airway will improve  Description: Ability to maintain a clear airway will improve  Outcome: Ongoing     Problem: Fluid Volume - Deficit:  Goal: Achieves intake and output within specified parameters  Description: Achieves intake and output within specified parameters  Outcome: Ongoing     Problem: Gas Exchange - Impaired:  Goal: Levels of oxygenation will improve  Description: Levels of oxygenation will improve  Outcome: Ongoing

## 2020-04-02 NOTE — PROGRESS NOTES
Hospitalist Progress Note    CC: Acute respiratory failure with hypoxia ([address]      Admit date: 3/27/2020  Days in hospital:  6    Subjective/interval history: Pt S/E. fio2 increased to 100%, peep 18. crt increased today. Pt proned and crrt started today per family's requrest    ROS:   Pertinent items are noted in HPI. Objective:    BP (!) 106/52   Pulse 113   Temp 99.1 °F (37.3 °C) (Axillary)   Resp 18   Ht 5' 10\" (1.778 m)   Wt 213 lb 6.5 oz (96.8 kg)   LMP 05/18/1975   SpO2 (!) 83%   BMI 30.62 kg/m²     Gen: intubated, sedated, prone position  HEENT: NC/AT  Neck: supple, trachea midline  Heart: Normal s1/s2, RRR, no murmurs, gallops, or rubs  Lungs: no accessory muscle use, no wheezing, + rales  Abd: bowel sounds present, soft, nontender, nondistended  Extrem: No clubbing, cyanosis, edema of the bl ue  Skin: no rashes or lesions  Psych: sedated, does not follow commands  Neuro:  No focal deficits, sedated  Cap refill: +2 sec    Medications:  Scheduled Meds:   [START ON 4/3/2020] famotidine (PEPCID) injection  20 mg Intravenous Daily    [START ON 4/3/2020] heparin (porcine)  5,000 Units Subcutaneous 3 times per day    norepinephrine        meropenem  500 mg Intravenous Q6H    lubrifresh P.M.    Both Eyes Q4H    azithromycin  250 mg Intravenous Q24H    cobicistat  150 mg Oral Daily    And    darunavir ethanolate  800 mg Oral Daily    docusate  100 mg Oral Daily    polyethylene glycol  17 g Oral Daily    senna  10 mL Oral Nightly    insulin lispro  0-12 Units Subcutaneous Q4H    mupirocin   Nasal BID    hydroxychloroquine  200 mg Oral BID    chlorhexidine  15 mL Mouth/Throat BID    lactobacillus  2 capsule Oral BID WC    aspirin  81 mg Oral Daily    [Held by provider] rosuvastatin  10 mg Oral Daily    sertraline  25 mg Oral Daily    sodium chloride flush  10 mL Intravenous 2 times per day       PRN Meds:  potassium chloride, magnesium sulfate, sodium phosphate IVPB **OR** sodium phosphate IVPB **OR** sodium phosphate IVPB **OR** sodium phosphate IVPB, calcium gluconate-NaCl **OR** calcium gluconate IVPB **OR** calcium gluconate IVPB **OR** calcium gluconate IVPB, albuterol sulfate HFA, fentanNYL, glucose, dextrose, glucagon (rDNA), dextrose, sodium chloride flush, acetaminophen **OR** acetaminophen, polyethylene glycol, promethazine **OR** ondansetron    IV:   cisatracurium (NIMBEX) infusion 2 mcg/kg/min (04/02/20 1318)    prismaSATE BGK 4/2.5      prismaSATE BGK 4/2.5      prismaSATE BGK 4/2.5      fentaNYL 200 mcg/hr (04/02/20 1445)    propofol 30 mcg/kg/min (04/02/20 1456)    dextrose           Intake/Output Summary (Last 24 hours) at 4/2/2020 1607  Last data filed at 4/2/2020 1220  Gross per 24 hour   Intake 1433.6 ml   Output 710 ml   Net 723.6 ml       Results:  CBC:   Recent Labs     03/31/20  0525 04/01/20  0530 04/02/20  0505   WBC 4.6 7.0 7.5   HGB 8.1* 8.8* 8.7*   HCT 24.7* 27.1* 26.2*   MCV 84.1 84.5 84.1    268 252     BMP:   Recent Labs     04/01/20  0530 04/02/20  0505 04/02/20  1310   * 134* 138   K 4.2 4.8 4.9    102 105   CO2 21 22 22   PHOS  --   --  4.0   BUN 13 20 24*   CREATININE 0.9 1.8* 2.3*     Mag: No results for input(s): MAG in the last 72 hours. Phos:   Lab Results   Component Value Date    PHOS 4.0 04/02/2020     No components found for: GLU    LIVER PROFILE:   Recent Labs     04/01/20  0530   AST 39*   ALT 15   BILIDIR 0.3   BILITOT 0.5   ALKPHOS 116     PT/INR: No results for input(s): PROTIME, INR in the last 72 hours. APTT: No results for input(s): APTT in the last 72 hours.   UA:  Recent Labs     03/31/20  1230   COLORU DK YELLOW   PHUR 6.0   WBCUA 19*   RBCUA 2   CLARITYU SL CLOUDY*   SPECGRAV >1.030   LEUKOCYTESUR Negative   UROBILINOGEN 1.0   BILIRUBINUR Negative   BLOODU Negative   GLUCOSEU 500*       Invalid input(s): ABG  Lab Results   Component Value Date    CALCIUM 8.3 04/02/2020    PHOS 4.0 04/02/2020 Assessment:    Principal Problem:    Acute respiratory failure with hypoxia (HCC)  Active Problems:    Sarcoidosis    Diabetes mellitus with hyperglycemia (HCC)    CAD (coronary artery disease)    HTN (hypertension)    HCAP (healthcare-associated pneumonia)    Sepsis ([address]    Hypotension    Shock ([address]    Moderate malnutrition ([address]  Resolved Problems:    * No resolved hospital problems. [facility] course:   63yo F with PMhx sig for GERD, sarcoidosis, RA, DMII, presents with several weeks of shortness of breath and had multiple visits to EDs then became increasingly worse and required admission.  Pt with sepsis POA based on leukopenia, tachypnea, acute resp failure with hypoxia, bilateral ground glass opacities, fevers.  Possible COVID19 exposure. Overnight pt sats dropped to 84% on 6L oxygen and pt oxygen increased to 15L high flow. Pt transferred to ICU and then urgently intubated. CXR looks like possible ARDS. She has increasing vent settings, prognosis is grim. Her daughter was called and will possibly withdrawal care tomorrow.     Plan:    Acute hypoxic respiratory failure, POA - worsening  ards   - with criteria: < 90% on RA, accessory muscle use, RR> 18, due to presumed covid 19 infection on baseline sarcoidosis  - supplemental oxygen as necessary to keep SaO2 > 90%, not on O2 at home  - intubated 3/29  - placed in prone position    Sepsis, POA - ongoing  - with criteria: lymphopenia, fevers, tachypnea; source is lungs  - mrsa probe, rapid flu neg   - 3/27 blood cultures - ngtd  - cont merrem due to increasing procalcitonin, vanc restarted     - sputum and urine cultures sent 3/31 - pending  - procalcitonin .5 -> ->2.85 today  - consulted ID     Possible COVID 19 infection   - higher probability based on clinical sx, leukopenia, infiltrates, fevers, acute resp failure with hypoxia, ards   - pcr sent 3/28  - completed plaquenil  0 on tybost, prezista, azithro     Diabetes  - sugars increased with the increase in tube feeds, improving today after the increase in insulin yesterday  - lantus stopped, med ssi  - accuchecks    Sarcoidosis   - poor prognostic factor  - supportive care    Code status:  full  DVT prophylaxis: [x] Lovenox  [] SQ Heparin  [] SCDs because of  [] warfarin/oral direct thrombin inhibitor [] Encourage ambulation      Disposition:  [] Home [] Rehab [] Psych [] SNF  [] LTAC  [] Transfer to ICU  [] Transfer to PCU [] Other: icu      Electronically signed by Mandy Simons DO on 4/2/2020 at 4:07 PM

## 2020-04-02 NOTE — CONSULTS
Palliative Care Note    I have spoken to the patients daughter Khris Hendrix [phone] concerning difficulty with CRRT low blood pressure and low pulse ox despite peep of 18 and FIO2 100% patients remaines prone. I have explained she is not tolerating CRRT and it has been stopped. I also explained vent is at max settings and her pulse ox remains low. We have discussed if she would like to come and see her mother and with draw care or we can continue current vent and pronning but she may  on vent. She wants to discuss with her  and will call back. [address] called back she is planning on coming up before  to say good bye to her mother, I explained only one person can come to room she expressed understanding.      Thank you for allowing me to participate in Ms Packer's care    Electronically signed by Joaquin Yen RN,PN on 2020 at [address] Nurse  Phone [phone]

## 2020-04-02 NOTE — PROGRESS NOTES
issues. Cr. Increasing despite IVF. Likely developing IRA. Will need to restart Vanc. Anemia  - stable. .   - no sign of bleeding.   - plt normal.       Prophylaxis  - GI - pepcid    - DVT -  lovenox    - VAP - peridex  - C. Diff - Lactobacillus  - Nasal Decolonization - Bactroban    Nutrition  - DIET TUBE FEED CONTINUOUS/CYCLIC NPO; Semi-elemental; Orogastric; Continuous; 25; 75    -     Intake/Output Summary (Last 24 hours) at 4/2/2020 0922  Last data filed at 4/2/2020 0540  Gross per 24 hour   Intake 1433.6 ml   Output 775 ml   Net 658.6 ml          Access  Arterial   Arterial Line 03/29/20 Right Femoral (Active)   $ Arterial line insertion $ Yes 3/29/2020  2:03 PM   Continued need for line? Yes 4/2/2020  4:00 AM   Site Assessment Clean;Dry; Intact 4/2/2020  4:00 AM   Line Status Arterial fluids per protocol; Intact and in place 4/2/2020  4:00 AM   Art Line Waveform Appropriate 4/2/2020  4:00 AM   Art Line Interventions Zeroed and calibrated; Leveled 3/31/2020  8:00 PM   Color/Movement/Sensation Capillary refill less than 3 sec 4/2/2020  4:00 AM   Dressing Status Clean;Dry; Intact 4/2/2020  4:00 AM   Dressing Type Transparent; Anti-microbial patch 4/2/2020  4:00 AM   Dressing Intervention New 3/29/2020  2:03 PM   Number of days: 3       PICC          CVC       CVC Triple Lumen 03/29/20 Right Femoral (Active)   $ Central line insertion $ Yes 3/29/2020  2:03 PM   Continued need for line? Yes 4/2/2020  4:00 AM   Site Assessment Clean;Dry; Intact 4/2/2020  4:00 AM   Proximal Lumen Status Infusing 4/2/2020  4:00 AM   Medial Lumen Status Infusing 4/2/2020  4:00 AM   Distal Lumen Status Infusing 4/2/2020  4:00 AM   Dressing Type Transparent; Anti-microbial patch 4/2/2020  4:00 AM   Dressing Status Clean;Dry; Intact 4/2/2020  4:00 AM   Dressing Intervention New 3/29/2020  2:03 PM   Number of days: 3          Called the daughter and discussed patient's progression and grim outlook.   Patient's daughter best

## 2020-04-02 NOTE — PROGRESS NOTES
[Held by provider] rosuvastatin  10 mg Oral Daily    sertraline  25 mg Oral Daily    sodium chloride flush  10 mL Intravenous 2 times per day       Continuous Infusions:   fentaNYL 100 mcg/hr (04/02/20 0629)    propofol 10 mcg/kg/min (04/01/20 2337)    dextrose         PRN Meds:  albuterol sulfate HFA, fentanNYL, glucose, dextrose, glucagon (rDNA), dextrose, sodium chloride flush, acetaminophen **OR** acetaminophen, polyethylene glycol, promethazine **OR** ondansetron      Assessment:     Patient Active Problem List   Diagnosis    Sarcoidosis    Diabetes mellitus with hyperglycemia (HCC)    Degenerative arthritis of knee    Seizure disorder (HCC)    Diverticulosis    Headaches, cluster    Chest pain    Trigger finger    CAD (coronary artery disease)    Diastolic dysfunction, left ventricle    TIFFANY (obstructive sleep apnea)    Restrictive lung disease    Arthritis of right hip    HTN (hypertension)    Anemia    H/O total hip arthroplasty, right-7.18.2017    Arthritis of left knee    Arthritis of right knee    Hyperlipidemia    Vertigo    Ulnar neuropathy of both upper extremities    Anesthesia of skin    Degeneration of lumbar or lumbosacral intervertebral disc    Displacement of lumbar intervertebral disc without myelopathy    Lumbar radiculopathy    HCAP (healthcare-associated pneumonia)    Sepsis ([address]    Acute respiratory failure with hypoxia ([address]    Hypotension    Shock ([address]     Sepsis and shock  ARDS  Resp failure  CT with Bi lateral ground glass opacities  Relative Lymphopenia  Progressive elevation in Procal  Fevers, and cough before presentation  Her Clinical presentation is very Consistent with COVID-19 PNEUMONIA and progressive radiological changes with clinical decline consistent with Pandemic COVID-19 Pneumonia     She remains very ill in ICU on the ventilator and given CXR ARDS is of concern - she has h/o breast cancer, DM and Sarcoidosis complicating recovery    CXR is worsening and she is critically ill in ICU on the vent, and now on CRRT and high Fio2 requirement is concerning and COVID-19 HIGHLY suspected and test is pending and will cont supportive care over all prognosis poor       Labs, Microbiology, Radiology and all the pertinent results from current hospitalization and  care every where were reviewed  by me as a part of the evaluation   Plan:   1. COVID-19 pcr in  process from 3/27  2. Finished x 5 days of Hydroxychlorquine  3. Added IV Azithromycin   4. Cont IV Meropenem to cover Secondary Bacterial infection until resp cx are final  5. CXR worsening    6. HIV screen -ve , Hepatitis screen  -ve  7. LDH and Ferritin noted   8. If this is COVID-19 pneumonia no specific therapy and given her condition and risk for Mortality is  high will try Darunavir+ Cobicistat as there is some benefit with antiviral therapy         Discussed with patient/Family and Nursing d/w ICU team  Risk of Complications/Morbidity: High      · Illness(es)/ Infection present that pose threat to bodily function. · There is potential for severe exacerbation of infection/side effects of treatment. · Therapy requires intensive monitoring for antimicrobial agent toxicity    Discussed with patient/Family and Nursing staff     Thanks for allowing me to participate in your patient's care and please call me with any questions or concerns.     Wild De Jesus MD  Infectious Disease  [facility] Physician  Phone: [phone]   Fax : [phone]

## 2020-04-02 NOTE — PROGRESS NOTES
Nutrition Assessment (Enteral Nutrition)    Type and Reason for Visit: Reassess, Initial    Nutrition Recommendations:   Restart nutrition when appropriate  Monitor propofol dose for need to adj TF  Monitor for start of CRRT for need to adj protein offered    Nutrition Assessment: Pt now meets criteria for moderate malnutrition AEB inadequate intake & significant wt loss. Pt remains in the ICU, intubated with continued need for paralytic. Propofol increased to 16.3 ml per hour, adding 430 kcals per day. TF started, Vital 1.2 AF with goal rate of 75 ml per hour. Noted TF on hold with NG to low wall suction r/t emesis with resultant increased O2 demands. Pt with worsening status including renal failure. Nephrology following & noted that pt may need CRRT. Pt with poor prognosis per MD & noted that family considering possible withdrawal of care. Malnutrition Assessment:  · Malnutrition Status: Meets the criteria for moderate malnutrition  · Context: Acute illness or injury  · Findings of the 6 clinical characteristics of malnutrition (Minimum of 2 out of 6 clinical characteristics is required to make the diagnosis of moderate or severe Protein Calorie Malnutrition based on AND/ASPEN Guidelines):  1. Energy Intake-Less than or equal to 75% of estimated energy requirement, Greater than or equal to 7 days    2. Weight Loss-10% loss or greater, (4 months or less than 6 months)  3. Fat Loss-Unable to assess,    4. Muscle Loss-Unable to assess,    5. Fluid Accumulation-Unable to assess,    6.   Strength-Not measured    Nutrition Risk Level: High    Nutrition Needs:  · Estimated Daily Total Kcal: 4862-7574 kcal (22-25 kcal/kg ABW)  · Estimated Daily Protein (g):  gm (1-1.2 gm/kg ABW)  · Estimated Daily Fluid (ml/day): 1 ml/kcal     Nutrition Diagnosis:   · Problem: Inadequate oral intake  · Etiology: related to Insufficient energy/nutrient consumption     Signs and symptoms:  as evidenced by Diet history

## 2020-04-03 NOTE — PROGRESS NOTES
0740: Bedside handoff received from [facility] nightshift RN. [facility] states physician aware of critical lab values: Potassium 6.4 (To gave 30 kayexelate as ordered), Sodium 129, and uncompensated respiratory acidosis ph 7.010, pCO2 77.4, pO2 103, HCO3 19.5, arterial O2 sat 97.1. CRRT not currently running d/t clotting of filter yesterday. ABG saO2 adequate, disregarding peripheral O2 sats d/t inaccuracy. 0800: Assessment completed, see documentation. Pt remains proned on ventilator FiO2 100%, AC 24 bpm, Vt 280, PEEP 18. Patient on 25 mcg/min levo titrated for a MAP >65, vaso 0.04 units/min. Sedation gtts: Fentanyl 200 mcg/hr, propofol 30 mcg/kg/min. Paralytic: Nimbex 1.8 mcg/kg/min. TOF 1/4, RASS -5.     0830: Nimbex decreased to 1.3 mcg/kg/min for TOF 1/4    1000: Nimbex remains at 1.3 mcg/kg/min for TOF 2/4    1030: 10 units regular insulin and 25mg iv dextrose given to lower potassium per Dr. Giuliana Patrick    1394: levo increased to 30 mcg/min abp 128/43 (58)    1045: levo remains at 30 mcg/min abp 140/46 (64)    1200: Patient deproned, assessment completed-see documentation. Capillary refills now greater than 3 seconds, fingertips and toes purple d/t pressors. Pt tolerated deproning, will restart CRRT as ordered. 1245: Orders received from Dr. Larissa Pires to have epi ready for CRRT    1315: Epi primed and ready, vascath has no blood return to either lumen. Orders received from Dr. Larissa Pires for altepase. 1343: Alteplase instilled to both lumens of vascath by Mando Rivera RN    2476: Epi started at 2 mcg/min /41 (53)    1437: Epi increased to 4 mcg/min /41 (53)    1441: Epi increased to 10 mcg/min per MD /43 (58)    1445: Mando Rivera RN restarted CRRT, filter clotted, CRRT stopped.     1508: Epi decreased to 4 mcg/min /48 (64)    1520: Radiology at bedside for CXR    1530: Epi increased to 5 mcg/min /48 (63)    1535: Epi increased to 6 mcg/min /48 (64)    1540: Epi remains at 6 mcg/min /48

## 2020-04-03 NOTE — PROGRESS NOTES
Oral Daily    sertraline  25 mg Oral Daily    sodium chloride flush  10 mL Intravenous 2 times per day     PRN Meds: potassium chloride, magnesium sulfate, sodium phosphate IVPB **OR** sodium phosphate IVPB **OR** sodium phosphate IVPB **OR** sodium phosphate IVPB, calcium gluconate-NaCl **OR** calcium gluconate IVPB **OR** calcium gluconate IVPB **OR** calcium gluconate IVPB, albuterol sulfate HFA, fentanNYL, glucose, dextrose, glucagon (rDNA), dextrose, sodium chloride flush, acetaminophen **OR** acetaminophen, polyethylene glycol, promethazine **OR** ondansetron      Intake/Output Summary (Last 24 hours) at 4/3/2020 1752  Last data filed at 4/3/2020 1625  Gross per 24 hour   Intake 3105.24 ml   Output 0 ml   Net 3105.24 ml       Physical Exam Performed:    BP (!) 127/46   Pulse 64   Temp 97.6 °F (36.4 °C) (Axillary)   Resp 24   Ht 5' 10\" (1.778 m)   Wt 220 lb 7.4 oz (100 kg)   LMP 05/18/1975   SpO2 91%   BMI 31.63 kg/m²       General appearance: intubated, sedated, paralized  HEENT: Conjunctivae/corneas clear, neck supple w/ full ROM  Respiratory:  Intubated, ++ rales  Cardiovascular: Regular rate and rhythm, normal S1/S2, no murmurs  Abdomen: Soft, non-tender, non-distended with normal bowel sounds. Musculoskeletal: + edema  Neurologic: sedated  Psychiatric: n/a  Capillary Refill: Brisk,< 3 seconds   Peripheral Pulses: +2 palpable, equal bilaterally       Labs:   Recent Labs     04/01/20  0530 04/02/20  0505 04/03/20  0420   WBC 7.0 7.5 9.8   HGB 8.8* 8.7* 9.4*   HCT 27.1* 26.2* 29.8*    252 250     Recent Labs     04/02/20  0505 04/02/20  1310 04/03/20  0420   * 138 129*   K 4.8 4.9 6.4*    105 97*   CO2 22 22 19*   BUN 20 24* 34*   CREATININE 1.8* 2.3* 3.2*   CALCIUM 8.2* 8.3 8.4   PHOS  --  4.0  --      Recent Labs     04/01/20  0530   AST 39*   ALT 15   BILIDIR 0.3   BILITOT 0.5   ALKPHOS 116     No results for input(s): INR in the last 72 hours.   No results for input(s):

## 2020-04-03 NOTE — PLAN OF CARE
improve  Description: Levels of oxygenation will improve  Outcome: Ongoing     Problem: Restraint Use - Nonviolent/Non-Self-Destructive Behavior:  Goal: Absence of restraint indications  Description: Absence of restraint indications  Outcome: Ongoing  Goal: Absence of restraint-related injury  Description: Absence of restraint-related injury  Outcome: Ongoing

## 2020-04-03 NOTE — PROGRESS NOTES
[email or web address]_18%20Telemed%20Guidance%20Updated%20March%2018. pdf?[email or web address]                      [email or web address]/3_18%20Telemed%20Guidance%20Updated%20March%2018. pdf?[email or web address]                      [email or web address] pdf                             General: intubated and sedated, on ventilator, vitals reviewed   HEENT: endotracheal tube in place    Cardiovascular: Telemetry data reviewed, rest deferred   Pulmonary: deferred  Abdomen/GI: deferred  Neuro: deferred  Skin: deferred  Musculoskeletal:  deferred  Genitourinary:  Scott's catheter in place  Psych: deferred  Lymphatic/Immunologic: deferred  Data Review:    Lab Results   Component Value Date    WBC 9.8 04/03/2020    HGB 9.4 (L) 04/03/2020    HCT 29.8 (L) 04/03/2020    MCV 88.4 04/03/2020     04/03/2020     Lab Results   Component Value Date    CREATININE 3.2 (H) 04/03/2020    BUN 34 (H) 04/03/2020     (L) 04/03/2020    K 6.4 (HH) 04/03/2020    CL 97 (L) 04/03/2020    CO2 19 (L) 04/03/2020       Hepatic Function Panel:   Lab Results   Component Value Date    ALKPHOS 116 04/01/2020    ALT 15 04/01/2020    AST 39 04/01/2020    PROT 5.9 04/01/2020    PROT 7.4 01/17/2013    BILITOT 0.5 04/01/2020    BILIDIR 0.3 04/01/2020    IBILI 0.2 04/01/2020    LABALBU 1.8 04/02/2020       UA:  Lab Results   Component Value Date    COLORU DK YELLOW 03/31/2020    CLARITYU SL CLOUDY 03/31/2020    GLUCOSEU 500 03/31/2020    GLUCOSEU 2+ 12/22/2011    BILIRUBINUR Negative 03/31/2020    BILIRUBINUR Negative 12/22/2011    KETUA TRACE 03/31/2020    SPECGRAV >1.030 03/31/2020    BLOODU Negative 03/31/2020    PHUR 6.0 03/31/2020    PROTEINU 100 03/31/2020    UROBILINOGEN 1.0 03/31/2020    NITRU Negative 03/31/2020    LEUKOCYTESUR Negative 03/31/2020    LABMICR YES 03/31/2020    URINETYPE NotGiven 03/31/2020      Urine

## 2020-04-03 NOTE — PROGRESS NOTES
Sarcoidosis    Diabetes mellitus with hyperglycemia (HCC)    CAD (coronary artery disease)    HTN (hypertension)    HCAP (healthcare-associated pneumonia)    Sepsis ([address]    Hypotension    Shock ([address]    Moderate malnutrition ([address]  Resolved Problems:    * No resolved hospital problems. *      PLAN:  1. IRA most likely ATN from sepsis . Hyper K txed medically . 2. Will resume CRRT when pt is un prone position and if she can tolerate . 3. Ac resp failure on vent . ( ARDS)   4. Covid Tx   5. Prognosis poor  .     Dank Locke MD.FACP

## 2020-04-03 NOTE — PROGRESS NOTES
Continuous Infusions:   EPINEPHrine infusion      cisatracurium (NIMBEX) infusion 1.3 mcg/kg/min (04/03/20 0829)    prismaSATE BGK 4/2.5 Stopped (04/02/20 1845)    prismaSATE BGK 4/2.5 Stopped (04/02/20 1845)    prismaSATE BGK 4/2.5 Stopped (04/02/20 1845)    norepinephrine 30 mcg/min (04/03/20 1035)    vasopressin (Septic Shock) infusion 0.04 Units/min (04/03/20 1147)    fentaNYL 200 mcg/hr (04/03/20 1318)    propofol 30 mcg/kg/min (04/03/20 0828)    dextrose         PRN Meds:  potassium chloride, magnesium sulfate, sodium phosphate IVPB **OR** sodium phosphate IVPB **OR** sodium phosphate IVPB **OR** sodium phosphate IVPB, calcium gluconate-NaCl **OR** calcium gluconate IVPB **OR** calcium gluconate IVPB **OR** calcium gluconate IVPB, albuterol sulfate HFA, fentanNYL, glucose, dextrose, glucagon (rDNA), dextrose, sodium chloride flush, acetaminophen **OR** acetaminophen, polyethylene glycol, promethazine **OR** ondansetron    Labs reviewed:  CBC:   Recent Labs     04/01/20  0530 04/02/20  0505 04/03/20  0420   WBC 7.0 7.5 9.8   HGB 8.8* 8.7* 9.4*   HCT 27.1* 26.2* 29.8*   MCV 84.5 84.1 88.4    252 250     BMP:   Recent Labs     04/02/20  0505 04/02/20  1310 04/03/20  0420   * 138 129*   K 4.8 4.9 6.4*    105 97*   CO2 22 22 19*   PHOS  --  4.0  --    BUN 20 24* 34*   CREATININE 1.8* 2.3* 3.2*     LIVER PROFILE:   Recent Labs     04/01/20  0530   AST 39*   ALT 15   BILIDIR 0.3   BILITOT 0.5   ALKPHOS 116     PT/INR: No results for input(s): PROTIME, INR in the last 72 hours. APTT: No results for input(s): APTT in the last 72 hours. UA:  No results for input(s): NITRITE, COLORU, PHUR, LABCAST, WBCUA, RBCUA, MUCUS, TRICHOMONAS, YEAST, BACTERIA, CLARITYU, SPECGRAV, LEUKOCYTESUR, UROBILINOGEN, BILIRUBINUR, BLOODU, GLUCOSEU, AMORPHOUS in the last 72 hours. Invalid input(s): [place]  No results for input(s): PH, PCO2, PO2 in the last 72 hours.     Cx:      Films:       Assessment:

## 2020-04-04 NOTE — PROGRESS NOTES
Rapid Influenza A Ag Negative    Rapid Influenza B Ag Negative   Narrative:     Performed at:  [facility]  [address] [facility] [address]   Phone [phone]   COVID-19, PCR [[id number]]    Order Status: Canceled Specimen: Nasopharyngeal Swab            IMAGING:    XR CHEST PORTABLE   Final Result   1. Interval placement of right internal jugular central venous catheter, in   proper position, with tip overlying the SVC. There is no evidence of a   pneumothorax. 2. Stable appropriate position of endotracheal tube. 3. Nasogastric tube in stable position, with tip lying just below the GE   junction, and side port within the lower esophagus. The tube needs to be   advanced by approximately 10 cm for more ideal positioning. 4. No significant change in appearance of diffuse widespread airspace opacity   throughout both lungs, likely ARDS. Findings were discussed with with the charge nurse on the patient's floor,   Abraham Chamorro, at 3:49 pm on 4/4/2020. XR CHEST PORTABLE   Final Result   Worsening airspace opacities         XR CHEST PORTABLE   Final Result   Multifocal airspace disease, decreased compared to prior         XR CHEST PORTABLE   Final Result   Significant interval worsening in appearance of parenchymal lung disease now   demonstrating diffuse bilateral airspace consolidation         XR CHEST PORTABLE   Final Result   1. Mild improved aeration of the right upper lobe         XR CHEST PORTABLE   Final Result   Significant progression in diffuse airspace opacities in the bilateral lungs. Pattern may represent ARDS. Pulmonary edema or atypical viral infection may   contribute to this pattern. VL Extremity Venous Bilateral   Final Result      CT CHEST WO CONTRAST   Final Result   There are new extensive bilateral patchy areas of ground-glass opacity   throughout both lungs.   Findings are nonspecific but can be seen in edema or atypical/viral pneumonia, to include COVID 19 in the correct clinical setting. Findings were discussed with Miko Domínguez at 3:10 pm on 3/27/2020. XR KNEE RIGHT (1-2 VIEWS)   Final Result   No acute abnormality. Improving soft tissue swelling               All the pertinent images and reports for the current Hospitalization were reviewed by me     Scheduled Meds:   sodium chloride  20 mL Intravenous Once    anidulafungin  100 mg Intravenous Q24H    insulin glargine  30 Units Subcutaneous Nightly    famotidine (PEPCID) injection  20 mg Intravenous Daily    heparin (porcine)  5,000 Units Subcutaneous 3 times per day    meropenem  500 mg Intravenous Q6H    lubrifresh P.M.    Both Eyes Q4H    azithromycin  250 mg Intravenous Q24H    cobicistat  150 mg Oral Daily    And    darunavir ethanolate  800 mg Oral Daily    docusate  100 mg Oral Daily    polyethylene glycol  17 g Oral Daily    senna  10 mL Oral Nightly    insulin lispro  0-12 Units Subcutaneous Q4H    chlorhexidine  15 mL Mouth/Throat BID    lactobacillus  2 capsule Oral BID WC    aspirin  81 mg Oral Daily    [Held by provider] rosuvastatin  10 mg Oral Daily    sertraline  25 mg Oral Daily    sodium chloride flush  10 mL Intravenous 2 times per day       Continuous Infusions:   heparin 5000 units in 0.9% sodium chloride 1000 mL 25 mL/hr (04/05/20 0519)    propofol Stopped (04/05/20 0543)    EPINEPHrine infusion 26 mcg/min (04/05/20 0830)    prismaSATE BGK 4/2.5 1,000 mL/hr (04/05/20 0650)    prismaSATE BGK 4/2.5 1,500 mL/hr (04/05/20 0930)    prismaSATE BGK 4/2.5 500 mL/hr (04/05/20 0650)    norepinephrine 40 mcg/min (04/05/20 0306)    vasopressin (Septic Shock) infusion 0.04 Units/min (04/05/20 0626)    fentaNYL 50 mcg/hr (04/05/20 0813)    dextrose         PRN Meds:  dextrose, potassium chloride, magnesium sulfate, sodium phosphate IVPB **OR** sodium phosphate IVPB **OR** sodium phosphate IVPB **OR** sodium phosphate IVPB, calcium gluconate-NaCl **OR** calcium gluconate IVPB **OR** calcium gluconate IVPB **OR** calcium gluconate IVPB, albuterol sulfate HFA, fentanNYL, glucose, dextrose, glucagon (rDNA), dextrose, sodium chloride flush, acetaminophen **OR** acetaminophen, polyethylene glycol, promethazine **OR** ondansetron      Assessment:     Patient Active Problem List   Diagnosis    Sarcoidosis    Diabetes mellitus with hyperglycemia (HCC)    Degenerative arthritis of knee    Seizure disorder (HCC)    Diverticulosis    Headaches, cluster    Chest pain    Trigger finger    CAD (coronary artery disease)    Diastolic dysfunction, left ventricle    TIFFANY (obstructive sleep apnea)    Restrictive lung disease    Arthritis of right hip    HTN (hypertension)    Anemia    H/O total hip arthroplasty, right-7.18.2017    Arthritis of left knee    Arthritis of right knee    Hyperlipidemia    Vertigo    Ulnar neuropathy of both upper extremities    Anesthesia of skin    Degeneration of lumbar or lumbosacral intervertebral disc    Displacement of lumbar intervertebral disc without myelopathy    Lumbar radiculopathy    HCAP (healthcare-associated pneumonia)    Sepsis ([address]    Acute respiratory failure with hypoxia (HCC)    Hypotension    Shock ([address]    Moderate malnutrition (HCC)     Sepsis and shock  ARDS  Resp failure  CT with Bi lateral ground glass opacities  Relative Lymphopenia  Progressive elevation in Procal  Fevers, and cough before presentation  Her Clinical presentation is very Consistent with COVID-19 PNEUMONIA and progressive radiological changes with clinical decline consistent with Pandemic COVID-19 Pneumonia     She remains very ill in ICU on the ventilator and given CXR ARDS is of concern - she has h/o breast cancer, DM and Sarcoidosis complicating recovery    CXR is worsening and she is critically ill in ICU on the vent, and now on CRRT and high Fio2 requirement is concerning and and please call me with any questions or concerns.     Chichi Cross MD  Infectious Disease  [facility] Physician  Phone: [phone]   Fax : [phone]

## 2020-04-04 NOTE — PROGRESS NOTES
Hospitalist Progress Note      PCP: Maciel Corrales MD    Date of Admission: 3/27/2020      Hospital Course: Mikie Rebolledo is 65yo F with PMhx sig for GERD, sarcoidosis, RA, DMII, presents with several weeks of shortness of breath and had multiple visits to EDs then became increasingly worse and required admission.  Pt with sepsis POA based on leukopenia, tachypnea, acute resp failure with hypoxia, bilateral ground glass opacities, fevers.  Possible COVID19 exposure.  Pt transferred to ICU and then urgently intubated. [place] looks like possible ARDS. She has increasing vent settings, prognosis is grim. CRRT started. Subjective:    Patient seen and examined. Paralyzed. On CRRT, intubated and proning. Patient is limited code. Medications:  Reviewed    Infusion Medications    sodium bicarbonate infusion 150 mL/hr at 04/04/20 1011    EPINEPHrine infusion 19 mcg/min (04/04/20 1347)    prismaSATE BGK 4/2.5 Stopped (04/04/20 0130)    prismaSATE BGK 4/2.5 Stopped (04/04/20 0130)    prismaSATE BGK 4/2.5 Stopped (04/04/20 0130)    norepinephrine 30 mcg/min (04/04/20 0522)    vasopressin (Septic Shock) infusion 0.04 Units/min (04/04/20 1348)    fentaNYL 200 mcg/hr (04/04/20 1434)    propofol 30 mcg/kg/min (04/04/20 0057)    dextrose       Scheduled Medications    [START ON 4/5/2020] anidulafungin  100 mg Intravenous Q24H    IVPB builder  400 mg Intravenous Once    insulin glargine  30 Units Subcutaneous Nightly    famotidine (PEPCID) injection  20 mg Intravenous Daily    heparin (porcine)  5,000 Units Subcutaneous 3 times per day    meropenem  500 mg Intravenous Q6H    lubrifresh P.M.    Both Eyes Q4H    azithromycin  250 mg Intravenous Q24H    cobicistat  150 mg Oral Daily    And    darunavir ethanolate  800 mg Oral Daily    docusate  100 mg Oral Daily    polyethylene glycol  17 g Oral Daily    senna  10 mL Oral Nightly    insulin lispro  0-12 Units Subcutaneous Q4H    chlorhexidine 15 mL Mouth/Throat BID    lactobacillus  2 capsule Oral BID     aspirin  81 mg Oral Daily    [Held by provider] rosuvastatin  10 mg Oral Daily    sertraline  25 mg Oral Daily    sodium chloride flush  10 mL Intravenous 2 times per day     PRN Meds: dextrose, potassium chloride, magnesium sulfate, sodium phosphate IVPB **OR** sodium phosphate IVPB **OR** sodium phosphate IVPB **OR** sodium phosphate IVPB, calcium gluconate-NaCl **OR** calcium gluconate IVPB **OR** calcium gluconate IVPB **OR** calcium gluconate IVPB, albuterol sulfate HFA, fentanNYL, glucose, dextrose, glucagon (rDNA), dextrose, sodium chloride flush, acetaminophen **OR** acetaminophen, polyethylene glycol, promethazine **OR** ondansetron      Intake/Output Summary (Last 24 hours) at 4/4/2020 1553  Last data filed at 4/4/2020 1444  Gross per 24 hour   Intake 5535.65 ml   Output 800 ml   Net 4735.65 ml       Physical Exam Performed:    BP (!) 113/44   Pulse 110   Temp 97.5 °F (36.4 °C) (Axillary)   Resp 30   Ht 5' 10\" (1.778 m)   Wt 224 lb 3.3 oz (101.7 kg)   LMP 05/18/1975   SpO2 (!) 89%   BMI 32.17 kg/m²       General appearance: intubated, sedated, paralized  HEENT: Conjunctivae/corneas clear, neck supple w/ full ROM  Respiratory:  Intubated, ++ rales  Cardiovascular: Regular rate and rhythm, normal S1/S2, no murmurs  Abdomen: Soft, non-tender, non-distended with normal bowel sounds.   Musculoskeletal: + edema  Neurologic: sedated  Psychiatric: n/a  Capillary Refill: Brisk,< 3 seconds   Peripheral Pulses: +2 palpable, equal bilaterally       Labs:   Recent Labs     04/02/20  0505 04/03/20  0420 04/04/20  0600   WBC 7.5 9.8 12.1*   HGB 8.7* 9.4* 7.8*   HCT 26.2* 29.8* 24.8*    250 200     Recent Labs     04/02/20  1310 04/03/20  0420 04/03/20  1800 04/04/20  0600    129* 129* 126*   K 4.9 6.4* 5.9* 4.8  4.8    97* 97* 92*   CO2 22 19* 17* 20*   BUN 24* 34* 37* 35*   CREATININE 2.3* 3.2* 3.8* 3.8*   CALCIUM 8.3 8.4 Final Result      CT CHEST WO CONTRAST   Final Result   There are new extensive bilateral patchy areas of ground-glass opacity   throughout both lungs. Findings are nonspecific but can be seen in edema or   atypical/viral pneumonia, to include COVID 19 in the correct clinical setting. Findings were discussed with Lv Cartwright at 3:10 pm on 3/27/2020. XR KNEE RIGHT (1-2 VIEWS)   Final Result   No acute abnormality.   Improving soft tissue swelling                 Assessment/Plan:    Active Hospital Problems    Diagnosis    Sarcoidosis [D86.9]     Priority: High    Diabetes mellitus with hyperglycemia ([address] [E11.65]     Priority: High    Moderate malnutrition (HCC) [E44.0]    Hypotension [I95.9]    Shock ([address] [R57.9]    Sepsis ([address] [A41.9]    Acute respiratory failure with hypoxia (HCC) [J96.01]    HCAP (healthcare-associated pneumonia) [J18.9]    HTN (hypertension) [I10]    CAD (coronary artery disease) [I25.10]     Acute respiratory failure with hypoxia, ARDS  - mgmt per pulmonology/CC  - poor prognosis - palliative care following    Bilateral PNA  - c/w Merrem per ID    - urine antigens negative  - respi cx no growth  - concern for COVID-19    Concern for COVID-19  - test from 3/28 pending  - completed plaquenil course  - on Darunavir, Cobicistat  - on IV azithromycin    Septic shock  - on levo, vaso, epi   - MRSA negative, flu negative  - blood cx - no growth  - procalcitonin increasing  - c/w merrem, Eraxis added today  - Mgmt per Critical care and ID    IRA  - CRRT - difficulties o/n with line clotting  - mgmt per nephrology     Hyperkalemia, hyponatremia - CRRT    Metabolic acidosis  - CRRT    DMT2, hyperglycemia  - lantus added, c/w ssi     DVT Prophylaxis: heparin   Diet: DIET TUBE FEED CONTINUOUS/CYCLIC NPO; Semi-elemental; Orogastric; Continuous; 25; 75  Code Status: Limited      Dispo - remains inpatient in critical care, poor prognosis     Luz Benavidez MD

## 2020-04-04 NOTE — PROGRESS NOTES
Medicine Addendum    CRRT lines no longer working. Will stop per nephrology. Could potentially retry if pt is no longer prone. Nephrology treating hyperkalemia with IV insulin D50 and albuterol MDI. Prognosis extremely poor. Should consider withdrawal of care. Electronically signed by Sae Locke MD on 4/4/2020 at 1:47 AM     Pt went into afib with RVR with rate of 110-115. Pt may be dependent on HR. Will start amiodarone if HR >130s. Having to increase epi drip overnight to maintain blood pressure. Prognosis extremely poor.     Electronically signed by Sae Locke MD on 4/4/2020 at 5:48 AM

## 2020-04-04 NOTE — PROGRESS NOTES
EPINEPHrine infusion 18 mcg/min (04/04/20 0620)    heparin 5000 units CRRT syringe Stopped (04/04/20 0258)    heparin 5000 units in 0.9% sodium chloride 1000 mL Stopped (04/04/20 0240)    cisatracurium (NIMBEX) infusion 0.4 mcg/kg/min (04/04/20 0906)    prismaSATE BGK 4/2.5 Stopped (04/04/20 0130)    prismaSATE BGK 4/2.5 Stopped (04/04/20 0130)    prismaSATE BGK 4/2.5 Stopped (04/04/20 0130)    norepinephrine 30 mcg/min (04/04/20 0522)    vasopressin (Septic Shock) infusion 0.04 Units/min (04/04/20 0522)    fentaNYL 200 mcg/hr (04/04/20 0927)    propofol 30 mcg/kg/min (04/04/20 0057)    dextrose         PRN Meds:  dextrose, potassium chloride, magnesium sulfate, sodium phosphate IVPB **OR** sodium phosphate IVPB **OR** sodium phosphate IVPB **OR** sodium phosphate IVPB, calcium gluconate-NaCl **OR** calcium gluconate IVPB **OR** calcium gluconate IVPB **OR** calcium gluconate IVPB, albuterol sulfate HFA, fentanNYL, glucose, dextrose, glucagon (rDNA), dextrose, sodium chloride flush, acetaminophen **OR** acetaminophen, polyethylene glycol, promethazine **OR** ondansetron    Labs reviewed:  CBC:   Recent Labs     04/02/20  0505 04/03/20  0420 04/04/20  0600   WBC 7.5 9.8 12.1*   HGB 8.7* 9.4* 7.8*   HCT 26.2* 29.8* 24.8*   MCV 84.1 88.4 86.7    250 200     BMP:   Recent Labs     04/02/20  1310 04/03/20  0420 04/03/20  1800 04/04/20  0600    129* 129* 126*   K 4.9 6.4* 5.9* 4.8  4.8    97* 97* 92*   CO2 22 19* 17* 20*   PHOS 4.0  --  8.6* 6.9*   BUN 24* 34* 37* 35*   CREATININE 2.3* 3.2* 3.8* 3.8*     LIVER PROFILE:   No results for input(s): AST, ALT, LIPASE, BILIDIR, BILITOT, ALKPHOS in the last 72 hours. Invalid input(s): AMYLASE,  ALB  PT/INR: No results for input(s): PROTIME, INR in the last 72 hours. APTT: No results for input(s): APTT in the last 72 hours.   UA:  No results for input(s): NITRITE, COLORU, PHUR, LABCAST, WBCUA, RBCUA, MUCUS, TRICHOMONAS, YEAST, BACTERIA,

## 2020-04-05 NOTE — PROGRESS NOTES
represent ARDS. Pulmonary edema or atypical viral infection may   contribute to this pattern. VL Extremity Venous Bilateral   Final Result      CT CHEST WO CONTRAST   Final Result   There are new extensive bilateral patchy areas of ground-glass opacity   throughout both lungs. Findings are nonspecific but can be seen in edema or   atypical/viral pneumonia, to include COVID 19 in the correct clinical setting. Findings were discussed with Debbie Myers at 3:10 pm on 3/27/2020. XR KNEE RIGHT (1-2 VIEWS)   Final Result   No acute abnormality.   Improving soft tissue swelling                 Assessment/Plan:    Active Hospital Problems    Diagnosis    Sarcoidosis [D86.9]     Priority: High    Diabetes mellitus with hyperglycemia ([facility] [E11.65]     Priority: High    Moderate malnutrition (HCC) [E44.0]    Hypotension [I95.9]    Shock ([address] [R57.9]    Sepsis ([facility] [A41.9]    Acute respiratory failure with hypoxia (HCC) [J96.01]    HCAP (healthcare-associated pneumonia) [J18.9]    HTN (hypertension) [I10]    CAD (coronary artery disease) [I25.10]     Acute respiratory failure with hypoxia, ARDS  - mgmt per pulmonology/CC  - very poor prognosis - palliative care following  PaO2 improved after proning  Consider stop running today  Due to increased procalcitonin antifungal added to meropenem    Bilateral PNA  - c/w Merrem per ID    Antifungal added  - urine antigens negative  - respi cx no growth  - concern for [id number]    Metabolic acidosis secondary to acute renal failure monitor with hemodialysis      Acute blood loss anemia with hemoglobin 6.1 plan for 1 unit blood transfusion  Monitor CBC    Concern for COVID-19  - test from 3/28 pending  - completed plaquenil course  - on Darunavir, Cobicistat  - on IV azithromycin  On IV tocilizumab    Septic shock  - on levo, vaso, epi   - MRSA negative, flu negative  - blood cx - no growth  - procalcitonin increasing, add antifungal   - c/w merrem,   - Mgmt per Critical care and ID    IRA  - CRRT - difficulties o/n with line clotting which resolved today  - mgmt per nephrology     Hyperkalemia, hyponatremia - CRRT      DMT2, hyperglycemia  - lantus added, c/w ssi     DVT Prophylaxis: heparin   GI PPX on pepcid    Diet: DIET TUBE FEED CONTINUOUS/CYCLIC NPO; Semi-elemental; Orogastric; Continuous; 25; 75  Code Status: Limited      Dispo - remains inpatient in critical care, poor prognosis     Philly Mills MD

## 2020-04-05 NOTE — PROGRESS NOTES
Pulmonary Progress Note    Date of Admission: 3/27/2020   LOS: 9 days       CC:  Acute hypoxemia    Subjective: Worsening temp overnight. On Robyn hugger. CRRT. Having clotting of CRRT and losing blood. Second HD line place. ROS:   Unable to assess        PHYSICAL EXAM:   Blood pressure (!) 119/45, pulse 124, temperature 96.5 °F (35.8 °C), temperature source Axillary, resp. rate 30, height 5' 10\" (1.778 m), weight 222 lb 10.6 oz (101 kg), last menstrual period 05/18/1975, SpO2 95 %, not currently breastfeeding.'  Gen: No distress. sedated       Eyes:    ENT:   ett  Neck: Trachea midline. No obvious mass. Resp: No accessory muscle use. +++ crackles. No wheezes. No rhonchi. On vent. CV: posterior exam  GI: Non-tender. Non-distended. No hernia. Skin: Warm, dry, normal texture and turgor. No nodule on exposed extremities. Lymph: No cervical LAD. No supraclavicular LAD. M/S: No cyanosis. No clubbing. No joint deformity. Neuro: sedated  Psych: sedated. Medications:    Scheduled Meds:   anidulafungin  100 mg Intravenous Q24H    insulin glargine  30 Units Subcutaneous Nightly    famotidine (PEPCID) injection  20 mg Intravenous Daily    heparin (porcine)  5,000 Units Subcutaneous 3 times per day    meropenem  500 mg Intravenous Q6H    lubrifresh P.M.    Both Eyes Q4H    azithromycin  250 mg Intravenous Q24H    cobicistat  150 mg Oral Daily    And    darunavir ethanolate  800 mg Oral Daily    docusate  100 mg Oral Daily    polyethylene glycol  17 g Oral Daily    senna  10 mL Oral Nightly    insulin lispro  0-12 Units Subcutaneous Q4H    chlorhexidine  15 mL Mouth/Throat BID    lactobacillus  2 capsule Oral BID WC    aspirin  81 mg Oral Daily    [Held by provider] rosuvastatin  10 mg Oral Daily    sertraline  25 mg Oral Daily    sodium chloride flush  10 mL Intravenous 2 times per day       Continuous Infusions:   heparin 5000 units in 0.9% sodium chloride 1000  Acute hypoxemia < 90% on room air    Acute resp failure   Sepsis   DM T2    Plan:      Daughter # Lopez: [phone]      Resp /ARDS    - AC VC 30,250 itime 1.5, 18. Change RR 33, 250 (secondary to peak pressure), itime 1, 18 to attempt to improve pco2  - sedated. - very poor prognosis   -chest X-ray mildly better. Pao2 improved  - will assess Pao2 after on back. Consider stopping proning if remains elevated. - Procalcitonin  Increasing. added antifungal to merrem. - started on tocilizumab. IRA  - crrt. - 16L positive. In shock. Very poor prognosis. CV  - continues in A fib  - multiple vasopressors.   - Wean vasopressors to MAP 65     Metabolic acidosis   -  montior with HD. Anemia  - stable. .   -lost blood with CRRT. Transfuse 1 unit. Prophylaxis  - GI - pepcid    - DVT -  lovenox    - VAP - peridex  - C. Diff - Lactobacillus  - Nasal Decolonization - Bactroban    Nutrition  - DIET TUBE FEED CONTINUOUS/CYCLIC NPO; Semi-elemental; Orogastric; Continuous; 25; 75    -     Intake/Output Summary (Last 24 hours) at 4/5/2020 0849  Last data filed at 4/5/2020 0800  Gross per 24 hour   Intake 5006.3 ml   Output 1637 ml   Net 3369.3 ml          Access  Arterial   Arterial Line 03/29/20 Right Femoral (Active)   $ Arterial line insertion $ Yes 3/29/2020  2:03 PM   Continued need for line? Yes 4/5/2020  6:00 AM   Site Assessment Dry; Intact 4/5/2020  6:00 AM   Line Status Arterial fluids per protocol; Intact and in place 4/5/2020  6:00 AM   Art Line Waveform Appropriate 4/5/2020  6:00 AM   Art Line Interventions Zeroed and calibrated; Leveled; Connections checked and tightened;Flushed per protocol 4/5/2020  4:00 AM   Color/Movement/Sensation Capillary refill greater than 3 sec 4/5/2020  6:00 AM   Dressing Status Old drainage 4/5/2020  6:00 AM   Dressing Type Transparent; Anti-microbial patch 4/2/2020  6:00 PM   Dressing Intervention New 3/29/2020  2:03 PM   Number of days: 6 PICC          CVC       CVC Triple Lumen 03/29/20 Right Femoral (Active)   $ Central line insertion $ Yes 3/29/2020  2:03 PM   Continued need for line? Yes 4/5/2020  6:00 AM   Site Assessment Intact 4/5/2020  6:00 AM   Proximal Lumen Status Infusing;Capped 4/5/2020  6:00 AM   Medial Lumen Status Infusing;Capped 4/5/2020  6:00 AM   Distal Lumen Status Infusing;Capped 4/5/2020  6:00 AM   Dressing Type Transparent; Anti-microbial patch 4/2/2020  6:00 PM   Dressing Status Old drainage 4/4/2020  6:00 AM   Dressing Intervention New 3/29/2020  2:03 PM   Dressing Change Due 04/08/20 4/5/2020  6:00 AM   Number of days: 6              I spent 35 minutes of critical care time with this patient excluding any procedures. This note was transcribed using [id number] [employer]. Please disregard any translational errors.       Pranav Bains [facility], [facility] [phone]

## 2020-04-05 NOTE — PROGRESS NOTES
ASSESSMENT/PLAN:  1. IRA   - ATN   - CRRT for solute and volume control   - net loss zero    2. ventilated   - per pulmonary      3. ID   - Covid pending    - on Darunavir, Cobicistat    - deann increased     - developing cytokine storm     - on tocilizumab     - will consider plamapheresis   - on eraxis, meropenem    4. anemia  - PRBC    5.   CV   - on levophed, epinephrine, and vasopressin gtt    critical care:  30+ min
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
## 2020-04-06 NOTE — CARE COORDINATION
Wound consult noted for proning. Due to Covid-19 isolation and goal to limit traffic in room, pt not seen. Spoke with RN, chart reviewed. Pt had been proned and now has DTI to nose and mucosal membrane to lip. Pt has been on CRRT and multiple pressors. REC: venelex to nose. Lip balm or moisturizer to lip.  Eulogio Cameron, JUANA, RN, Jamie Eloina

## 2020-04-06 NOTE — CARE COORDINATION
Remains intubated and on CRRT. Palliative care was consulted, but family does not want to stop treatment. Will follow for D/C needs.      Electronically signed by Lauren Nieves RN on 4/6/2020 at 10:32 AM

## 2020-04-06 NOTE — PROGRESS NOTES
1925: Report received from [facility]. Epinephrine, Levophed and vasopressin infusing via CVC. MAP 48 per a-line. Message sent to critical care team to notify. In AF per monitor 90's to 100's. Unresponsive on vent. TF infusing as ordered. Limbs flaccid  1935: New orders received from Arnold Zavala NP. Levo increase to 50 mcg  2000: Shift assessment complete--see flow sheet  2018: Epi gtt increased to 30 per order in attempt to achieve MAP 60. BG 67, insulin held. Will recheck prior to scheduled lantus administration. TF infusing via NG; tolerating well  2110: BG 67; lantus held and prn dextrose given  2125: Repeat .   2200:  Stephanie care complete for small leaking around flexi seal, moisture barrier applied; no breakdown noted. 2336: Converted to SR  0010:  Blood drawn via Vas cath and A-line and sentto lab for ordered tests  0030: Critical results received from lab. Venous pH 7.006/Arterial pH 7.029. Will message critical care team. Lab results reviewed and Ca Gluconate ordered  0045: New order received from [address] [place] [address] CNP  0125: Sodium Bicarb administered as ordered with immediate improvement of MAP from 58 to 68  0200:  Ca. Gluconate infusing as ordered. MAP back in 50's  0240:  Filter clotting, blood returned  0320:  CRRT resumed   0400: Bath complete. Assessment unchanged  0450: MAP 40's. Neosynephrine ordered  0511: Devaughn synephrine gtt started as ordered. 0515: Filter clotting alarm. Unable to return blood d/t clot pulled from return line. 0724: CRRT resumed  0600: Devaughn increased for MAP 48  0605: Blood drawn via Vas Cath and A-Line and sent to lab for scheduled bloodwork  0620: Critical pH 6.964 received from lab; message sent to critical care with ABG results. NNO at this siva  0636: Critical H/H ( 5.2/17. 3) Critical care team notified  6741: Ca gluc replacement infusing  0645: Fluid removal stopped for low BP  0700: Critical Lactic acid received; reported to Dr. Tasha Gonsales; overnight events discussed.  NNO at this time  0720: Report given to Luigi Rodríguez.  Handoff complete  Electronically signed by Michelle Garcia RN on 4/7/2020 at 7:22 AM

## 2020-04-06 NOTE — PROGRESS NOTES
ATN   - CRRT for solute and volume control   - net loss zero    2. ventilated   - per pulmonary      3. ID   - Covid positive    - on Darunavir, Cobicistat    - deann increased     - developing cytokine storm     - on tocilizumab     - if ferritin higher, then will pursue steroids as well as plasmapheresis   - on eraxis, meropenem    4. anemia  - PRBC    5.   CV   - on levophed and vasopressin gtt    critical care:  30+ min

## 2020-04-06 NOTE — PROGRESS NOTES
Hospitalist Progress Note      PCP: Nanette Moreno MD    Date of Admission: 3/27/2020      Hospital Course: Teresa Kennedy is 65yo F with PMhx sig for GERD, sarcoidosis, RA, DMII, presents with several weeks of shortness of breath and had multiple visits to EDs then became increasingly worse and required admission.  Pt with sepsis POA based on leukopenia, tachypnea, acute resp failure with hypoxia, bilateral ground glass opacities, fevers.  Possible COVID19 exposure.  Pt transferred to ICU and then urgently intubated. Joselito Apgar looks like possible ARDS. She has increasing vent settings, prognosis is grim. CRRT started. Subjective:    Patient seen and examined. RESTARTED  On CRRT, intubated ,. Hemoglobin 6.6 today , had 1 unit PRBC yesterday  Critical does not feel need further blood transfusion  Very high ferritin, may need plasmapheresis? On CRRT   Slightly less FiO2  Of epinephrine  No clotting issue with the CRRT      Medications:  Reviewed    Infusion Medications    heparin 5000 units in 0.9% sodium chloride 1000 mL 25 mL/hr (20 0102)    propofol Stopped (20 0543)    EPINEPHrine infusion Stopped (20 0352)    prismaSATE BGK 4/2.5 2,000 mL/hr (20 1211)    prismaSATE BGK 4/2.5 1,000 mL/hr (20 0917)    prismaSATE BGK 4/2.5 1,000 mL/hr (20 0959)    norepinephrine 35 mcg/min (20 0603)    vasopressin (Septic Shock) infusion 0.04 Units/min (20 0547)    fentaNYL Stopped (20 181)    dextrose       Scheduled Medications    Venelex   Topical BID    sodium chloride  20 mL Intravenous Once    anidulafungin  100 mg Intravenous Q24H    insulin glargine  30 Units Subcutaneous Nightly    famotidine (PEPCID) injection  20 mg Intravenous Daily    [Held by provider] heparin (porcine)  5,000 Units Subcutaneous 3 times per day    meropenem  500 mg Intravenous Q6H    lubrifresh P.M.    Both Eyes Q4H    cobicistat  150 mg Oral Daily    And    progression in diffuse airspace opacities in the bilateral lungs. Pattern may represent ARDS. Pulmonary edema or atypical viral infection may   contribute to this pattern. VL Extremity Venous Bilateral   Final Result      CT CHEST WO CONTRAST   Final Result   There are new extensive bilateral patchy areas of ground-glass opacity   throughout both lungs. Findings are nonspecific but can be seen in edema or   atypical/viral pneumonia, to include COVID 19 in the correct clinical setting. Findings were discussed with Fidelia Monroy at 3:10 pm on 3/27/2020. XR KNEE RIGHT (1-2 VIEWS)   Final Result   No acute abnormality.   Improving soft tissue swelling                 Assessment/Plan:    Active Hospital Problems    Diagnosis    Sarcoidosis [D86.9]     Priority: High    Diabetes mellitus with hyperglycemia ([facility] [address] [E11.65]     Priority: High    Shock ([address] [R57.9]    Acute respiratory distress syndrome (ARDS) due to COVID-19 virus [U07.1, J80]    Abnormal CXR [R93.89]    IRA (acute kidney injury) ([facility] [address] [N17.9]    Dyspnea [R06.00]    Hypoxia [R09.02]    Suspected severe acute respiratory syndrome coronavirus 2 (SARS-CoV-2) infection [R68.89]    Moderate malnutrition (HCC) [E44.0]    Hypotension [I95.9]    Septic shock (HCC) [A41.9, R65.21]    Sepsis ([address] [A41.9]    Acute respiratory failure with hypoxia (HCC) [J96.01]    Pneumonia of both lungs due to infectious organism [J18.9]    HTN (hypertension) [I10]    CAD (coronary artery disease) [I25.10]     Acute respiratory failure with hypoxia, ARDS  - mgmt per pulmonology/CC  - very poor prognosis - palliative care following  PaO2 improved after proning, slightly less FIO2 today  Due to increased procalcitonin antifungal added to meropenem    COVID 19 pneumonia  - c/w Merrem per ID    Antifungal added  - urine antigens negative  - respi cx no growth  -  COVID-19 positive  - high ferritin and LDH    Metabolic acidosis secondary to acute

## 2020-04-06 NOTE — PROGRESS NOTES
Nutrition Assessment (Enteral Nutrition)    Type and Reason for Visit: Reassess    Nutrition Recommendations:   Continue Current TF regimen (Vital 1.2 AF goal rate of 75 ml per hour)  Flush per MD  Monitor continued need for CRRT & possible addition of 1 bottle of Proteinex per feeding tube per day    Nutrition Assessment: Pt remains in the ICU, intubated, on pressor support. Pt no longer paralyzed, although did require proning at one point. Propofol is now off. CRRT started with improvement in renal labs. TF restarted (Vital 1.2 formula) via NG per MD. Goal rate of 75 ml per hour, remains appropriate at this time. Flush per provider. MD noted concern for catastrophic neurologic event. MD to address further as pt stabilizes. Noted pt tested positive for COVID-19. Prognosis poor, per MD.    Malnutrition Assessment:  · Malnutrition Status: Meets the criteria for moderate malnutrition  · Context: Acute illness or injury  · Findings of the 6 clinical characteristics of malnutrition (Minimum of 2 out of 6 clinical characteristics is required to make the diagnosis of moderate or severe Protein Calorie Malnutrition based on AND/ASPEN Guidelines):  1. Energy Intake-Less than or equal to 75% of estimated energy requirement, Greater than or equal to 7 days    2. Weight Loss-10% loss or greater, (4 months or less than 6 months)  3. Fat Loss-Unable to assess,    4. Muscle Loss-Unable to assess,    5. Fluid Accumulation-Unable to assess,    6.   Strength-Not measured    Nutrition Risk Level: High    Nutrition Needs:  · Estimated Daily Total Kcal: 9009-6968 kcal (22-25 kcal/kg ABW)  · Estimated Daily Protein (g):  gm (1-1.2 gm/kg ABW)  · Estimated Daily Fluid (ml/day): per MD     Nutrition Diagnosis:   · Problem: Inadequate oral intake  · Etiology: related to Impaired respiratory function-inability to consume food     Signs and symptoms:  as evidenced by Intubation    Objective Information:  · Nutrition-Focused Physical Findings: Noted loose BMper rectal tube. Noted generalized pittingedema with +3 edema to upper & lower extremities. · Wound Type: (Pt sustained DTI to nose & mucous membrane of lip while proned. Noted multiple fingers black.)  · Current Nutrition Therapies:  · Oral Diet Orders: NPO   · Oral Diet intake: NPO  · Oral Nutrition Supplement (ONS) Orders: None  · ONS intake: NPO  · Tube Feeding (TF) Orders:   · Feeding Route: Nasogastric  · Formula: Semi-elemental  · Rate (ml/hr):75 (rate adjusted for routine Nursing care (~20 hour run) & Propofol dose)    · Volume (ml/day): 1500  · Duration: Continuous  · Water Flushes: tube maintenance of 30 ml every 4 hours  · Current TF & Flush Orders Provides: 1500 ml TF / 1800 kcals / 113 gms pro / 1217ml free water per day. Current maintenance flush adds 180 ml free water per day.   · Additional Calories: Propofol now off  · Anthropometric Measures:  · Ht: 5' 10\" (177.8 cm)   · Current Body Wt: 224 lb (101.6 kg)  · Usual Body Wt: 217 lb (98.4 kg)  · Weight Change: -10% x 4 months per EMR   · Ideal Body Wt: 160 lb (72.6 kg), % Ideal Body    · BMI Classification: BMI 30.0 - 34.9 Obese Class I    Nutrition Interventions:   Continue current Tube Feeding(monitor tolerance to TF, for recommendation to add 1 bottle of Proteinex per feeding tube per day since CRRT started.)  Continued Inpatient Monitoring    Nutrition Evaluation:   · Evaluation: Progressing toward goals   · Goals: tolerate most appropriate form of nutrition   · Monitoring: TF Intake, TF Tolerance, Skin Integrity, Weight, Pertinent Labs, Mental Status/Confusion, Nausea or Vomiting, Diarrhea, Constipation      Electronically signed by Jb Villalta RD, LD on 4/6/20 at 12:49 PM EDT    Contact Number: [phone]

## 2020-04-06 NOTE — PLAN OF CARE
Nutrition Problem: Inadequate oral intake  Intervention: Food and/or Nutrient Delivery: Continue current Tube Feeding(monitor tolerance to TF, for recommendation to add 1 bottle of Proteinex per feeding tube per day since CRRT started.)  Nutritional Goals: tolerate most appropriate form of nutrition

## 2020-04-06 NOTE — PROGRESS NOTES
provider] rosuvastatin  10 mg Oral Daily    sertraline  25 mg Oral Daily    sodium chloride flush  10 mL Intravenous 2 times per day       Continuous Infusions:   heparin 5000 units in 0.9% sodium chloride 1000 mL 25 mL/hr (04/06/20 0102)    propofol Stopped (04/05/20 0543)    EPINEPHrine infusion Stopped (04/06/20 0352)    prismaSATE BGK 4/2.5 2,000 mL/hr (04/06/20 0625)    prismaSATE BGK 4/2.5 1,000 mL/hr (04/06/20 0306)    prismaSATE BGK 4/2.5 1,000 mL/hr (04/05/20 2141)    norepinephrine 35 mcg/min (04/06/20 0603)    vasopressin (Septic Shock) infusion 0.04 Units/min (04/06/20 0547)    fentaNYL Stopped (04/05/20 1818)    dextrose         PRN Meds:  dextrose, potassium chloride, magnesium sulfate, sodium phosphate IVPB **OR** sodium phosphate IVPB **OR** sodium phosphate IVPB **OR** sodium phosphate IVPB, calcium gluconate-NaCl **OR** calcium gluconate IVPB **OR** calcium gluconate IVPB **OR** calcium gluconate IVPB, albuterol sulfate HFA, fentanNYL, glucose, dextrose, glucagon (rDNA), dextrose, sodium chloride flush, acetaminophen **OR** acetaminophen, polyethylene glycol, promethazine **OR** ondansetron    Labs:  CBC:   Recent Labs     04/04/20  0600 04/05/20  0555 04/06/20  0625   WBC 12.1* 9.4 13.1*   HGB 7.8* 6.1* 6.6*   HCT 24.8* 19.0* 20.3*   MCV 86.7 85.2 86.5    125*  --      BMP:   Recent Labs     04/04/20  1705 04/05/20  0555 04/05/20  1800   * 132* 129*   K 4.0 4.4  4.4 4.3   CL 92* 97* 95*   CO2 21 23 23   PHOS 5.6* 4.1 3.4   BUN 34* 25* 18   CREATININE 4.0* 2.8* 2.5*     LIVER PROFILE:   Recent Labs     04/05/20  0555   *   ALT 66*   BILIDIR 0.7*   BILITOT 0.7   ALKPHOS 126     PT/INR: No results for input(s): PROTIME, INR in the last 72 hours. APTT: No results for input(s): APTT in the last 72 hours.   UA:No results for input(s): NITRITE, COLORU, PHUR, LABCAST, WBCUA, RBCUA, MUCUS, TRICHOMONAS, YEAST, BACTERIA, CLARITYU, SPECGRAV, LEUKOCYTESUR, UROBILINOGEN,

## 2020-04-07 NOTE — ADT AUTH CERT
Utilization Reviews         COVID19 by Angela Puentes RN         Review Status Review Entered   In Primary 3/30/2020 11:31       Criteria Review   The illness suspected to be related to the Coronavirus (COVID-19)? Yes  Has the member been tested for the COVID-19? Yes  If Yes, what are the results of the COVID-19?         POSITIVE  What is the severity of the members condition: Droplet Isolation

## 2020-04-07 NOTE — DISCHARGE SUMMARY
progressively became more hypotensive requiring several pressures and also more hypoxic requiring more PEEP. She progressed to refractory shock. Her family modified goals of care. Patient became asystolic and was pronounced dead. Family was notified. Consults:  IP CONSULT TO SPIRITUAL SERVICES  IP CONSULT TO CRITICAL CARE  IP CONSULT TO INFECTIOUS DISEASES  IP CONSULT TO PHARMACY  IP CONSULT TO NEPHROLOGY    Signed:  Nata Mena MD   4/7/2020    Thank you Tara Cheng MD for the opportunity to be involved in this patient's care. If you have any questions or concerns please feel free to contact me at [phone].

## 2020-04-07 NOTE — PLAN OF CARE
Call placed to Linh Aguila NP. Made aware of the pts. Vitals, suspecting she will pass shortly. HR dropped from 70s to 50s. SBP maintaining low 60s-70s. Pressors maxed out infusing. Made aware family has been called.

## 2020-04-07 NOTE — PLAN OF CARE
Called daughter Allie Read to update her that pt. is on 4 pressors and her bp is not improving. Advised to be ready to come in.

## 2020-04-15 ENCOUNTER — TELEPHONE (OUTPATIENT)
Dept: PULMONOLOGY | Age: 70
End: 2020-04-15

## 2020-04-15 NOTE — TELEPHONE ENCOUNTER
Mandy Goldberg was called and informed that Dr. Roverto Pino only saw her 1 day and the hospitalist would need to sign

## 2023-04-28 NOTE — PATIENT INSTRUCTIONS
a full fist.   B. Bend wrist forward and backward (grasp hand around knuckles with other hand to do so). C. Rotate forearm so that your palm faces towards your face. Rotate forearm so that your palm faces away from your face (grasp hand around wrist with other hand to do so). D. Fully straighten elbow. Fully bend elbow. 3. Continue light use of the hand progressing to more normal us as it feels comfortable to do so. 4. In 2 - 4 weeks you may discontinue using the brace (if you were using one) and resume normal use of the hand and wrist if you have regained full and painless motion and function. 5. If you are unable to achieve  full and painless motion and function over 4 weeks, please call the office at [phone] to schedule a follow-up appointment with Dr. Brandon Hou. Thank you for choosing [facility] for your Hand and Upper Extremity needs. If we can be of any further assistance to you, please do not hesitate to contact us.     Office Phone Number:  [phone]  or  [phone]
no

## 2024-02-29 NOTE — TELEPHONE ENCOUNTER
Left patient a detailed message that 1) call Dr. Nathanael Cuevas to review. 2) BLE arterial duplex to make sure no circulation issues.  will schedule when she calls back. (1) weak, irregular

## 2025-02-17 NOTE — PROGRESS NOTES
Rusty is here with some pressure in her L ear 5 days ago. It is better now.  She tried a Q-tip.  Still feels plugged up .      Meds noted    PHYSICAL EXAM:  Blood pressure 134/80, pulse 68, weight 57.4 kg (126 lb 8 oz), last menstrual period 01/01/2005.  General:  Alert, in no acute distress.  Uses a walker  Skin:  Warm with normal turgor.  Head:  Atraumatic and normocephalic, no sinus tenderness.  Eyes:  Eyelids and conjunctivae appear normal, no excessive tearing or discharge,  EOMI.  Ears:  R - TM is clear and normal appearing.  L -cerumen impaction  Nose:  No flaring or discharge present.  Throat:  Oropharynx is clear, no redness or exudate present.   Neck:  Supple   Lungs:  Clear to auscultation, no wheezing or rhonchi noted, nl resp effort.  Heart:  Regular rhythm S1 and S2.  Abdomen:  Soft, no guarding , positive for bowel sounds.  Extremities:   no edema, no clubbing, no cyanosis.  Neurologic:  Alert and oriented x3.        ASSESSMENT: Impacted cerumen of left ear  (primary encounter diagnosis)  Irrigated in the office    All questions answered, patient advised to call office with any future concerns.       - Bactroban    Nutrition  - DIET TUBE FEED CONTINUOUS/CYCLIC NPO; Semi-elemental; Orogastric; Continuous; 25; 75    -     Intake/Output Summary (Last 24 hours) at 3/31/2020 0826  Last data filed at 3/31/2020 0516  Gross per 24 hour   Intake 3218.11 ml   Output 825 ml   Net 2393.11 ml          Access  Arterial   Arterial Line 03/29/20 Right Femoral (Active)   $ Arterial line insertion $ Yes 3/29/2020  2:03 PM   Continued need for line? Yes 3/30/2020  6:00 AM   Site Assessment Clean;Dry; Intact 3/30/2020  6:00 AM   Line Status Arterial fluids per protocol; Intact and in place 3/30/2020  6:00 AM   Art Line Waveform Appropriate 3/30/2020  6:00 AM   Art Line Interventions Zeroed and calibrated; Leveled; Flushed per protocol 3/29/2020  8:00 PM   Color/Movement/Sensation Capillary refill less than 3 sec 3/30/2020  6:00 AM   Dressing Status Clean;Dry; Intact 3/30/2020  6:00 AM   Dressing Type Transparent; Anti-microbial patch 3/30/2020  6:00 AM   Dressing Intervention New 3/29/2020  2:03 PM   Number of days: 0       PICC          CVC       CVC Triple Lumen 03/29/20 Right Femoral (Active)   $ Central line insertion $ Yes 3/29/2020  2:03 PM   Continued need for line? Yes 3/30/2020  6:00 AM   Site Assessment Clean;Dry; Intact 3/30/2020  6:00 AM   Proximal Lumen Status Infusing 3/30/2020  6:00 AM   Medial Lumen Status Normal saline locked 3/30/2020  6:00 AM   Distal Lumen Status Normal saline locked 3/30/2020  6:00 AM   Dressing Type Transparent; Anti-microbial patch 3/30/2020  6:00 AM   Dressing Status Clean;Dry; Intact 3/30/2020  6:00 AM   Dressing Intervention New 3/29/2020  2:03 PM   Number of days: 0                    I spent 40 minutes of critical care time with this patient excluding any procedures. This note was transcribed using [id number] [employer]. Please disregard any translational errors.       Pranav [facility], [facility] [phone]

## (undated) DEVICE — BANDAGE COMPR W4INXL12FT E DISP ESMARCH EVEN

## (undated) DEVICE — GOWN SIRUS NONREIN XL W/TWL: Brand: MEDLINE INDUSTRIES, INC.

## (undated) DEVICE — INTENDED FOR TISSUE SEPARATION, AND OTHER PROCEDURES THAT REQUIRE A SHARP SURGICAL BLADE TO PUNCTURE OR CUT.: Brand: BARD-PARKER ® STAINLESS STEEL BLADES

## (undated) DEVICE — PADDING UNDERCAST W4INXL4YD 100% COT CRIMPED FINISH WBRL II

## (undated) DEVICE — SOLUTION IV IRRIG 250ML ST LF 0.9% SODIUM 2F7122

## (undated) DEVICE — MINOR SET UP PK

## (undated) DEVICE — SUTURE VCRL SZ 3-0 L27IN ABSRB UD L26MM SH 1/2 CIR J416H

## (undated) DEVICE — SHEET,DRAPE,53X77,STERILE: Brand: MEDLINE

## (undated) DEVICE — DRAPE HND W114XL142IN BLU POLYPR W O PCH FEN CRD AND TB HLDR

## (undated) DEVICE — Z DISCONTINUED USE 2275676 GLOVE SURG SZ 65 L12IN FNGR THK87MIL DK GRN LTX FREE ISOLEX

## (undated) DEVICE — SPONGE GZ W4XL4IN COT 12 PLY TYP VII WVN C FLD DSGN

## (undated) DEVICE — UNDERGLOVE SURG SZ 8 FNGR THK0.21MIL GRN LTX BEAD CUF

## (undated) DEVICE — GLOVE SURG SZ 8 L12IN FNGR THK94MIL STD WHT LTX SYN POLYMER

## (undated) DEVICE — COVER LT HNDL BLU PLAS

## (undated) DEVICE — BANDAGE COMPR W4INXL15FT BGE E SGL LAYERED CLP CLSR

## (undated) DEVICE — GLOVE SURG SZ 65 L12IN FNGR THK87MIL WHT LTX FREE

## (undated) DEVICE — CHLORAPREP 26ML ORANGE

## (undated) DEVICE — ZIMMER® STERILE DISPOSABLE TOURNIQUET CUFF WITH PLC, DUAL PORT, SINGLE BLADDER, 18 IN. (46 CM)

## (undated) DEVICE — DRESSING PETRO W3XL3IN OIL EMUL N ADH GZ KNIT IMPREG CELOS

## (undated) DEVICE — GOWN,SIRUS,POLYRNF,BRTHSLV,XL,30/CS: Brand: MEDLINE